# Patient Record
Sex: FEMALE | Race: WHITE | NOT HISPANIC OR LATINO | Employment: UNEMPLOYED | URBAN - METROPOLITAN AREA
[De-identification: names, ages, dates, MRNs, and addresses within clinical notes are randomized per-mention and may not be internally consistent; named-entity substitution may affect disease eponyms.]

---

## 2017-01-10 ENCOUNTER — OFFICE VISIT (OUTPATIENT)
Dept: PHYSICAL MEDICINE AND REHAB | Facility: CLINIC | Age: 57
End: 2017-01-10

## 2017-01-10 VITALS — HEIGHT: 65 IN | HEART RATE: 81 BPM | DIASTOLIC BLOOD PRESSURE: 64 MMHG | SYSTOLIC BLOOD PRESSURE: 105 MMHG

## 2017-01-10 DIAGNOSIS — G24.1 DYSTONIA, TORSION, FRAGMENTS OF: ICD-10-CM

## 2017-01-10 DIAGNOSIS — G24.3 SPASMODIC TORTICOLLIS: Primary | ICD-10-CM

## 2017-01-10 ASSESSMENT — PAIN SCALES - GENERAL: PAINLEVEL: NO PAIN (0)

## 2017-01-10 NOTE — PROGRESS NOTES
"BOTULINUM TOXIN PROCEDURE NOTE    Chief Complaint   Patient presents with     RECHECK     Cervical Dystonia        /64 mmHg  Pulse 81  Ht 1.651 m (5' 5\")      Current outpatient prescriptions:      epinastine HCl (ELESTAT) 0.05 % SOLN, 1 drop, Disp: , Rfl:      azelastine (OPTIVAR) 0.05 % SOLN, 1 drop, Disp: , Rfl:      OnabotulinumtoxinA (BOTOX IJ), Inject 200 Units into the muscle Lot # /C3  Exp: 5/2019, Disp: , Rfl:      OnabotulinumtoxinA (BOTOX IJ), Inject 200 Units as directed Lot # /C3 with Expiration Date:  2/2019 NDC:  [address]-01  (100 unit vial of Botox), Disp: , Rfl:      OnabotulinumtoxinA (BOTOX IJ), Inject 200 Units into the muscle Lot# /C3, Exp 09/2018, Disp: , Rfl:      OnabotulinumtoxinA (BOTOX IJ), Inject 175 Units into the muscle Lot# /C3, Exp 08/2018, Disp: , Rfl:      EPINEPHrine (EPIPEN JR) 0.15 MG/0.3ML injection, Inject 0.15 mg into the muscle as needed for anaphylaxis, Disp: , Rfl:      OnabotulinumtoxinA (BOTOX IJ), Inject 175 Units into the muscle Lot# /C3, Exp 10/2017, Disp: , Rfl:      OnabotulinumtoxinA (BOTOX IJ), Inject 175 Units as directed Lot # /C3  Exp: 8/2017, Disp: , Rfl:      OnabotulinumtoxinA (BOTOX IJ), Inject 175 Units into the muscle Lot# /C3, Exp 04/2017, Disp: , Rfl:      OnabotulinumtoxinA (BOTOX IJ), Inject 175 Units into the muscle Lot # /C3  Exp: 1/2017, Disp: , Rfl:      olopatadine (PATANOL) 0.1 % ophthalmic solution, 1 drop, Disp: , Rfl:      penciclovir (DENAVIR) 1 % cream, Apply 1 g topically every 2 hours, Disp: , Rfl:      acetaminophen (TYLENOL) 500 MG tablet, Take 500-1,000 mg by mouth every 6 hours as needed for mild pain (can take 4 per day), Disp: , Rfl:      estrogen, conjugated,-medroxyPROGESTERone (PREMPRO) 0.3-1.5 MG per tablet, Take 1 tablet by mouth daily, Disp: , Rfl:      Allergies   Allergen Reactions     Morphine Sulfate Hives     Latex      CONTACT RASH WITH LATEX PRODUCTS IN THE HEAT OF SUMMER "     Morphine Hives     Oxycodone Itching        PHYSICAL EXAM:  HEAD, NECK AND TRUNK PATTERN:   Tremor: Observed- no-no tremor   Forward Head: Present  Head & Neck Rotation: Right  Head & Neck Lateral Bend: Left  Shoulder Elevation: right (postural)    HPI:    Patient denies new medical diagnoses, illnesses, hospitalizations, emergency room visits, and injuries since the previous injection with botulinum neurotoxin.    We reviewed the recommended safety guidelines for  Botox from any vaccine injection, such as the seasonal flu vaccine, by a minimum of 10-14 days with Kendra Vaca. She acknowledged understanding.    RESPONSE TO PREVIOUS TREATMENT:    Kendra Vaca received 200 units of Botox on 2016.    Problems following the previous series of neurotoxin injections included:  Difficulty swallowing:  Rated as 'Mild' severity.  Duration: 1-2 weeks then full resolution.    BENEFITS BY PATIENT REPORT:    Pain Improvement: Yes. Percent Improvement: 90%, Duration of Benefit: 10 weeks and followed by a gradual reduction in benefit.    Dystonia Improvement: Yes. Percent Improvement: 90%, Duration of Benefit: 10 weeks and followed by a gradual reduction in benefit. Increase in dystonic movements and tremor over the last 3-4 weeks.     BOTULINUM NEUROTOXIN INJECTION PROCEDURES:    VERIFICATION OF PATIENT IDENTIFICATION AND PROCEDURE     Initials   Patient Name SCP   Patient  SCP   Procedure Verified by: [facility]     Prior to the start of the procedure and with procedural staff participation, I verbally confirmed the patient s identity using two indicators, relevant allergies, that the procedure was appropriate and matched the consent or emergent situation, and that the correct equipment/implants were available. Immediately prior to starting the procedure I conducted the Time Out with the procedural staff and re-confirmed the patient s name, procedure, and site/side. (The Joint Commission universal  protocol was followed.)  Yes    Sedation (Moderate or Deep): None    Above assessments performed by:  Resident/Fellow         Jeannine Reddy MD          The attending provider was present for the entire procedure documented below.    Eduard Llamas MD      INDICATION/S FOR PROCEDURE/S:  Kendra Vaca is a 56 year old patient with dystonia affecting the head, neck and shoulder girdle musculature secondary to a diagnosis of cervical and segmental dystonia with associated pain, tremor, loss of joint motion, loss of volitional motor control and difficulty with activities of daily living.     Her baseline symptoms have been recalcitrant to oral medications and conservative therapy. She is here today for re-evaluation and it was decided to proceed with re-injection of Botox.       TOTAL DOSE ADMINISTERED:  Dose Administered:  200 units Botox    Diluent Used:  Preservative Free Normal Saline  Total Volume of Diluent Used:  2.0 ml  Lot #  /C3 with Expiration Date:  08/2019  NDC #: Botox 100u ([address]-11)    Medication guide was offered to patient and was declined.    CONSENT:  The risks, benefits, and treatment options were discussed with Kendra Vaca and she agreed to proceed.      Written consent was obtained by [facility]     EQUIPMENT USED:  Needle-37mm stimulating/recording  EMG/NCS Machine    SKIN PREPARATION:  Skin preparation was performed using an alcohol wipe.    GUIDANCE DESCRIPTION:  Electro-myographic guidance was necessary throughout the procedure to accurately identify all areas of dystonic muscles while avoiding injection of non-dystonic muscles, neighboring nerves and nearby vascular structures.     AREA/MUSCLE INJECTED:  200 units  1. HEAD & NECK MUSCLES: Total dose = 200 units Botox, 1:1 dilution   Right Mid-Trapezius - 20 units of Botox at 1 site/s.   Left Mid-Trapezius - 15 units of Botox at 1 site/s.    Right lateral Trapezius - 25 units of Botox at 2 site.  Left lateral Trapezius - 10  units of Botox at 1 site.    Right Splenius Cervicis - 25 units of Botox at 1 site/s.   Left Splenius Cervicis - 20 units of Botox at 1 site/s.    Right Levator Scapulae - 25 units of Botox at 2 site/s (shoulder muscles).   Left Levator Scapulae - 15 units of Botox at 2 site/s (shoulder muscles).    Right Longissimus Capitis - 10 units of Botox at 1 site/s.     Right Inferior Obliquus Capitis - 10 units of Botox at 1 site/s.                Right Sternal head of the Sternocleidomastoid - 20 units of Botox at 1 site/s.       RESPONSE TO PROCEDURE:  Kendra Vaca tolerated the procedure well and there were no immediate complications.  She was allowed to recover for an appropriate period of time and was discharged home in stable condition.    FOLLOW UP:  Kendra Vaca was asked to follow up by phone in 7-14 days with Laura Lebron PT, Care Coordinator or Anita Howell RN, Care Coordinator, to report her response to this series of injections.  Based on the patient's previous response to this therapy, Kendra Vaca was rescheduled for the next series of injections in 10-11 weeks.    PLAN (Medication Changes, Therapy Orders, Work or Disability Issues, etc.): Will monitor response to today's injections and report.

## 2017-01-10 NOTE — Clinical Note
"1/10/2017       RE: Kendra Vaca  [address] [place] [address]     Dear Colleague,    Thank you for referring your patient, Kendra Vaca, to the [facility] PHYSICAL MEDICINE AND REHABILITATION at [facility]. Please see a copy of my visit note below.    BOTULINUM TOXIN PROCEDURE NOTE    Chief Complaint   Patient presents with     RECHECK     Cervical Dystonia        /64 mmHg  Pulse 81  Ht 1.651 m (5' 5\")      Current outpatient prescriptions:      epinastine HCl (ELESTAT) 0.05 % SOLN, 1 drop, Disp: , Rfl:      azelastine (OPTIVAR) 0.05 % SOLN, 1 drop, Disp: , Rfl:      OnabotulinumtoxinA (BOTOX IJ), Inject 200 Units into the muscle Lot # /C3  Exp: 5/2019, Disp: , Rfl:      OnabotulinumtoxinA (BOTOX IJ), Inject 200 Units as directed Lot # /C3 with Expiration Date:  2/2019 NDC:  [address]-06  (100 unit vial of Botox), Disp: , Rfl:      OnabotulinumtoxinA (BOTOX IJ), Inject 200 Units into the muscle Lot# /C3, Exp 09/2018, Disp: , Rfl:      OnabotulinumtoxinA (BOTOX IJ), Inject 175 Units into the muscle Lot# /C3, Exp 08/2018, Disp: , Rfl:      EPINEPHrine (EPIPEN JR) 0.15 MG/0.3ML injection, Inject 0.15 mg into the muscle as needed for anaphylaxis, Disp: , Rfl:      OnabotulinumtoxinA (BOTOX IJ), Inject 175 Units into the muscle Lot# /C3, Exp 10/2017, Disp: , Rfl:      OnabotulinumtoxinA (BOTOX IJ), Inject 175 Units as directed Lot # /C3  Exp: 8/2017, Disp: , Rfl:      OnabotulinumtoxinA (BOTOX IJ), Inject 175 Units into the muscle Lot# /C3, Exp 04/2017, Disp: , Rfl:      OnabotulinumtoxinA (BOTOX IJ), Inject 175 Units into the muscle Lot # /C3  Exp: 1/2017, Disp: , Rfl:      olopatadine (PATANOL) 0.1 % ophthalmic solution, 1 drop, Disp: , Rfl:      penciclovir (DENAVIR) 1 % cream, Apply 1 g topically every 2 hours, Disp: , Rfl:      acetaminophen (TYLENOL) 500 MG tablet, Take 500-1,000 mg by mouth every 6 hours " as needed for mild pain (can take 4 per day), Disp: , Rfl:      estrogen, conjugated,-medroxyPROGESTERone (PREMPRO) 0.3-1.5 MG per tablet, Take 1 tablet by mouth daily, Disp: , Rfl:      Allergies   Allergen Reactions     Morphine Sulfate Hives     Latex      CONTACT RASH WITH LATEX PRODUCTS IN THE HEAT OF SUMMER     Morphine Hives     Oxycodone Itching        PHYSICAL EXAM:  HEAD, NECK AND TRUNK PATTERN:   Tremor: Observed- no-no tremor   Forward Head: Present  Head & Neck Rotation: Right  Head & Neck Lateral Bend: Left  Shoulder Elevation: right (postural)    HPI:    Patient denies new medical diagnoses, illnesses, hospitalizations, emergency room visits, and injuries since the previous injection with botulinum neurotoxin.    We reviewed the recommended safety guidelines for  Botox from any vaccine injection, such as the seasonal flu vaccine, by a minimum of 10-14 days with Kendra Vaca. She acknowledged understanding.    RESPONSE TO PREVIOUS TREATMENT:    Kendra Vaca received 200 units of Botox on 2016.    Problems following the previous series of neurotoxin injections included:  Difficulty swallowing:  Rated as 'Mild' severity.  Duration: 1-2 weeks then full resolution.    BENEFITS BY PATIENT REPORT:    Pain Improvement: Yes. Percent Improvement: 90%, Duration of Benefit: 10 weeks and followed by a gradual reduction in benefit.    Dystonia Improvement: Yes. Percent Improvement: 90%, Duration of Benefit: 10 weeks and followed by a gradual reduction in benefit. Increase in dystonic movements and tremor over the last 3-4 weeks.     BOTULINUM NEUROTOXIN INJECTION PROCEDURES:    VERIFICATION OF PATIENT IDENTIFICATION AND PROCEDURE     Initials   Patient Name SCP   Patient  SCP   Procedure Verified by: [facility]     Prior to the start of the procedure and with procedural staff participation, I verbally confirmed the patient s identity using two indicators, relevant allergies, that the  procedure was appropriate and matched the consent or emergent situation, and that the correct equipment/implants were available. Immediately prior to starting the procedure I conducted the Time Out with the procedural staff and re-confirmed the patient s name, procedure, and site/side. (The Joint Commission universal protocol was followed.)  Yes    Sedation (Moderate or Deep): None    Above assessments performed by:  Resident/Fellow         Jeannine Reddy MD          The attending provider was present for the entire procedure documented below.    Eduard Llamas MD      INDICATION/S FOR PROCEDURE/S:  Kendra Vaca is a 56 year old patient with dystonia affecting the head, neck and shoulder girdle musculature secondary to a diagnosis of cervical and segmental dystonia with associated pain, tremor, loss of joint motion, loss of volitional motor control and difficulty with activities of daily living.     Her baseline symptoms have been recalcitrant to oral medications and conservative therapy. She is here today for re-evaluation and it was decided to proceed with re-injection of Botox.       TOTAL DOSE ADMINISTERED:  Dose Administered:  200 units Botox    Diluent Used:  Preservative Free Normal Saline  Total Volume of Diluent Used:  2.0 ml  Lot #  /C3 with Expiration Date:  08/2019  NDC #: Botox 100u ([address]-52)    Medication guide was offered to patient and was declined.    CONSENT:  The risks, benefits, and treatment options were discussed with Kendra Vaca and she agreed to proceed.      Written consent was obtained by [facility]     EQUIPMENT USED:  Needle-37mm stimulating/recording  EMG/NCS Machine    SKIN PREPARATION:  Skin preparation was performed using an alcohol wipe.    GUIDANCE DESCRIPTION:  Electro-myographic guidance was necessary throughout the procedure to accurately identify all areas of dystonic muscles while avoiding injection of non-dystonic muscles, neighboring nerves and nearby  vascular structures.     AREA/MUSCLE INJECTED:  200 units  1. HEAD & NECK MUSCLES: Total dose = 200 units Botox, 1:1 dilution   Right Mid-Trapezius - 20 units of Botox at 1 site/s.   Left Mid-Trapezius - 15 units of Botox at 1 site/s.    Right lateral Trapezius - 25 units of Botox at 2 site.  Left lateral Trapezius - 10 units of Botox at 1 site.    Right Splenius Cervicis - 25 units of Botox at 1 site/s.   Left Splenius Cervicis - 20 units of Botox at 1 site/s.    Right Levator Scapulae - 25 units of Botox at 2 site/s (shoulder muscles).   Left Levator Scapulae - 15 units of Botox at 2 site/s (shoulder muscles).    Right Longissimus Capitis - 10 units of Botox at 1 site/s.     Right Inferior Obliquus Capitis - 10 units of Botox at 1 site/s.                Right Sternal head of the Sternocleidomastoid - 20 units of Botox at 1 site/s.       RESPONSE TO PROCEDURE:  Kendra Vaca tolerated the procedure well and there were no immediate complications.  She was allowed to recover for an appropriate period of time and was discharged home in stable condition.    FOLLOW UP:  Kendra Vaca was asked to follow up by phone in 7-14 days with Laura Lebron PT, Care Coordinator or Anita Howell RN, Care Coordinator, to report her response to this series of injections.  Based on the patient's previous response to this therapy, Kendra Vaca was rescheduled for the next series of injections in 10-11 weeks.    PLAN (Medication Changes, Therapy Orders, Work or Disability Issues, etc.): Will monitor response to today's injections and report.      Again, thank you for allowing me to participate in the care of your patient.      Sincerely,    Eduard Llamas MD

## 2017-03-22 ENCOUNTER — OFFICE VISIT (OUTPATIENT)
Dept: PHYSICAL MEDICINE AND REHAB | Facility: CLINIC | Age: 57
End: 2017-03-22

## 2017-03-22 VITALS
HEART RATE: 88 BPM | BODY MASS INDEX: 26.61 KG/M2 | SYSTOLIC BLOOD PRESSURE: 135 MMHG | WEIGHT: 159.7 LBS | HEIGHT: 65 IN | DIASTOLIC BLOOD PRESSURE: 59 MMHG

## 2017-03-22 DIAGNOSIS — G24.3 SPASMODIC TORTICOLLIS: Primary | ICD-10-CM

## 2017-03-22 DIAGNOSIS — G24.1 DYSTONIA, TORSION, FRAGMENTS OF: ICD-10-CM

## 2017-03-22 ASSESSMENT — PAIN SCALES - GENERAL: PAINLEVEL: NO PAIN (0)

## 2017-03-22 NOTE — LETTER
"3/22/2017       RE: Kendra Vaca  [address] [place] [address]     Dear Colleague,    Thank you for referring your patient, Kendra Vaca, to the [facility] PHYSICAL MEDICINE AND REHABILITATION at [facility]. Please see a copy of my visit note below.    BOTULINUM TOXIN PROCEDURE NOTE    Chief Complaint   Patient presents with     RECHECK     [facility] RETURN BOTOX       /59 (BP Location: Left arm, Patient Position: Chair, Cuff Size: Adult Regular)  Pulse 88  Ht 1.651 m (5' 5\")  Wt 72.4 kg (159 lb 11.2 oz)  BMI 26.58 kg/m2      Current Outpatient Prescriptions:      OnabotulinumtoxinA (BOTOX IJ), Inject 200 Units into the muscle once Lot #:  Exp: 08/2019, Disp: , Rfl:      epinastine HCl (ELESTAT) 0.05 % SOLN, 1 drop, Disp: , Rfl:      azelastine (OPTIVAR) 0.05 % SOLN, 1 drop, Disp: , Rfl:      OnabotulinumtoxinA (BOTOX IJ), Inject 200 Units into the muscle Lot # /C3  Exp: 5/2019, Disp: , Rfl:      OnabotulinumtoxinA (BOTOX IJ), Inject 200 Units as directed Lot # /C3 with Expiration Date:  2/2019 NDC:  [address]-91  (100 unit vial of Botox), Disp: , Rfl:      OnabotulinumtoxinA (BOTOX IJ), Inject 200 Units into the muscle Lot# /C3, Exp 09/2018, Disp: , Rfl:      OnabotulinumtoxinA (BOTOX IJ), Inject 175 Units into the muscle Lot# /C3, Exp 08/2018, Disp: , Rfl:      EPINEPHrine (EPIPEN JR) 0.15 MG/0.3ML injection, Inject 0.15 mg into the muscle as needed for anaphylaxis, Disp: , Rfl:      OnabotulinumtoxinA (BOTOX IJ), Inject 175 Units into the muscle Lot# /C3, Exp 10/2017, Disp: , Rfl:      OnabotulinumtoxinA (BOTOX IJ), Inject 175 Units as directed Lot # /C3  Exp: 8/2017, Disp: , Rfl:      OnabotulinumtoxinA (BOTOX IJ), Inject 175 Units into the muscle Lot# /C3, Exp 04/2017, Disp: , Rfl:      OnabotulinumtoxinA (BOTOX IJ), Inject 175 Units into the muscle Lot # /C3  Exp: 1/2017, Disp: , Rfl:      olopatadine " (PATANOL) 0.1 % ophthalmic solution, 1 drop, Disp: , Rfl:      penciclovir (DENAVIR) 1 % cream, Apply 1 g topically every 2 hours, Disp: , Rfl:      acetaminophen (TYLENOL) 500 MG tablet, Take 500-1,000 mg by mouth every 6 hours as needed for mild pain (can take 4 per day), Disp: , Rfl:      estrogen, conjugated,-medroxyPROGESTERone (PREMPRO) 0.3-1.5 MG per tablet, Take 1 tablet by mouth daily, Disp: , Rfl:      Allergies   Allergen Reactions     Morphine Sulfate Hives     Latex      CONTACT RASH WITH LATEX PRODUCTS IN THE HEAT OF SUMMER     Morphine Hives     Oxycodone Itching        PHYSICAL EXAM:  HEAD, NECK AND TRUNK PATTERN:   Tremor: Observed- no-no tremor   Forward Head: Present  Head & Neck Rotation: Right  Head & Neck Lateral Bend: Left  Shoulder Elevation: right (postural)    HPI:    Patient denies new medical diagnoses, illnesses, hospitalizations, emergency room visits, and injuries since the previous injection with botulinum neurotoxin.    We reviewed the recommended safety guidelines for  Botox from any vaccine injection, such as the seasonal flu vaccine, by a minimum of 10-14 days with Kendra Vaca. She acknowledged understanding.    RESPONSE TO PREVIOUS TREATMENT:    Kendra Vaca received 200 units of Botox on 01/10/2017.    Problems following the previous series of neurotoxin injections included:  No problems reported    BENEFITS BY PATIENT REPORT:    Pain Improvement: Yes.  Percent Improvement: 90 %    Duration of Benefit:  10 weeks and followed by a gradual reduction in benefit.     Tremors and dystonia Improvement: Yes.  Percent Improvement: 90 %    Duration of Benefit:  10 weeks and followed by a gradual reduction in benefit.      BOTULINUM NEUROTOXIN INJECTION PROCEDURES:    VERIFICATION OF PATIENT IDENTIFICATION AND PROCEDURE     Initials   Patient Name RRF   Patient  RRF   Procedure Verified by: RRF     Prior to the start of the procedure and with procedural staff  participation, I verbally confirmed the patient s identity using two indicators, relevant allergies, that the procedure was appropriate and matched the consent or emergent situation, and that the correct equipment/implants were available. Immediately prior to starting the procedure I conducted the Time Out with the procedural staff and re-confirmed the patient s name, procedure, and site/side. (The Joint Commission universal protocol was followed.)  Yes    Sedation (Moderate or Deep): None      Above assessments performed by:  Resident/Fellow         Patricia Lindsay MD          The attending provider was present for the entire procedure documented below.    Eduard Llamas MD    INDICATION/S FOR PROCEDURE/S:  Kendra Vaca is a 56 year old patient with dystonia affecting the head, neck and shoulder girdle musculature secondary to a diagnosis of cervical and segmental dystonia with associated pain, tremor, loss of joint motion, loss of volitional motor control and difficulty with activities of daily living.      Her baseline symptoms have been recalcitrant to oral medications and conservative therapy. She is here today for re-evaluation and it was decided to proceed with re-injection of Botox.        TOTAL DOSE ADMINISTERED:  Dose Administered: 200 units Botox   Diluent Used: Preservative Free Normal Saline  Total Volume of Diluent Used: 2.0 ml  Lot # /C3 with Expiration Date: 09/2019  NDC #: Botox 100u ([address]-42)     CONSENT:  The risks, benefits, and treatment options were discussed with Kendra Vaca and she agreed to proceed.     Written consent was obtained by [facility]      EQUIPMENT USED:  Needle-37mm stimulating/recording  EMG/NCS Machine     SKIN PREPARATION:  Skin preparation was performed using an alcohol wipe.     GUIDANCE DESCRIPTION:  Electro-myographic guidance was necessary throughout the procedure to accurately identify all areas of dystonic muscles while avoiding injection of  non-dystonic muscles, neighboring nerves and nearby vascular structures.      AREA/MUSCLE INJECTED: 200 units  1. HEAD & NECK MUSCLES: Total dose = 200 units Botox, 1:1 dilution   Right Mid-Trapezius - 20 units of Botox at 1 site/s.   Left Mid-Trapezius - 15 units of Botox at 1 site/s.     Right lateral Trapezius - 25 units of Botox at 2 site.  Left lateral Trapezius - 10 units of Botox at 1 site.     Right Splenius Cervicis - 25 units of Botox at 1 site/s.   Left Splenius Cervicis - 20 units of Botox at 1 site/s.     Right Levator Scapulae - 25 units of Botox at 2 site/s (shoulder muscles).   Left Levator Scapulae - 15 units of Botox at 2 site/s (shoulder muscles).     Right Longissimus Capitis - 10 units of Botox at 1 site/s.      Right Inferior Obliquus Capitis - 10 units of Botox at 1 site/s.                 Right Sternal head of the Sternocleidomastoid - 20 units of Botox at 1 site/s.         RESPONSE TO PROCEDURE: Kendra Vaca tolerated the procedure well and there were no immediate complications. She was allowed to recover for an appropriate period of time and was discharged home in stable condition.     FOLLOW UP:  Kendra Vaca was asked to follow up by phone in 7-14 days with Laura Lebron PT, Care Coordinator or Anita Howell RN, Care Coordinator, to report her response to this series of injections. Based on the patient's previous response to this therapy, Kendra Vaca was rescheduled for the next series of injections in 10-11 weeks.     PLAN (Medication Changes, Therapy Orders, Work or Disability Issues, etc.): Will monitor response to today's injections and report.       Again, thank you for allowing me to participate in the care of your patient.      Sincerely,    Eduard Llamas MD

## 2017-03-22 NOTE — MR AVS SNAPSHOT
After Visit Summary   3/22/2017    Kendra Vaca    MRN: [id number]           Patient Information     Date Of Birth          1960        Visit Information        Provider Department      3/22/2017 10:40 AM Eduard Llamas MD [facility]         Follow-ups after your visit        Follow-up notes from your care team     Return in about 11 weeks (around 6/7/2017) for Cervical Dystonia.      Your next 10 appointments already scheduled     Mar 22, 2017 10:40 AM CDT   (Arrive by 10:25 AM)   Return Botox with Eduard Llamas MD   [facility] ([facility])    [address] [place] [id number] [phone]            Jun 07, 2017  1:40 PM CDT   (Arrive by 1:25 PM)   Return Botox with Eduard Llamas MD   [facility] ([facility])    [address] [place] [address] [phone]              Who to contact     Please call your clinic at [phone] to:    Ask questions about your health    Make or cancel appointments    Discuss your medicines    Learn about your test results    Speak to your doctor   If you have compliments or concerns about an experience at your clinic, or if you wish to file a complaint, please contact [facility] Patient Relations at [phone] or email us at [email or web address]         Additional Information About Your Visit        MyChart Information     [employer] is an electronic gateway that provides easy, online access to your medical records. With [employer], you can request a clinic appointment, read your test results, renew a prescription or communicate with your care team.     To sign up for [employer] visit the website at [email or web address]   You will be asked to enter the access code listed below, as well as  "some personal information. Please follow the directions to create your username and password.     Your access code is: [email or web address]  Expires: 3/27/2017  7:30 AM     Your access code will  in 90 days. If you need help or a new code, please contact your [facility] or call [phone] for assistance.        Care EveryWhere ID     This is your Care EveryWhere ID. This could be used by other organizations to access your [place] medical records  [id number]        Your Vitals Were     Pulse Height BMI (Body Mass Index)             88 1.651 m (5' 5\") 26.58 kg/m2          Blood Pressure from Last 3 Encounters:   17 135/59   01/10/17 105/64   16 134/62    Weight from Last 3 Encounters:   17 72.4 kg (159 lb 11.2 oz)   16 70.8 kg (156 lb)   16 68 kg (150 lb)              Today, you had the following     No orders found for display       Primary Care Provider Office Phone # Fax #    Dipak Leandro Dale [phone]       [facility] [address]  [facility] [address]        Thank you!     Thank you for choosing [facility]  for your care. Our goal is always to provide you with excellent care. Hearing back from our patients is one way we can continue to improve our services. Please take a few minutes to complete the written survey that you may receive in the mail after your visit with us. Thank you!             Your Updated Medication List - Protect others around you: Learn how to safely use, store and throw away your medicines at www.disposemymeds.org.          This list is accurate as of: 3/22/17 10:29 AM.  Always use your most recent med list.                   Brand Name Dispense Instructions for use    acetaminophen 500 MG tablet    TYLENOL     Take 500-1,000 mg by mouth every 6 hours as needed for mild pain (can take 4 per day)       azelastine 0.05 % Soln ophthalmic solution    OPTIVAR     1 " drop       * BOTOX IJ      Inject 175 Units into the muscle Lot # /C3  Exp: 1/2017       * BOTOX IJ      Inject 175 Units into the muscle Lot# /C3, Exp 04/2017       * BOTOX IJ      Inject 175 Units as directed Lot # /C3  Exp: 8/2017       * BOTOX IJ      Inject 175 Units into the muscle Lot# /C3, Exp 10/2017       * BOTOX IJ      Inject 175 Units into the muscle Lot# /C3, Exp 08/2018       * BOTOX IJ      Inject 200 Units into the muscle Lot# /C3, Exp 09/2018       * BOTOX IJ      Inject 200 Units as directed Lot # /C3 with Expiration Date:? 2/2019 NDC:  [address]-13  (100 unit vial of Botox)       * BOTOX IJ      Inject 200 Units into the muscle Lot # /C3  Exp: 5/2019       * BOTOX IJ      Inject 200 Units into the muscle once Lot #:  Exp: 08/2019       DENAVIR 1 % cream   Generic drug:  penciclovir      Apply 1 g topically every 2 hours       ELESTAT 0.05 % Soln   Generic drug:  epinastine HCl      1 drop       EPINEPHrine 0.15 MG/0.3ML injection    EPIPEN JR     Inject 0.15 mg into the muscle as needed for anaphylaxis       estrogen (conjugated)-medroxyPROGESTERone 0.3-1.5 MG per tablet    PREMPRO     Take 1 tablet by mouth daily       PATANOL 0.1 % ophthalmic solution   Generic drug:  olopatadine      1 drop       * Notice:  This list has 9 medication(s) that are the same as other medications prescribed for you. Read the directions carefully, and ask your doctor or other care provider to review them with you.

## 2017-03-22 NOTE — PROGRESS NOTES
"BOTULINUM TOXIN PROCEDURE NOTE    Chief Complaint   Patient presents with     RECHECK     [facility] RETURN BOTOX       /59 (BP Location: Left arm, Patient Position: Chair, Cuff Size: Adult Regular)  Pulse 88  Ht 1.651 m (5' 5\")  Wt 72.4 kg (159 lb 11.2 oz)  BMI 26.58 kg/m2      Current Outpatient Prescriptions:      OnabotulinumtoxinA (BOTOX IJ), Inject 200 Units into the muscle once Lot #:  Exp: 08/2019, Disp: , Rfl:      epinastine HCl (ELESTAT) 0.05 % SOLN, 1 drop, Disp: , Rfl:      azelastine (OPTIVAR) 0.05 % SOLN, 1 drop, Disp: , Rfl:      OnabotulinumtoxinA (BOTOX IJ), Inject 200 Units into the muscle Lot # /C3  Exp: 5/2019, Disp: , Rfl:      OnabotulinumtoxinA (BOTOX IJ), Inject 200 Units as directed Lot # /C3 with Expiration Date:  2/2019 NDC:  [address]-66  (100 unit vial of Botox), Disp: , Rfl:      OnabotulinumtoxinA (BOTOX IJ), Inject 200 Units into the muscle Lot# /C3, Exp 09/2018, Disp: , Rfl:      OnabotulinumtoxinA (BOTOX IJ), Inject 175 Units into the muscle Lot# /C3, Exp 08/2018, Disp: , Rfl:      EPINEPHrine (EPIPEN JR) 0.15 MG/0.3ML injection, Inject 0.15 mg into the muscle as needed for anaphylaxis, Disp: , Rfl:      OnabotulinumtoxinA (BOTOX IJ), Inject 175 Units into the muscle Lot# /C3, Exp 10/2017, Disp: , Rfl:      OnabotulinumtoxinA (BOTOX IJ), Inject 175 Units as directed Lot # /C3  Exp: 8/2017, Disp: , Rfl:      OnabotulinumtoxinA (BOTOX IJ), Inject 175 Units into the muscle Lot# /C3, Exp 04/2017, Disp: , Rfl:      OnabotulinumtoxinA (BOTOX IJ), Inject 175 Units into the muscle Lot # /C3  Exp: 1/2017, Disp: , Rfl:      olopatadine (PATANOL) 0.1 % ophthalmic solution, 1 drop, Disp: , Rfl:      penciclovir (DENAVIR) 1 % cream, Apply 1 g topically every 2 hours, Disp: , Rfl:      acetaminophen (TYLENOL) 500 MG tablet, Take 500-1,000 mg by mouth every 6 hours as needed for mild pain (can take 4 per day), Disp: , Rfl:      estrogen, " conjugated,-medroxyPROGESTERone (PREMPRO) 0.3-1.5 MG per tablet, Take 1 tablet by mouth daily, Disp: , Rfl:      Allergies   Allergen Reactions     Morphine Sulfate Hives     Latex      CONTACT RASH WITH LATEX PRODUCTS IN THE HEAT OF SUMMER     Morphine Hives     Oxycodone Itching        PHYSICAL EXAM:  HEAD, NECK AND TRUNK PATTERN:   Tremor: Observed- no-no tremor   Forward Head: Present  Head & Neck Rotation: Right  Head & Neck Lateral Bend: Left  Shoulder Elevation: right (postural)    HPI:    Patient denies new medical diagnoses, illnesses, hospitalizations, emergency room visits, and injuries since the previous injection with botulinum neurotoxin.    We reviewed the recommended safety guidelines for  Botox from any vaccine injection, such as the seasonal flu vaccine, by a minimum of 10-14 days with Kendra Vaca. She acknowledged understanding.    RESPONSE TO PREVIOUS TREATMENT:    Kendra Vaca received 200 units of Botox on 01/10/2017.    Problems following the previous series of neurotoxin injections included:  No problems reported    BENEFITS BY PATIENT REPORT:    Pain Improvement: Yes.  Percent Improvement: 90 %    Duration of Benefit:  10 weeks and followed by a gradual reduction in benefit.     Tremors and dystonia Improvement: Yes.  Percent Improvement: 90 %    Duration of Benefit:  10 weeks and followed by a gradual reduction in benefit.      BOTULINUM NEUROTOXIN INJECTION PROCEDURES:    VERIFICATION OF PATIENT IDENTIFICATION AND PROCEDURE     Initials   Patient Name RRF   Patient  RRF   Procedure Verified by: ALICIA     Prior to the start of the procedure and with procedural staff participation, I verbally confirmed the patient s identity using two indicators, relevant allergies, that the procedure was appropriate and matched the consent or emergent situation, and that the correct equipment/implants were available. Immediately prior to starting the procedure I conducted the Time  Out with the procedural staff and re-confirmed the patient s name, procedure, and site/side. (The Joint Commission universal protocol was followed.)  Yes    Sedation (Moderate or Deep): None      Above assessments performed by:  Resident/Fellow         Patricia Lindsay MD          The attending provider was present for the entire procedure documented below.    Eduard Llamas MD    INDICATION/S FOR PROCEDURE/S:  Kendra Vaca is a 56 year old patient with dystonia affecting the head, neck and shoulder girdle musculature secondary to a diagnosis of cervical and segmental dystonia with associated pain, tremor, loss of joint motion, loss of volitional motor control and difficulty with activities of daily living.      Her baseline symptoms have been recalcitrant to oral medications and conservative therapy. She is here today for re-evaluation and it was decided to proceed with re-injection of Botox.        TOTAL DOSE ADMINISTERED:  Dose Administered: 200 units Botox   Diluent Used: Preservative Free Normal Saline  Total Volume of Diluent Used: 2.0 ml  Lot # /C3 with Expiration Date: 09/2019  NDC #: Botox 100u ([address]-54)     CONSENT:  The risks, benefits, and treatment options were discussed with Kendra Vaca and she agreed to proceed.     Written consent was obtained by F      EQUIPMENT USED:  Needle-37mm stimulating/recording  EMG/NCS Machine     SKIN PREPARATION:  Skin preparation was performed using an alcohol wipe.     GUIDANCE DESCRIPTION:  Electro-myographic guidance was necessary throughout the procedure to accurately identify all areas of dystonic muscles while avoiding injection of non-dystonic muscles, neighboring nerves and nearby vascular structures.      AREA/MUSCLE INJECTED: 200 units  1. HEAD & NECK MUSCLES: Total dose = 200 units Botox, 1:1 dilution   Right Mid-Trapezius - 20 units of Botox at 1 site/s.   Left Mid-Trapezius - 15 units of Botox at 1 site/s.     Right lateral Trapezius  - 25 units of Botox at 2 site.  Left lateral Trapezius - 10 units of Botox at 1 site.     Right Splenius Cervicis - 25 units of Botox at 1 site/s.   Left Splenius Cervicis - 20 units of Botox at 1 site/s.     Right Levator Scapulae - 25 units of Botox at 2 site/s (shoulder muscles).   Left Levator Scapulae - 15 units of Botox at 2 site/s (shoulder muscles).     Right Longissimus Capitis - 10 units of Botox at 1 site/s.      Right Inferior Obliquus Capitis - 10 units of Botox at 1 site/s.                 Right Sternal head of the Sternocleidomastoid - 20 units of Botox at 1 site/s.         RESPONSE TO PROCEDURE: Kendra Vaca tolerated the procedure well and there were no immediate complications. She was allowed to recover for an appropriate period of time and was discharged home in stable condition.     FOLLOW UP:  Kendra Vaca was asked to follow up by phone in 7-14 days with Laura Lebron PT, Care Coordinator or Anita Howell RN, Care Coordinator, to report her response to this series of injections. Based on the patient's previous response to this therapy, Kendra Vaca was rescheduled for the next series of injections in 10-11 weeks.     PLAN (Medication Changes, Therapy Orders, Work or Disability Issues, etc.): Will monitor response to today's injections and report.

## 2017-06-07 ENCOUNTER — OFFICE VISIT (OUTPATIENT)
Dept: PHYSICAL MEDICINE AND REHAB | Facility: CLINIC | Age: 57
End: 2017-06-07

## 2017-06-07 VITALS
HEART RATE: 92 BPM | SYSTOLIC BLOOD PRESSURE: 163 MMHG | WEIGHT: 155.1 LBS | DIASTOLIC BLOOD PRESSURE: 86 MMHG | HEIGHT: 65 IN | BODY MASS INDEX: 25.84 KG/M2 | TEMPERATURE: 97.8 F

## 2017-06-07 DIAGNOSIS — G24.3 SPASMODIC TORTICOLLIS: Primary | ICD-10-CM

## 2017-06-07 DIAGNOSIS — G24.1 DYSTONIA, TORSION, FRAGMENTS OF: ICD-10-CM

## 2017-06-07 ASSESSMENT — PAIN SCALES - GENERAL: PAINLEVEL: NO PAIN (0)

## 2017-06-07 NOTE — LETTER
"6/7/2017       RE: Kendra Vaca  [address] [place] [address]     Dear Colleague,    Thank you for referring your patient, Kendra Vaca, to the [facility] at [facility]. Please see a copy of my visit note below.    BOTULINUM TOXIN PROCEDURE NOTE  Chief Complaint   Patient presents with     RECHECK     [facility] RETURN - BOTOX       Ht 1.651 m (5' 5\")  Wt 70.4 kg (155 lb 1.6 oz)  BMI 25.81 kg/m2      Current Outpatient Prescriptions:      OnabotulinumtoxinA (BOTOX IJ), Inject 200 Units as directed once Lot # /C3 with Expiration Date: 09/2019, Disp: , Rfl:      OnabotulinumtoxinA (BOTOX IJ), Inject 200 Units into the muscle once Lot #:  Exp: 08/2019, Disp: , Rfl:      epinastine HCl (ELESTAT) 0.05 % SOLN, 1 drop, Disp: , Rfl:      azelastine (OPTIVAR) 0.05 % SOLN, 1 drop, Disp: , Rfl:      OnabotulinumtoxinA (BOTOX IJ), Inject 200 Units into the muscle Lot # /C3  Exp: 5/2019, Disp: , Rfl:      OnabotulinumtoxinA (BOTOX IJ), Inject 200 Units as directed Lot # /C3 with Expiration Date:  2/2019 NDC:  [address]-65  (100 unit vial of Botox), Disp: , Rfl:      OnabotulinumtoxinA (BOTOX IJ), Inject 200 Units into the muscle Lot# /C3, Exp 09/2018, Disp: , Rfl:      OnabotulinumtoxinA (BOTOX IJ), Inject 175 Units into the muscle Lot# /C3, Exp 08/2018, Disp: , Rfl:      EPINEPHrine (EPIPEN JR) 0.15 MG/0.3ML injection, Inject 0.15 mg into the muscle as needed for anaphylaxis, Disp: , Rfl:      OnabotulinumtoxinA (BOTOX IJ), Inject 175 Units into the muscle Lot# /C3, Exp 10/2017, Disp: , Rfl:      OnabotulinumtoxinA (BOTOX IJ), Inject 175 Units as directed Lot # /C3  Exp: 8/2017, Disp: , Rfl:      OnabotulinumtoxinA (BOTOX IJ), Inject 175 Units into the muscle Lot# /C3, Exp 04/2017, Disp: , Rfl:      OnabotulinumtoxinA (BOTOX IJ), Inject 175 Units into the muscle Lot # /C3  Exp: 1/2017, " Disp: , Rfl:      olopatadine (PATANOL) 0.1 % ophthalmic solution, 1 drop, Disp: , Rfl:      penciclovir (DENAVIR) 1 % cream, Apply 1 g topically every 2 hours, Disp: , Rfl:      acetaminophen (TYLENOL) 500 MG tablet, Take 500-1,000 mg by mouth every 6 hours as needed for mild pain (can take 4 per day), Disp: , Rfl:      Allergies   Allergen Reactions     Morphine Sulfate Hives     Latex      CONTACT RASH WITH LATEX PRODUCTS IN THE HEAT OF SUMMER     Morphine Hives     Oxycodone Itching      PHYSICAL EXAM:  HEAD, NECK AND TRUNK PATTERN:   Tremor: Observed- no-no tremor   Forward Head: Present  Head flexion present  Head & Neck Rotation: Right  Head & Neck Lateral Bend: Left  Shoulder Elevation: right (postural)    HPI:  Patient denies new medical diagnoses, illnesses, hospitalizations, emergency room visits, and injuries since the previous injection with botulinum neurotoxin.    We reviewed the recommended safety guidelines for  Botox from any vaccine injection, such as the seasonal flu vaccine, by a minimum of 10-14 days with Kendra Vaca. She acknowledged understanding.    RESPONSE TO PREVIOUS TREATMENT:  Kendra Vaca received 200 units of Botox on 2017.    Problems following the previous series of neurotoxin injections included:  No problems reported    BENEFITS BY PATIENT REPORT:  Pain Improvement: Yes.  Percent Improvement: 80-90 %    Duration of Benefit:  10 weeks and followed by a gradual reduction in benefit.     Tremors and dystonia Improvement: Yes.  Percent Improvement: 80-90 %    Duration of Benefit:  10 weeks and followed by a gradual reduction in benefit.      BOTULINUM NEUROTOXIN INJECTION PROCEDURES:    VERIFICATION OF PATIENT IDENTIFICATION AND PROCEDURE     Initials   Patient Name RRN   Patient  RRN   Procedure Verified by: TRICIA     Prior to the start of the procedure and with procedural staff participation, I verbally confirmed the patient s identity using two  indicators, relevant allergies, that the procedure was appropriate and matched the consent or emergent situation, and that the correct equipment/implants were available. Immediately prior to starting the procedure I conducted the Time Out with the procedural staff and re-confirmed the patient s name, procedure, and site/side. (The Joint Commission universal protocol was followed.)  Yes    Sedation (Moderate or Deep): None      Above assessments performed by:  Resident/Fellow         William Wilkins, DO          The attending provider was present for the entire procedure documented below.    Eduard Llamas MD    INDICATION/S FOR PROCEDURE/S:  Kendra Vaca is a 56 year old patient with dystonia affecting the head, neck and shoulder girdle musculature secondary to a diagnosis of cervical and segmental dystonia with associated pain, tremor, loss of joint motion, loss of volitional motor control and difficulty with activities of daily living.      Her baseline symptoms have been recalcitrant to oral medications and conservative therapy. She is here today for re-evaluation and it was decided to proceed with re-injection of Botox.        TOTAL DOSE ADMINISTERED:  Dose Administered: 200 units Botox   Diluent Used: Preservative Free Normal Saline  Total Volume of Diluent Used: 2.0 ml  Lot # /C3 with Expiration Date: 01/2020  NDC #: Botox 100u ([address]-90)     CONSENT:  The risks, benefits, and treatment options were discussed with Kendra Vaca and she agreed to proceed.     Written consent was obtained by RRN     EQUIPMENT USED:  Needle-37mm stimulating/recording  EMG/NCS Machine     SKIN PREPARATION:  Skin preparation was performed using an alcohol wipe.     GUIDANCE DESCRIPTION:  Electro-myographic guidance was necessary throughout the procedure to accurately identify all areas of dystonic muscles while avoiding injection of non-dystonic muscles, neighboring nerves and nearby vascular structures.       AREA/MUSCLE INJECTED: 200 units  1. HEAD & NECK MUSCLES: Total dose = 200 units Botox, 1:1 dilution   Right Mid-Trapezius - 20 units of Botox at 1 site/s.   Left Mid-Trapezius - 15 units of Botox at 1 site/s.     Right lateral Trapezius - 25 units of Botox at 2 site.  Left lateral Trapezius - 10 units of Botox at 1 site.     Right Splenius Cervicis - 25 units of Botox at 1 site/s.   Left Splenius Cervicis - 20 units of Botox at 1 site/s.     Right Levator Scapulae - 25 units of Botox at 2 site/s (shoulder muscles).   Left Levator Scapulae - 15 units of Botox at 2 site/s (shoulder muscles).     Right Longissimus Capitis - 10 units of Botox at 1 site/s.      Right Inferior Obliquus Capitis - 10 units of Botox at 1 site/s.                 Right Sternal head of the Sternocleidomastoid - 20 units of Botox at 1 site/s.         RESPONSE TO PROCEDURE: Kendra Vaca tolerated the procedure well and there were no immediate complications. She was allowed to recover for an appropriate period of time and was discharged home in stable condition.     FOLLOW UP:  Kendra Vaca was asked to follow up by phone in 7-14 days with Laura Lebron PT, Care Coordinator or Anita Howell RN, Care Coordinator, to report her response to this series of injections. Based on the patient's previous response to this therapy, Kendra Vaca was rescheduled for the next series of injections in 10-11 weeks.     PLAN (Medication Changes, Therapy Orders, Work or Disability Issues, etc.): Will monitor response to today's injections and report.       Again, thank you for allowing me to participate in the care of your patient.      Sincerely,    Eduard Llamas MD

## 2017-06-07 NOTE — NURSING NOTE
Chief Complaint   Patient presents with     RECHECK     [facility] RETURN - BOTOX     Danika Trujillo MA

## 2017-06-07 NOTE — PROGRESS NOTES
"BOTULINUM TOXIN PROCEDURE NOTE    Chief Complaint   Patient presents with     RECHECK     [facility] RETURN - BOTOX       Ht 1.651 m (5' 5\")  Wt 70.4 kg (155 lb 1.6 oz)  BMI 25.81 kg/m2      Current Outpatient Prescriptions:      OnabotulinumtoxinA (BOTOX IJ), Inject 200 Units as directed once Lot # /C3 with Expiration Date: 09/2019, Disp: , Rfl:      OnabotulinumtoxinA (BOTOX IJ), Inject 200 Units into the muscle once Lot #:  Exp: 08/2019, Disp: , Rfl:      epinastine HCl (ELESTAT) 0.05 % SOLN, 1 drop, Disp: , Rfl:      azelastine (OPTIVAR) 0.05 % SOLN, 1 drop, Disp: , Rfl:      OnabotulinumtoxinA (BOTOX IJ), Inject 200 Units into the muscle Lot # /C3  Exp: 5/2019, Disp: , Rfl:      OnabotulinumtoxinA (BOTOX IJ), Inject 200 Units as directed Lot # /C3 with Expiration Date:  2/2019 NDC:  [address]-27  (100 unit vial of Botox), Disp: , Rfl:      OnabotulinumtoxinA (BOTOX IJ), Inject 200 Units into the muscle Lot# /C3, Exp 09/2018, Disp: , Rfl:      OnabotulinumtoxinA (BOTOX IJ), Inject 175 Units into the muscle Lot# /C3, Exp 08/2018, Disp: , Rfl:      EPINEPHrine (EPIPEN JR) 0.15 MG/0.3ML injection, Inject 0.15 mg into the muscle as needed for anaphylaxis, Disp: , Rfl:      OnabotulinumtoxinA (BOTOX IJ), Inject 175 Units into the muscle Lot# /C3, Exp 10/2017, Disp: , Rfl:      OnabotulinumtoxinA (BOTOX IJ), Inject 175 Units as directed Lot # /C3  Exp: 8/2017, Disp: , Rfl:      OnabotulinumtoxinA (BOTOX IJ), Inject 175 Units into the muscle Lot# /C3, Exp 04/2017, Disp: , Rfl:      OnabotulinumtoxinA (BOTOX IJ), Inject 175 Units into the muscle Lot # /C3  Exp: 1/2017, Disp: , Rfl:      olopatadine (PATANOL) 0.1 % ophthalmic solution, 1 drop, Disp: , Rfl:      penciclovir (DENAVIR) 1 % cream, Apply 1 g topically every 2 hours, Disp: , Rfl:      acetaminophen (TYLENOL) 500 MG tablet, Take 500-1,000 mg by mouth every 6 hours as needed for mild pain (can take 4 per day), Disp: " , Rfl:      Allergies   Allergen Reactions     Morphine Sulfate Hives     Latex      CONTACT RASH WITH LATEX PRODUCTS IN THE HEAT OF SUMMER     Morphine Hives     Oxycodone Itching        PHYSICAL EXAM:  HEAD, NECK AND TRUNK PATTERN:   Tremor: Observed- no-no tremor   Forward Head: Present  Head flexion present  Head & Neck Rotation: Right  Head & Neck Lateral Bend: Left  Shoulder Elevation: right (postural)    HPI:    Patient denies new medical diagnoses, illnesses, hospitalizations, emergency room visits, and injuries since the previous injection with botulinum neurotoxin.    We reviewed the recommended safety guidelines for  Botox from any vaccine injection, such as the seasonal flu vaccine, by a minimum of 10-14 days with Kendra Vaca. She acknowledged understanding.    RESPONSE TO PREVIOUS TREATMENT:    Kendra Vaca received 200 units of Botox on 2017.    Problems following the previous series of neurotoxin injections included:  No problems reported    BENEFITS BY PATIENT REPORT:    Pain Improvement: Yes.  Percent Improvement: 80-90 %    Duration of Benefit:  10 weeks and followed by a gradual reduction in benefit.     Tremors and dystonia Improvement: Yes.  Percent Improvement: 80-90 %    Duration of Benefit:  10 weeks and followed by a gradual reduction in benefit.      BOTULINUM NEUROTOXIN INJECTION PROCEDURES:    VERIFICATION OF PATIENT IDENTIFICATION AND PROCEDURE     Initials   Patient Name RRN   Patient  RRN   Procedure Verified by: TRICIA     Prior to the start of the procedure and with procedural staff participation, I verbally confirmed the patient s identity using two indicators, relevant allergies, that the procedure was appropriate and matched the consent or emergent situation, and that the correct equipment/implants were available. Immediately prior to starting the procedure I conducted the Time Out with the procedural staff and re-confirmed the patient s name,  procedure, and site/side. (The Joint Commission universal protocol was followed.)  Yes    Sedation (Moderate or Deep): None      Above assessments performed by:  Resident/Fellow         William Wilkins DO          The attending provider was present for the entire procedure documented below.    Eduard Llamas MD    INDICATION/S FOR PROCEDURE/S:  Kendra Vaca is a 56 year old patient with dystonia affecting the head, neck and shoulder girdle musculature secondary to a diagnosis of cervical and segmental dystonia with associated pain, tremor, loss of joint motion, loss of volitional motor control and difficulty with activities of daily living.      Her baseline symptoms have been recalcitrant to oral medications and conservative therapy. She is here today for re-evaluation and it was decided to proceed with re-injection of Botox.        TOTAL DOSE ADMINISTERED:  Dose Administered: 200 units Botox   Diluent Used: Preservative Free Normal Saline  Total Volume of Diluent Used: 2.0 ml  Lot # /C3 with Expiration Date: 01/2020  NDC #: Botox 100u ([address]-46)     CONSENT:  The risks, benefits, and treatment options were discussed with Kendra Vaca and she agreed to proceed.     Written consent was obtained by RRN     EQUIPMENT USED:  Needle-37mm stimulating/recording  EMG/NCS Machine     SKIN PREPARATION:  Skin preparation was performed using an alcohol wipe.     GUIDANCE DESCRIPTION:  Electro-myographic guidance was necessary throughout the procedure to accurately identify all areas of dystonic muscles while avoiding injection of non-dystonic muscles, neighboring nerves and nearby vascular structures.      AREA/MUSCLE INJECTED: 200 units  1. HEAD & NECK MUSCLES: Total dose = 200 units Botox, 1:1 dilution   Right Mid-Trapezius - 20 units of Botox at 1 site/s.   Left Mid-Trapezius - 15 units of Botox at 1 site/s.     Right lateral Trapezius - 25 units of Botox at 2 site.  Left lateral Trapezius - 10  units of Botox at 1 site.     Right Splenius Cervicis - 25 units of Botox at 1 site/s.   Left Splenius Cervicis - 20 units of Botox at 1 site/s.     Right Levator Scapulae - 25 units of Botox at 2 site/s (shoulder muscles).   Left Levator Scapulae - 15 units of Botox at 2 site/s (shoulder muscles).     Right Longissimus Capitis - 10 units of Botox at 1 site/s.      Right Inferior Obliquus Capitis - 10 units of Botox at 1 site/s.                 Right Sternal head of the Sternocleidomastoid - 20 units of Botox at 1 site/s.         RESPONSE TO PROCEDURE: Kendra Vaca tolerated the procedure well and there were no immediate complications. She was allowed to recover for an appropriate period of time and was discharged home in stable condition.     FOLLOW UP:  Kendra Vaca was asked to follow up by phone in 7-14 days with Laura Lebron PT, Care Coordinator or Anita Howell RN, Care Coordinator, to report her response to this series of injections. Based on the patient's previous response to this therapy, Kendra Vaca was rescheduled for the next series of injections in 10-11 weeks.     PLAN (Medication Changes, Therapy Orders, Work or Disability Issues, etc.): Will monitor response to today's injections and report.

## 2017-06-07 NOTE — MR AVS SNAPSHOT
After Visit Summary   2017    Kendra Vaca    MRN: [id number]           Patient Information     Date Of Birth          1960        Visit Information        Provider Department      2017 1:40 PM Eduard Llamas MD [facility] Medicine [facility] Rehabilitation         Follow-ups after your visit        Follow-up notes from your care team     Return in about 10 weeks (around 2017) for Dystonia.      Your next 10 appointments already scheduled     Aug 24, 2017  3:50 PM CDT   (Arrive by 3:35 PM)   Return Botox with Eduard Llamas MD   [facility] Medicine [facility] Rehabilitation ([facility])    [address] [place] [id number] [phone]              Who to contact     Please call your clinic at [phone] to:    Ask questions about your health    Make or cancel appointments    Discuss your medicines    Learn about your test results    Speak to your doctor   If you have compliments or concerns about an experience at your clinic, or if you wish to file a complaint, please contact [facility] Patient Relations at [phone] or email us at [email or web address]         Additional Information About Your Visit        MyChart Information     [facility] is an electronic gateway that provides easy, online access to your medical records. With [employer], you can request a clinic appointment, read your test results, renew a prescription or communicate with your care team.     To sign up for [facility] visit the website at [email or web address]   You will be asked to enter the access code listed below, as well as some personal information. Please follow the directions to create your username and password.     Your access code is: [email or web address]  Expires: 2017  6:30 AM     Your access code will  in 90 days. If you need help or a new code, please contact your [facility] or call [phone] for assistance.        Care EveryWhere ID     This is your Care EveryWhere ID. This could be used by other organizations to access your [facility] medical records  [id number]        Your Vitals Were     Pulse Temperature Height BMI (Body Mass Index)          92 97.8  F (36.6  C) (Oral) 1.651 m (5' 5\") 25.81 kg/m2         Blood Pressure from Last 3 Encounters:   06/07/17 163/86   03/22/17 135/59   01/10/17 105/64    Weight from Last 3 Encounters:   06/07/17 70.4 kg (155 lb 1.6 oz)   03/22/17 72.4 kg (159 lb 11.2 oz)   09/14/16 70.8 kg (156 lb)              Today, you had the following     No orders found for display       Primary Care Provider Office Phone # Fax #    [place] [phone]       [facility] [address]  [facility] [address]        Thank you!     Thank you for choosing [facility] PHYSICAL MEDICINE AND REHABILITATION  for your care. Our goal is always to provide you with excellent care. Hearing back from our patients is one way we can continue to improve our services. Please take a few minutes to complete the written survey that you may receive in the mail after your visit with us. Thank you!             Your Updated Medication List - Protect others around you: Learn how to safely use, store and throw away your medicines at www.disposemymeds.org.          This list is accurate as of: 6/7/17  1:44 PM.  Always use your most recent med list.                   Brand Name Dispense Instructions for use    acetaminophen 500 MG tablet    TYLENOL     Take 500-1,000 mg by mouth every 6 hours as needed for mild pain (can take 4 per day)       azelastine 0.05 % Soln ophthalmic solution    OPTIVAR     1 drop       * BOTOX IJ      Inject 200 Units as directed once Lot # /C3 with Expiration Date: 09/2019       * BOTOX IJ      Inject 175 Units into the muscle Lot # /C3  Exp: 1/2017       * BOTOX IJ      Inject 175 Units into the " muscle Lot# /C3, Exp 04/2017       * BOTOX IJ      Inject 175 Units as directed Lot # /C3  Exp: 8/2017       * BOTOX IJ      Inject 175 Units into the muscle Lot# /C3, Exp 10/2017       * BOTOX IJ      Inject 175 Units into the muscle Lot# /C3, Exp 08/2018       * BOTOX IJ      Inject 200 Units into the muscle Lot# /C3, Exp 09/2018       * BOTOX IJ      Inject 200 Units as directed Lot # /C3 with Expiration Date:? 2/2019 NDC:  [address]-94  (100 unit vial of Botox)       * BOTOX IJ      Inject 200 Units into the muscle Lot # /C3  Exp: 5/2019       * BOTOX IJ      Inject 200 Units into the muscle once Lot #:  Exp: 08/2019       * BOTOX IJ      Inject 200 Units as directed once Lot#: [id number] Exp: 01/2020       DENAVIR 1 % cream   Generic drug:  penciclovir      Apply 1 g topically every 2 hours       ELESTAT 0.05 % Soln   Generic drug:  epinastine HCl      1 drop       EPINEPHrine 0.15 MG/0.3ML injection    EPIPEN JR     Inject 0.15 mg into the muscle as needed for anaphylaxis       PATANOL 0.1 % ophthalmic solution   Generic drug:  olopatadine      1 drop       * Notice:  This list has 11 medication(s) that are the same as other medications prescribed for you. Read the directions carefully, and ask your doctor or other care provider to review them with you.

## 2017-08-24 ENCOUNTER — OFFICE VISIT (OUTPATIENT)
Dept: PHYSICAL MEDICINE AND REHAB | Facility: CLINIC | Age: 57
End: 2017-08-24

## 2017-08-24 VITALS — HEART RATE: 82 BPM | SYSTOLIC BLOOD PRESSURE: 110 MMHG | HEIGHT: 65 IN | DIASTOLIC BLOOD PRESSURE: 83 MMHG

## 2017-08-24 DIAGNOSIS — G24.1 DYSTONIA, TORSION, FRAGMENTS OF: ICD-10-CM

## 2017-08-24 DIAGNOSIS — G24.3 SPASMODIC TORTICOLLIS: Primary | ICD-10-CM

## 2017-08-24 ASSESSMENT — PAIN SCALES - GENERAL: PAINLEVEL: NO PAIN (0)

## 2017-08-24 NOTE — LETTER
"8/24/2017       RE: Kendra Vaca  [address] [place] [address]     Dear Colleague,    Thank you for referring your patient, Kendra Vaca, to the [facility] PHYSICAL MEDICINE AND REHABILITATION at [facility]. Please see a copy of my visit note below.    BOTULINUM TOXIN PROCEDURE NOTE  Chief Complaint   Patient presents with     Dystonia     Botox Cervical Dystonia       /83  Pulse 82  Ht 1.651 m (5' 5\")    Current Outpatient Prescriptions:      OnabotulinumtoxinA (BOTOX IJ), Inject 200 Units as directed once Lot#: [id number] Exp: 01/2020, Disp: , Rfl:      OnabotulinumtoxinA (BOTOX IJ), Inject 200 Units as directed once Lot # /C3 with Expiration Date: 09/2019, Disp: , Rfl:      OnabotulinumtoxinA (BOTOX IJ), Inject 200 Units into the muscle once Lot #:  Exp: 08/2019, Disp: , Rfl:      epinastine HCl (ELESTAT) 0.05 % SOLN, 1 drop, Disp: , Rfl:      azelastine (OPTIVAR) 0.05 % SOLN, 1 drop, Disp: , Rfl:      OnabotulinumtoxinA (BOTOX IJ), Inject 200 Units into the muscle Lot # /C3  Exp: 5/2019, Disp: , Rfl:      OnabotulinumtoxinA (BOTOX IJ), Inject 200 Units as directed Lot # /C3 with Expiration Date:  2/2019 NDC:  [address]-14  (100 unit vial of Botox), Disp: , Rfl:      OnabotulinumtoxinA (BOTOX IJ), Inject 200 Units into the muscle Lot# /C3, Exp 09/2018, Disp: , Rfl:      OnabotulinumtoxinA (BOTOX IJ), Inject 175 Units into the muscle Lot# /C3, Exp 08/2018, Disp: , Rfl:      EPINEPHrine (EPIPEN JR) 0.15 MG/0.3ML injection, Inject 0.15 mg into the muscle as needed for anaphylaxis, Disp: , Rfl:      OnabotulinumtoxinA (BOTOX IJ), Inject 175 Units into the muscle Lot# /C3, Exp 10/2017, Disp: , Rfl:      OnabotulinumtoxinA (BOTOX IJ), Inject 175 Units as directed Lot # /C3  Exp: 8/2017, Disp: , Rfl:      OnabotulinumtoxinA (BOTOX IJ), Inject 175 Units into the muscle Lot# /C3, Exp 04/2017, Disp: , Rfl:      " OnabotulinumtoxinA (BOTOX IJ), Inject 175 Units into the muscle Lot # /C3  Exp: 2017, Disp: , Rfl:      olopatadine (PATANOL) 0.1 % ophthalmic solution, 1 drop, Disp: , Rfl:      penciclovir (DENAVIR) 1 % cream, Apply 1 g topically every 2 hours, Disp: , Rfl:      acetaminophen (TYLENOL) 500 MG tablet, Take 500-1,000 mg by mouth every 6 hours as needed for mild pain (can take 4 per day), Disp: , Rfl:      Allergies   Allergen Reactions     Morphine Sulfate Hives     Latex      CONTACT RASH WITH LATEX PRODUCTS IN THE HEAT OF SUMMER     Morphine Hives     Oxycodone Itching      PHYSICAL EXAM:  HEAD, NECK AND TRUNK PATTERN:   Tremor: Observed- no-no tremor   Forward Head: Present  Head flexion present  Head & Neck Rotation: Right  Head & Neck Lateral Bend: Left  Shoulder Elevation: right (postural)    HPI:  Patient denies new medical diagnoses, illnesses, hospitalizations, emergency room visits, and injuries since the previous injection with botulinum neurotoxin.    We reviewed the recommended safety guidelines for  Botox from any vaccine injection, such as the seasonal flu vaccine, by a minimum of 10-14 days with Kendra Vaca. She acknowledged understanding.    RESPONSE TO PREVIOUS TREATMENT:    Kendra Vaca received 200 units of Botox on 2017.    Problems following the previous series of neurotoxin injections included:  No problems reported    BENEFITS BY PATIENT REPORT:  Pain Improvement: Yes.  Percent Improvement: 80-90 %    Duration of Benefit:  10 weeks and followed by a gradual reduction in benefit.      Tremors and dystonia Improvement: Yes.  Percent Improvement: 80-90 %    Duration of Benefit:  10 weeks and followed by a gradual reduction in benefit.    BOTULINUM NEUROTOXIN INJECTION PROCEDURES:    VERIFICATION OF PATIENT IDENTIFICATION AND PROCEDURE   Initials   Patient Name lmd   Patient  lmd   Procedure Verified by: lmd     Prior to the start of the procedure and with  procedural staff participation, I verbally confirmed the patient s identity using two indicators, relevant allergies, that the procedure was appropriate and matched the consent or emergent situation, and that the correct equipment/implants were available. Immediately prior to starting the procedure I conducted the Time Out with the procedural staff and re-confirmed the patient s name, procedure, and site/side. (The Joint Commission universal protocol was followed.)  Yes    Sedation (Moderate or Deep): None    Above assessments performed by:  Anita Davidson RN Care Coordinator    Eduard Llamas MD      INDICATION/S FOR PROCEDURE/S:  Kendra Vaca is a 57 year old patient with dystonia affecting the head, neck and shoulder girdle musculature secondary to a diagnosis of cervical and segmental dystonia with associated pain, tremor, loss of joint motion, loss of volitional motor control and difficulty with activities of daily living.       Her baseline symptoms have been recalcitrant to oral medications and conservative therapy. She is here today for re-evaluation and it was decided to proceed with re-injection of Botox.       TOTAL DOSE ADMINISTERED:  Dose Administered:  200 units Botox    Diluent Used:  Preservative Free Normal Saline  Total Volume of Diluent Used:  2.0 ml  Lot # /C3 with Expiration Date:  4/2020  NDC #: Botox 100u ([address]-85)    Medication guide was offered to patient and was declined.    CONSENT:  The risks, benefits, and treatment options were discussed with Kendra Vaca and she agreed to proceed.      Written consent was obtained by lmd.     EQUIPMENT USED:  Needle-37mm stimulating/recording  EMG/NCS Machine      SKIN PREPARATION:  Skin preparation was performed using an alcohol wipe.      GUIDANCE DESCRIPTION:  Electro-myographic guidance was necessary throughout the procedure to accurately identify all areas of dystonic muscles while avoiding injection of non-dystonic  muscles, neighboring nerves and nearby vascular structures.     AREA/MUSCLE INJECTED:  200 Units Botox  1. HEAD & NECK MUSCLES: Total dose = 200 units Botox, 1:1 dilution   Right Mid-Trapezius - 20 units of Botox at 1 site/s.   Left Mid-Trapezius - 15 units of Botox at 1 site/s.      Right lateral Trapezius - 25 units of Botox at 2 site.  Left lateral Trapezius - 10 units of Botox at 1 site.      Right Splenius Cervicis - 25 units of Botox at 1 site/s.   Left Splenius Cervicis - 20 units of Botox at 1 site/s.      Right Levator Scapulae - 25 units of Botox at 2 site/s (shoulder muscles).   Left Levator Scapulae - 15 units of Botox at 2 site/s (shoulder muscles).      Right Longissimus Capitis - 10 units of Botox at 1 site/s.       Right Inferior Obliquus Capitis - 10 units of Botox at 1 site/s.                  Right Sternal head of the Sternocleidomastoid - 20 units of Botox at 1 site/s.     RESPONSE TO PROCEDURE:  Kendra Vaca tolerated the procedure well and there were no immediate complications.  She was allowed to recover for an appropriate period of time and was discharged home in stable condition.    FOLLOW UP:  Kendra Vaca was asked to follow up by phone in 7-14 days with Laura Lebron PT, Care Coordinator or Anita Howell RN, Care Coordinator, to report her response to this series of injections.  Based on the patient's previous response to this therapy, Kendra Vaca was rescheduled for the next series of injections in 10-11 weeks.    PLAN (Medication Changes, Therapy Orders, Work or Disability Issues, etc.): Will monitor response to today's injections and report.    Again, thank you for allowing me to participate in the care of your patient.      Sincerely,    Eduard Llamas MD

## 2017-08-24 NOTE — PROGRESS NOTES
"BOTULINUM TOXIN PROCEDURE NOTE    Chief Complaint   Patient presents with     Dystonia     Botox Cervical Dystonia       /83  Pulse 82  Ht 1.651 m (5' 5\")      Current Outpatient Prescriptions:      OnabotulinumtoxinA (BOTOX IJ), Inject 200 Units as directed once Lot#: [id number] Exp: 01/2020, Disp: , Rfl:      OnabotulinumtoxinA (BOTOX IJ), Inject 200 Units as directed once Lot # /C3 with Expiration Date: 09/2019, Disp: , Rfl:      OnabotulinumtoxinA (BOTOX IJ), Inject 200 Units into the muscle once Lot #:  Exp: 08/2019, Disp: , Rfl:      epinastine HCl (ELESTAT) 0.05 % SOLN, 1 drop, Disp: , Rfl:      azelastine (OPTIVAR) 0.05 % SOLN, 1 drop, Disp: , Rfl:      OnabotulinumtoxinA (BOTOX IJ), Inject 200 Units into the muscle Lot # /C3  Exp: 5/2019, Disp: , Rfl:      OnabotulinumtoxinA (BOTOX IJ), Inject 200 Units as directed Lot # /C3 with Expiration Date:  2/2019 NDC:  [address]-77  (100 unit vial of Botox), Disp: , Rfl:      OnabotulinumtoxinA (BOTOX IJ), Inject 200 Units into the muscle Lot# /C3, Exp 09/2018, Disp: , Rfl:      OnabotulinumtoxinA (BOTOX IJ), Inject 175 Units into the muscle Lot# /C3, Exp 08/2018, Disp: , Rfl:      EPINEPHrine (EPIPEN JR) 0.15 MG/0.3ML injection, Inject 0.15 mg into the muscle as needed for anaphylaxis, Disp: , Rfl:      OnabotulinumtoxinA (BOTOX IJ), Inject 175 Units into the muscle Lot# /C3, Exp 10/2017, Disp: , Rfl:      OnabotulinumtoxinA (BOTOX IJ), Inject 175 Units as directed Lot # /C3  Exp: 8/2017, Disp: , Rfl:      OnabotulinumtoxinA (BOTOX IJ), Inject 175 Units into the muscle Lot# /C3, Exp 04/2017, Disp: , Rfl:      OnabotulinumtoxinA (BOTOX IJ), Inject 175 Units into the muscle Lot # /C3  Exp: 1/2017, Disp: , Rfl:      olopatadine (PATANOL) 0.1 % ophthalmic solution, 1 drop, Disp: , Rfl:      penciclovir (DENAVIR) 1 % cream, Apply 1 g topically every 2 hours, Disp: , Rfl:      acetaminophen (TYLENOL) 500 MG tablet, " Take 500-1,000 mg by mouth every 6 hours as needed for mild pain (can take 4 per day), Disp: , Rfl:      Allergies   Allergen Reactions     Morphine Sulfate Hives     Latex      CONTACT RASH WITH LATEX PRODUCTS IN THE HEAT OF SUMMER     Morphine Hives     Oxycodone Itching        PHYSICAL EXAM:  HEAD, NECK AND TRUNK PATTERN:   Tremor: Observed- no-no tremor   Forward Head: Present  Head flexion present  Head & Neck Rotation: Right  Head & Neck Lateral Bend: Left  Shoulder Elevation: right (postural)    HPI:    Patient denies new medical diagnoses, illnesses, hospitalizations, emergency room visits, and injuries since the previous injection with botulinum neurotoxin.    We reviewed the recommended safety guidelines for  Botox from any vaccine injection, such as the seasonal flu vaccine, by a minimum of 10-14 days with Kendra Vaca. She acknowledged understanding.    RESPONSE TO PREVIOUS TREATMENT:    Kendra Vaca received 200 units of Botox on 2017.    Problems following the previous series of neurotoxin injections included:  No problems reported    BENEFITS BY PATIENT REPORT:  Pain Improvement: Yes.  Percent Improvement: 80-90 %    Duration of Benefit:  10 weeks and followed by a gradual reduction in benefit.      Tremors and dystonia Improvement: Yes.  Percent Improvement: 80-90 %    Duration of Benefit:  10 weeks and followed by a gradual reduction in benefit.    BOTULINUM NEUROTOXIN INJECTION PROCEDURES:    VERIFICATION OF PATIENT IDENTIFICATION AND PROCEDURE     Initials   Patient Name lmd   Patient  lmd   Procedure Verified by: shanika     Prior to the start of the procedure and with procedural staff participation, I verbally confirmed the patient s identity using two indicators, relevant allergies, that the procedure was appropriate and matched the consent or emergent situation, and that the correct equipment/implants were available. Immediately prior to starting the procedure I  conducted the Time Out with the procedural staff and re-confirmed the patient s name, procedure, and site/side. (The Joint Commission universal protocol was followed.)  Yes    Sedation (Moderate or Deep): None      Above assessments performed by:  Anita Davidson RN Care Coordinator    Eduard Llamas MD      INDICATION/S FOR PROCEDURE/S:  Kendra Vaca is a 57 year old patient with dystonia affecting the head, neck and shoulder girdle musculature secondary to a diagnosis of cervical and segmental dystonia with associated pain, tremor, loss of joint motion, loss of volitional motor control and difficulty with activities of daily living.       Her baseline symptoms have been recalcitrant to oral medications and conservative therapy. She is here today for re-evaluation and it was decided to proceed with re-injection of Botox.       TOTAL DOSE ADMINISTERED:  Dose Administered:  200 units Botox    Diluent Used:  Preservative Free Normal Saline  Total Volume of Diluent Used:  2.0 ml  Lot # /C3 with Expiration Date:  4/2020  NDC #: Botox 100u ([address]-13)    Medication guide was offered to patient and was declined.    CONSENT:  The risks, benefits, and treatment options were discussed with Kendra Vaca and she agreed to proceed.      Written consent was obtained by lmd.     EQUIPMENT USED:  Needle-37mm stimulating/recording  EMG/NCS Machine      SKIN PREPARATION:  Skin preparation was performed using an alcohol wipe.      GUIDANCE DESCRIPTION:  Electro-myographic guidance was necessary throughout the procedure to accurately identify all areas of dystonic muscles while avoiding injection of non-dystonic muscles, neighboring nerves and nearby vascular structures.     AREA/MUSCLE INJECTED:  200 Units Botox  1. HEAD & NECK MUSCLES: Total dose = 200 units Botox, 1:1 dilution   Right Mid-Trapezius - 20 units of Botox at 1 site/s.   Left Mid-Trapezius - 15 units of Botox at 1 site/s.      Right lateral  Trapezius - 25 units of Botox at 2 site.  Left lateral Trapezius - 10 units of Botox at 1 site.      Right Splenius Cervicis - 25 units of Botox at 1 site/s.   Left Splenius Cervicis - 20 units of Botox at 1 site/s.      Right Levator Scapulae - 25 units of Botox at 2 site/s (shoulder muscles).   Left Levator Scapulae - 15 units of Botox at 2 site/s (shoulder muscles).      Right Longissimus Capitis - 10 units of Botox at 1 site/s.       Right Inferior Obliquus Capitis - 10 units of Botox at 1 site/s.                  Right Sternal head of the Sternocleidomastoid - 20 units of Botox at 1 site/s.     RESPONSE TO PROCEDURE:  Kendra Vaca tolerated the procedure well and there were no immediate complications.  She was allowed to recover for an appropriate period of time and was discharged home in stable condition.    FOLLOW UP:  Kendra Vaca was asked to follow up by phone in 7-14 days with Laura Lebron PT, Care Coordinator or Anita Howell RN, Care Coordinator, to report her response to this series of injections.  Based on the patient's previous response to this therapy, Kendra Vaca was rescheduled for the next series of injections in 10-11 weeks.    PLAN (Medication Changes, Therapy Orders, Work or Disability Issues, etc.): Will monitor response to today's injections and report.

## 2017-08-24 NOTE — NURSING NOTE
Chief Complaint   Patient presents with     RECHECK     Cervical Dystonia    Jared Navarro CMA

## 2017-08-24 NOTE — MR AVS SNAPSHOT
After Visit Summary   8/24/2017    Kendra Vaca    MRN: [id number]           Patient Information     Date Of Birth          1960        Visit Information        Provider Department      8/24/2017 3:50 PM Eduard Llamas MD [facility]        Today's Diagnoses     Spasmodic torticollis    -  1    Dystonia, torsion, fragments of           Follow-ups after your visit        Follow-up notes from your care team     Return in about 10 weeks (around 11/2/2017) for Dystonia.      Your next 10 appointments already scheduled     Oct 31, 2017  2:20 PM CDT   (Arrive by 2:05 PM)   Return Botox with Eduard Llamas MD   [facility] ([facility])    [address] [place] [id number] [phone]              Who to contact     Please call your clinic at [phone] to:    Ask questions about your health    Make or cancel appointments    Discuss your medicines    Learn about your test results    Speak to your doctor   If you have compliments or concerns about an experience at your clinic, or if you wish to file a complaint, please contact [facility] Patient Relations at [phone] or email us at [email or web address]         Additional Information About Your Visit        MyChart Information     [employer] is an electronic gateway that provides easy, online access to your medical records. With [employer], you can request a clinic appointment, read your test results, renew a prescription or communicate with your care team.     To sign up for [employer] visit the website at [email or web address]   You will be asked to enter the access code listed below, as well as some personal information. Please follow the directions to create your username and password.     Your access code is: [email or web address]  Expires: 11/22/2017  4:03 PM     Your access code  "will  in 90 days. If you need help or a new code, please contact your [facility] or call [phone] for assistance.        Care EveryWhere ID     This is your Care EveryWhere ID. This could be used by other organizations to access your [place] medical records  [id number]        Your Vitals Were     Pulse Height                82 1.651 m (5' 5\")           Blood Pressure from Last 3 Encounters:   17 110/83   17 163/86   17 135/59    Weight from Last 3 Encounters:   17 70.4 kg (155 lb 1.6 oz)   17 72.4 kg (159 lb 11.2 oz)   16 70.8 kg (156 lb)              We Performed the Following     HC CHEMODENERVATION 1 EXTREMITY, EA ADDL EXTREMITY, 1-4 MUSCLE     HC CHEMODENERVATION MUSCLE NECK UNILAT     HC CHEMODENERVATION ONE EXTREMITY, 1-4 MUSCLE     Needle EMG Guide w/Chemodenervation (31822)        Primary Care Provider Office Phone # Fax #    Dipak Leandro Dale [phone]       [facility] [address]  [facility] [address]        Equal Access to Services     VIVIANA BANEGAS : Hadii aad ku hadasho Soomaali, waaxda luqadaha, qaybta kaalmada adeegyada, waxay jose luis haylisan kaela lynn. So [facility] [phone].    ATENCIÓN: Si habla español, tiene a rodriguez disposición servicios gratuitos de asistencia lingüística. Llame al [phone].    We comply with applicable federal civil rights laws and [place] laws. We do not discriminate on the basis of race, color, national origin, age, disability sex, sexual orientation or gender identity.            Thank you!     Thank you for choosing [facility]  for your care. Our goal is always to provide you with excellent care. Hearing back from our patients is one way we can continue to improve our services. Please take a few minutes to complete the written survey that you may receive in the mail after your visit with us. Thank you!           "   Your Updated Medication List - Protect others around you: Learn how to safely use, store and throw away your medicines at www.disposemymeds.org.          This list is accurate as of: 8/24/17  4:03 PM.  Always use your most recent med list.                   Brand Name Dispense Instructions for use Diagnosis    acetaminophen 500 MG tablet    TYLENOL     Take 500-1,000 mg by mouth every 6 hours as needed for mild pain (can take 4 per day)        azelastine 0.05 % Soln ophthalmic solution    OPTIVAR     1 drop        * BOTOX IJ      Inject 200 Units as directed once Lot # /C3 with Expiration Date: 09/2019        * BOTOX IJ      Inject 175 Units into the muscle Lot # /C3  Exp: 1/2017        * BOTOX IJ      Inject 175 Units into the muscle Lot# /C3, Exp 04/2017        * BOTOX IJ      Inject 175 Units as directed Lot # /C3  Exp: 8/2017        * BOTOX IJ      Inject 175 Units into the muscle Lot# /C3, Exp 10/2017        * BOTOX IJ      Inject 175 Units into the muscle Lot# /C3, Exp 08/2018        * BOTOX IJ      Inject 200 Units into the muscle Lot# /C3, Exp 09/2018        * BOTOX IJ      Inject 200 Units as directed Lot # /C3 with Expiration Date:? 2/2019 NDC:  [address]-96  (100 unit vial of Botox)        * BOTOX IJ      Inject 200 Units into the muscle Lot # /C3  Exp: 5/2019        * BOTOX IJ      Inject 200 Units into the muscle once Lot #:  Exp: 08/2019        * BOTOX IJ      Inject 200 Units as directed once Lot#: [id number] Exp: 01/2020        * BOTOX IJ      Inject 200 Units into the muscle Lot # /C3  Exp: 4/2020        DENAVIR 1 % cream   Generic drug:  penciclovir      Apply 1 g topically every 2 hours        ELESTAT 0.05 % Soln   Generic drug:  epinastine HCl      1 drop        EPINEPHrine 0.15 MG/0.3ML injection 2-pack    EPIPEN JR     Inject 0.15 mg into the muscle as needed for anaphylaxis        PATANOL 0.1 % ophthalmic solution   Generic drug:   olopatadine      1 drop        * Notice:  This list has 12 medication(s) that are the same as other medications prescribed for you. Read the directions carefully, and ask your doctor or other care provider to review them with you.

## 2017-10-31 ENCOUNTER — OFFICE VISIT (OUTPATIENT)
Dept: PHYSICAL MEDICINE AND REHAB | Facility: CLINIC | Age: 57
End: 2017-10-31

## 2017-10-31 VITALS
HEIGHT: 65 IN | TEMPERATURE: 97.6 F | SYSTOLIC BLOOD PRESSURE: 108 MMHG | WEIGHT: 155 LBS | DIASTOLIC BLOOD PRESSURE: 73 MMHG | BODY MASS INDEX: 25.83 KG/M2 | HEART RATE: 97 BPM

## 2017-10-31 DIAGNOSIS — G24.3 SPASMODIC TORTICOLLIS: Primary | ICD-10-CM

## 2017-10-31 DIAGNOSIS — G24.1 DYSTONIA, TORSION, FRAGMENTS OF: ICD-10-CM

## 2017-10-31 ASSESSMENT — PAIN SCALES - GENERAL: PAINLEVEL: NO PAIN (0)

## 2017-10-31 NOTE — LETTER
"10/31/2017       RE: Kendra Vaca  [address] [place] [address]     Dear Colleague,    Thank you for referring your patient, Kendra Vaca, to the [facility]ATION at [facility]. Please see a copy of my visit note below.    BOTULINUM TOXIN PROCEDURE NOTE    Chief Complaint   Patient presents with     RECHECK     [facility] RETURN - BOTOX       /73 (BP Location: Right arm, Patient Position: Sitting, Cuff Size: Adult Regular)  Pulse 97  Temp 97.6  F (36.4  C) (Oral)  Ht 1.651 m (5' 5\")  Wt 70.3 kg (155 lb)  BMI 25.79 kg/m2      Current Outpatient Prescriptions:      OnabotulinumtoxinA (BOTOX IJ), Inject 200 Units into the muscle Lot # /C3  Exp: 4/2020, Disp: , Rfl:      OnabotulinumtoxinA (BOTOX IJ), Inject 200 Units as directed once Lot#: [id number] Exp: 01/2020, Disp: , Rfl:      OnabotulinumtoxinA (BOTOX IJ), Inject 200 Units as directed once Lot # /C3 with Expiration Date: 09/2019, Disp: , Rfl:      OnabotulinumtoxinA (BOTOX IJ), Inject 200 Units into the muscle once Lot #:  Exp: 08/2019, Disp: , Rfl:      epinastine HCl (ELESTAT) 0.05 % SOLN, 1 drop, Disp: , Rfl:      azelastine (OPTIVAR) 0.05 % SOLN, 1 drop, Disp: , Rfl:      OnabotulinumtoxinA (BOTOX IJ), Inject 200 Units into the muscle Lot # /C3  Exp: 5/2019, Disp: , Rfl:      OnabotulinumtoxinA (BOTOX IJ), Inject 200 Units as directed Lot # /C3 with Expiration Date:  2/2019 NDC:  [address]-99  (100 unit vial of Botox), Disp: , Rfl:      OnabotulinumtoxinA (BOTOX IJ), Inject 200 Units into the muscle Lot# /C3, Exp 09/2018, Disp: , Rfl:      OnabotulinumtoxinA (BOTOX IJ), Inject 175 Units into the muscle Lot# /C3, Exp 08/2018, Disp: , Rfl:      EPINEPHrine (EPIPEN JR) 0.15 MG/0.3ML injection, Inject 0.15 mg into the muscle as needed for anaphylaxis, Disp: , Rfl:      OnabotulinumtoxinA (BOTOX IJ), Inject 175 Units into the muscle Lot# " /C3, Exp 10/2017, Disp: , Rfl:      OnabotulinumtoxinA (BOTOX IJ), Inject 175 Units as directed Lot # /C3  Exp: 8/2017, Disp: , Rfl:      OnabotulinumtoxinA (BOTOX IJ), Inject 175 Units into the muscle Lot# /C3, Exp 04/2017, Disp: , Rfl:      OnabotulinumtoxinA (BOTOX IJ), Inject 175 Units into the muscle Lot # /C3  Exp: 1/2017, Disp: , Rfl:      olopatadine (PATANOL) 0.1 % ophthalmic solution, 1 drop, Disp: , Rfl:      penciclovir (DENAVIR) 1 % cream, Apply 1 g topically every 2 hours, Disp: , Rfl:      acetaminophen (TYLENOL) 500 MG tablet, Take 500-1,000 mg by mouth every 6 hours as needed for mild pain (can take 4 per day), Disp: , Rfl:      Allergies   Allergen Reactions     Morphine Sulfate Hives     Latex      CONTACT RASH WITH LATEX PRODUCTS IN THE HEAT OF SUMMER     Morphine Hives        PHYSICAL EXAM:  HEAD, NECK AND TRUNK PATTERN:   Tremor: Observed- no-no tremor   Forward Head: Present  Head flexion present  Head & Neck Rotation: Right  Head & Neck Lateral Bend: Left  Shoulder Elevation: right (postural)    HPI:    Patient denies new medical diagnoses, illnesses, hospitalizations, emergency room visits, and injuries since the previous injection with botulinum neurotoxin.    We reviewed the recommended safety guidelines for  Botox from any vaccine injection, such as the seasonal flu vaccine, by a minimum of 10-14 days with Kendra Vaca. She acknowledged understanding.    RESPONSE TO PREVIOUS TREATMENT:    Kendra Vaca received 200 units of Botox on 08/24/2017.    Problems following the previous series of neurotoxin injections included:  No problems reported    BENEFITS BY PATIENT REPORT:  Pain Improvement: Yes.  Percent Improvement: 80-90 %  Duration of Benefit:  10 weeks and followed by a gradual reduction in benefit.      Tremors and dystonia Improvement: Yes.  Percent Improvement: 80-90 %  Duration of Benefit:  10 weeks and followed by a gradual reduction in  benefit.    BOTULINUM NEUROTOXIN INJECTION PROCEDURES:    VERIFICATION OF PATIENT IDENTIFICATION AND PROCEDURE     Initials   Patient Name ses   Patient  ses   Procedure Verified by: danilo     Prior to the start of the procedure and with procedural staff participation, I verbally confirmed the patient s identity using two indicators, relevant allergies, that the procedure was appropriate and matched the consent or emergent situation, and that the correct equipment/implants were available. Immediately prior to starting the procedure I conducted the Time Out with the procedural staff and re-confirmed the patient s name, procedure, and site/side. (The Joint Commission universal protocol was followed.)  Yes    Sedation (Moderate or Deep): None      Above assessments performed by:  [place] Llamas MD      INDICATION/S FOR PROCEDURE/S:  Kendra Vaca is a 57-year-old patient with dystonia affecting the head, neck and shoulder girdle musculature secondary to a diagnosis of cervical and segmental dystonia with associated pain, tremor, loss of joint motion, loss of volitional motor control and difficulty with activities of daily living.       Her baseline symptoms have been recalcitrant to oral medications and conservative therapy. She is here today for re-evaluation and it was decided to proceed with re-injection of Botox.       TOTAL DOSE ADMINISTERED:  Dose Administered:  200 units Botox    Diluent Used:  Preservative Free Normal Saline  Total Volume of Diluent Used:  2.0 ml  Lot # /C3 with Expiration Date:  2020  NDC #: Botox 100u ([address]-79)    Medication guide was offered to patient and was declined.    CONSENT:  The risks, benefits, and treatment options were discussed with Kendra Vaca and she agreed to proceed.      Written consent was obtained by [facility].     EQUIPMENT USED:  Needle-37mm stimulating/recording  EMG/NCS Machine      SKIN PREPARATION:  Skin preparation  was performed using an alcohol wipe.      GUIDANCE DESCRIPTION:  Electro-myographic guidance was necessary throughout the procedure to accurately identify all areas of dystonic muscles while avoiding injection of non-dystonic muscles, neighboring nerves and nearby vascular structures.     AREA/MUSCLE INJECTED:  200 Units Botox  1. HEAD & NECK MUSCLES: Total dose = 200 units Botox, 1:1 dilution   Right Mid-Trapezius - 20 units of Botox at 1 site/s.   Left Mid-Trapezius - 15 units of Botox at 1 site/s.      Right lateral Trapezius - 25 units of Botox at 2 site.  Left lateral Trapezius - 10 units of Botox at 1 site.      Right Splenius Cervicis - 25 units of Botox at 1 site/s.   Left Splenius Cervicis - 20 units of Botox at 1 site/s.      Right Levator Scapulae - 25 units of Botox at 2 site/s (shoulder muscles).   Left Levator Scapulae - 15 units of Botox at 2 site/s (shoulder muscles).      Right Longissimus Capitis - 10 units of Botox at 1 site/s.       Right Inferior Obliquus Capitis - 10 units of Botox at 1 site/s.                  Right Sternal head of the Sternocleidomastoid - 20 units of Botox at 1 site/s.       RESPONSE TO PROCEDURE:  Kendra Vaca tolerated the procedure well and there were no immediate complications.  She was allowed to recover for an appropriate period of time and was discharged home in stable condition.    FOLLOW UP:  Kendra Vaca was asked to follow up by phone in 7-14 days with Laura Lebron PT, Care Coordinator or Anita Howell RN, Care Coordinator, to report her response to this series of injections.  Based on the patient's previous response to this therapy, Kendra Vaca was rescheduled for the next series of injections in 10-11 weeks.    PLAN (Medication Changes, Therapy Orders, Work or Disability Issues, etc.): Will monitor response to today's injections and report.    Again, thank you for allowing me to participate in the care of your patient.       Sincerely,    Eduard Llamas MD

## 2017-10-31 NOTE — PROGRESS NOTES
"BOTULINUM TOXIN PROCEDURE NOTE    Chief Complaint   Patient presents with     RECHECK     [facility] RETURN - BOTOX       /73 (BP Location: Right arm, Patient Position: Sitting, Cuff Size: Adult Regular)  Pulse 97  Temp 97.6  F (36.4  C) (Oral)  Ht 1.651 m (5' 5\")  Wt 70.3 kg (155 lb)  BMI 25.79 kg/m2      Current Outpatient Prescriptions:      OnabotulinumtoxinA (BOTOX IJ), Inject 200 Units into the muscle Lot # /C3  Exp: 4/2020, Disp: , Rfl:      OnabotulinumtoxinA (BOTOX IJ), Inject 200 Units as directed once Lot#: [id number] Exp: 01/2020, Disp: , Rfl:      OnabotulinumtoxinA (BOTOX IJ), Inject 200 Units as directed once Lot # /C3 with Expiration Date: 09/2019, Disp: , Rfl:      OnabotulinumtoxinA (BOTOX IJ), Inject 200 Units into the muscle once Lot #:  Exp: 08/2019, Disp: , Rfl:      epinastine HCl (ELESTAT) 0.05 % SOLN, 1 drop, Disp: , Rfl:      azelastine (OPTIVAR) 0.05 % SOLN, 1 drop, Disp: , Rfl:      OnabotulinumtoxinA (BOTOX IJ), Inject 200 Units into the muscle Lot # /C3  Exp: 5/2019, Disp: , Rfl:      OnabotulinumtoxinA (BOTOX IJ), Inject 200 Units as directed Lot # /C3 with Expiration Date:  2/2019 NDC:  [address]-57  (100 unit vial of Botox), Disp: , Rfl:      OnabotulinumtoxinA (BOTOX IJ), Inject 200 Units into the muscle Lot# /C3, Exp 09/2018, Disp: , Rfl:      OnabotulinumtoxinA (BOTOX IJ), Inject 175 Units into the muscle Lot# /C3, Exp 08/2018, Disp: , Rfl:      EPINEPHrine (EPIPEN JR) 0.15 MG/0.3ML injection, Inject 0.15 mg into the muscle as needed for anaphylaxis, Disp: , Rfl:      OnabotulinumtoxinA (BOTOX IJ), Inject 175 Units into the muscle Lot# /C3, Exp 10/2017, Disp: , Rfl:      OnabotulinumtoxinA (BOTOX IJ), Inject 175 Units as directed Lot # /C3  Exp: 8/2017, Disp: , Rfl:      OnabotulinumtoxinA (BOTOX IJ), Inject 175 Units into the muscle Lot# /C3, Exp 04/2017, Disp: , Rfl:      OnabotulinumtoxinA (BOTOX IJ), Inject 175 Units into the " muscle Lot # /C3  Exp: 2017, Disp: , Rfl:      olopatadine (PATANOL) 0.1 % ophthalmic solution, 1 drop, Disp: , Rfl:      penciclovir (DENAVIR) 1 % cream, Apply 1 g topically every 2 hours, Disp: , Rfl:      acetaminophen (TYLENOL) 500 MG tablet, Take 500-1,000 mg by mouth every 6 hours as needed for mild pain (can take 4 per day), Disp: , Rfl:      Allergies   Allergen Reactions     Morphine Sulfate Hives     Latex      CONTACT RASH WITH LATEX PRODUCTS IN THE HEAT OF SUMMER     Morphine Hives        PHYSICAL EXAM:  HEAD, NECK AND TRUNK PATTERN:   Tremor: Observed- no-no tremor   Forward Head: Present  Head flexion present  Head & Neck Rotation: Right  Head & Neck Lateral Bend: Left  Shoulder Elevation: right (postural)    HPI:    Patient denies new medical diagnoses, illnesses, hospitalizations, emergency room visits, and injuries since the previous injection with botulinum neurotoxin.    We reviewed the recommended safety guidelines for  Botox from any vaccine injection, such as the seasonal flu vaccine, by a minimum of 10-14 days with Kendra Vaca. She acknowledged understanding.    RESPONSE TO PREVIOUS TREATMENT:    Kendra Vaca received 200 units of Botox on 2017.    Problems following the previous series of neurotoxin injections included:  No problems reported    BENEFITS BY PATIENT REPORT:  Pain Improvement: Yes.  Percent Improvement: 80-90 %  Duration of Benefit:  10 weeks and followed by a gradual reduction in benefit.      Tremors and dystonia Improvement: Yes.  Percent Improvement: 80-90 %  Duration of Benefit:  10 weeks and followed by a gradual reduction in benefit.    BOTULINUM NEUROTOXIN INJECTION PROCEDURES:    VERIFICATION OF PATIENT IDENTIFICATION AND PROCEDURE     Initials   Patient Name ses   Patient  ses   Procedure Verified by: ses     Prior to the start of the procedure and with procedural staff participation, I verbally confirmed the patient s identity  using two indicators, relevant allergies, that the procedure was appropriate and matched the consent or emergent situation, and that the correct equipment/implants were available. Immediately prior to starting the procedure I conducted the Time Out with the procedural staff and re-confirmed the patient s name, procedure, and site/side. (The Joint Commission universal protocol was followed.)  Yes    Sedation (Moderate or Deep): None      Above assessments performed by:  Virginia Llamas MD      INDICATION/S FOR PROCEDURE/S:  Kendra Vaca is a 57-year-old patient with dystonia affecting the head, neck and shoulder girdle musculature secondary to a diagnosis of cervical and segmental dystonia with associated pain, tremor, loss of joint motion, loss of volitional motor control and difficulty with activities of daily living.       Her baseline symptoms have been recalcitrant to oral medications and conservative therapy. She is here today for re-evaluation and it was decided to proceed with re-injection of Botox.       TOTAL DOSE ADMINISTERED:  Dose Administered:  200 units Botox    Diluent Used:  Preservative Free Normal Saline  Total Volume of Diluent Used:  2.0 ml  Lot # /C3 with Expiration Date:  05/2020  NDC #: Botox 100u ([address]-17)    Medication guide was offered to patient and was declined.    CONSENT:  The risks, benefits, and treatment options were discussed with Kendra Vaca and she agreed to proceed.      Written consent was obtained by [facility].     EQUIPMENT USED:  Needle-37mm stimulating/recording  EMG/NCS Machine      SKIN PREPARATION:  Skin preparation was performed using an alcohol wipe.      GUIDANCE DESCRIPTION:  Electro-myographic guidance was necessary throughout the procedure to accurately identify all areas of dystonic muscles while avoiding injection of non-dystonic muscles, neighboring nerves and nearby vascular structures.     AREA/MUSCLE INJECTED:  200  Units Botox  1. HEAD & NECK MUSCLES: Total dose = 200 units Botox, 1:1 dilution   Right Mid-Trapezius - 20 units of Botox at 1 site/s.   Left Mid-Trapezius - 15 units of Botox at 1 site/s.      Right lateral Trapezius - 25 units of Botox at 2 site.  Left lateral Trapezius - 10 units of Botox at 1 site.      Right Splenius Cervicis - 25 units of Botox at 1 site/s.   Left Splenius Cervicis - 20 units of Botox at 1 site/s.      Right Levator Scapulae - 25 units of Botox at 2 site/s (shoulder muscles).   Left Levator Scapulae - 15 units of Botox at 2 site/s (shoulder muscles).      Right Longissimus Capitis - 10 units of Botox at 1 site/s.       Right Inferior Obliquus Capitis - 10 units of Botox at 1 site/s.                  Right Sternal head of the Sternocleidomastoid - 20 units of Botox at 1 site/s.       RESPONSE TO PROCEDURE:  Kendra Vaca tolerated the procedure well and there were no immediate complications.  She was allowed to recover for an appropriate period of time and was discharged home in stable condition.    FOLLOW UP:  Kendra Vaca was asked to follow up by phone in 7-14 days with Laura Lebron PT, Care Coordinator or Anita Howell RN, Care Coordinator, to report her response to this series of injections.  Based on the patient's previous response to this therapy, Kendra Vaca was rescheduled for the next series of injections in 10-11 weeks.    PLAN (Medication Changes, Therapy Orders, Work or Disability Issues, etc.): Will monitor response to today's injections and report.

## 2017-10-31 NOTE — MR AVS SNAPSHOT
After Visit Summary   10/31/2017    Kendra Vaca    MRN: [id number]           Patient Information     Date Of Birth          1960        Visit Information        Provider Department      10/31/2017 2:20 PM Eduard Llamas MD [facility]        Today's Diagnoses     Spasmodic torticollis    -  1    Dystonia, torsion, fragments of           Follow-ups after your visit        Your next 10 appointments already scheduled     2018  8:20 AM CST   (Arrive by 8:05 AM)   Return Botox with Eduard Llamas MD   [facility] ([facility])    [address]  [address] [facility] [address] [phone]              Who to contact     Please call your clinic at [phone] to:    Ask questions about your health    Make or cancel appointments    Discuss your medicines    Learn about your test results    Speak to your doctor   If you have compliments or concerns about an experience at your clinic, or if you wish to file a complaint, please contact [facility] Patient Relations at [phone] or email us at [email or web address]         Additional Information About Your Visit        MyChart Information     [employer] is an electronic gateway that provides easy, online access to your medical records. With [email or web address], you can request a clinic appointment, read your test results, renew a prescription or communicate with your care team.     To sign up for [email or web address] visit the website at [email or web address]   You will be asked to enter the access code listed below, as well as some personal information. Please follow the directions to create your username and password.     Your access code is: [email or web address]  Expires: 2017  4:03 PM     Your access code will  in 90 days. If you need help or a new code, please contact your [facility] or call [phone] for assistance.        Care EveryWhere ID     This is your Care EveryWhere ID. This could be used by other organizations to access your [facility] medical records  [id number]        Your Vitals Were     Pulse Temperature Height BMI (Body Mass Index)          97 97.6  F (36.4  C) (Oral) 1.651 m (5' 5\") 25.79 kg/m2         Blood Pressure from Last 3 Encounters:   10/31/17 108/73   08/24/17 110/83   06/07/17 163/86    Weight from Last 3 Encounters:   10/31/17 70.3 kg (155 lb)   06/07/17 70.4 kg (155 lb 1.6 oz)   03/22/17 72.4 kg (159 lb 11.2 oz)              We Performed the Following     HC CHEMODENERVATION 1 EXTREMITY, EA ADDL EXTREMITY, 1-4 MUSCLE     HC CHEMODENERVATION MUSCLE NECK UNILAT     HC CHEMODENERVATION ONE EXTREMITY, 1-4 MUSCLE     Needle EMG Guide w/Chemodenervation (07308)        Primary Care Provider Office Phone # Fax #    [place] [phone]       [facility] [address]  [facility] [address]        Equal Access to Services     VIVIANA BANEGAS AH: Hadii polo cotoo Sogarret, waaxda luqadaha, qaybta kaalmada adeegyada, shana lynn. So [facility] [phone].    ATENCIÓN: Si habla español, tiene a rodriguez disposición servicios gratuitos de asistencia lingüística. [facility] [phone].    We comply with applicable federal civil rights laws and [place] laws. We do not discriminate on the basis of race, color, national origin, age, disability, sex, sexual orientation, or gender identity.            Thank you!     Thank you for choosing [facility] MEDICINE [facility] REHABILITATION  for your care. Our goal is always to provide you with excellent care. Hearing back from our patients is one way we can continue to improve our services. Please take a few minutes to complete the written survey that you may receive in the mail after your visit with us. Thank you!             Your Updated Medication List - " Protect others around you: Learn how to safely use, store and throw away your medicines at www.disposemymeds.org.          This list is accurate as of: 10/31/17  3:41 PM.  Always use your most recent med list.                   Brand Name Dispense Instructions for use Diagnosis    acetaminophen 500 MG tablet    TYLENOL     Take 500-1,000 mg by mouth every 6 hours as needed for mild pain (can take 4 per day)        azelastine 0.05 % Soln ophthalmic solution    OPTIVAR     1 drop        * BOTOX IJ      Inject 200 Units as directed once Lot # /C3 with Expiration Date: 09/2019        * BOTOX IJ      Inject 175 Units into the muscle Lot # /C3  Exp: 1/2017        * BOTOX IJ      Inject 175 Units into the muscle Lot# /C3, Exp 04/2017        * BOTOX IJ      Inject 175 Units as directed Lot # /C3  Exp: 8/2017        * BOTOX IJ      Inject 175 Units into the muscle Lot# /C3, Exp 10/2017        * BOTOX IJ      Inject 175 Units into the muscle Lot# /C3, Exp 08/2018        * BOTOX IJ      Inject 200 Units into the muscle Lot# /C3, Exp 09/2018        * BOTOX IJ      Inject 200 Units as directed Lot # /C3 with Expiration Date:? 2/2019 NDC:  [address]-66  (100 unit vial of Botox)        * BOTOX IJ      Inject 200 Units into the muscle Lot # /C3  Exp: 5/2019        * BOTOX IJ      Inject 200 Units into the muscle once Lot #:  Exp: 08/2019        * BOTOX IJ      Inject 200 Units as directed once Lot#: [id number] Exp: 01/2020        * BOTOX IJ      Inject 200 Units into the muscle Lot # /C3  Exp: 4/2020        * BOTOX IJ      Inject 200 Units into the muscle once Lot # /C3 Expiration Date:  05/2020        DENAVIR 1 % cream   Generic drug:  penciclovir      Apply 1 g topically every 2 hours        ELESTAT 0.05 % Soln   Generic drug:  epinastine HCl      1 drop        EPINEPHrine 0.15 MG/0.3ML injection 2-pack    EPIPEN JR     Inject 0.15 mg into the muscle as needed for  anaphylaxis        PATANOL 0.1 % ophthalmic solution   Generic drug:  olopatadine      1 drop        * Notice:  This list has 13 medication(s) that are the same as other medications prescribed for you. Read the directions carefully, and ask your doctor or other care provider to review them with you.

## 2018-01-16 ENCOUNTER — OFFICE VISIT (OUTPATIENT)
Dept: PHYSICAL MEDICINE AND REHAB | Facility: CLINIC | Age: 58
End: 2018-01-16
Payer: MEDICARE

## 2018-01-16 VITALS
BODY MASS INDEX: 26.77 KG/M2 | DIASTOLIC BLOOD PRESSURE: 71 MMHG | HEART RATE: 72 BPM | TEMPERATURE: 97.4 F | HEIGHT: 65 IN | WEIGHT: 160.7 LBS | OXYGEN SATURATION: 95 % | SYSTOLIC BLOOD PRESSURE: 105 MMHG

## 2018-01-16 DIAGNOSIS — G24.3 SPASMODIC TORTICOLLIS: Primary | ICD-10-CM

## 2018-01-16 DIAGNOSIS — G24.1 DYSTONIA, TORSION, FRAGMENTS OF: ICD-10-CM

## 2018-01-16 ASSESSMENT — PAIN SCALES - GENERAL: PAINLEVEL: NO PAIN (0)

## 2018-01-16 NOTE — LETTER
"1/16/2018       RE: Kendra Vaca  [address] [place] [address]     Dear Colleague,    Thank you for referring your patient, Kendra Vaca, to the [facility] PH[facility]ICAL MEDICINE AND REHABILITATION at [facility]. Please see a copy of my visit note below.    BOTULINUM TOXIN PROCEDURE NOTE    Chief Complaint   Patient presents with     RECHECK     [facility] RETURN - BOTOX       /71 (BP Location: Right arm, Patient Position: Sitting, Cuff Size: Adult Regular)  Pulse 72  Temp 97.4  F (36.3  C) (Oral)  Ht 1.651 m (5' 5\")  Wt 72.9 kg (160 lb 11.2 oz)  SpO2 95%  BMI 26.74 kg/m2      Current Outpatient Prescriptions:      OnabotulinumtoxinA (BOTOX IJ), Inject 200 Units into the muscle once Lot # /C3 Expiration Date:  05/2020, Disp: , Rfl:      OnabotulinumtoxinA (BOTOX IJ), Inject 200 Units into the muscle Lot # /C3  Exp: 4/2020, Disp: , Rfl:      OnabotulinumtoxinA (BOTOX IJ), Inject 200 Units as directed once Lot#: [id number] Exp: 01/2020, Disp: , Rfl:      OnabotulinumtoxinA (BOTOX IJ), Inject 200 Units as directed once Lot # /C3 with Expiration Date: 09/2019, Disp: , Rfl:      OnabotulinumtoxinA (BOTOX IJ), Inject 200 Units into the muscle once Lot #:  Exp: 08/2019, Disp: , Rfl:      epinastine HCl (ELESTAT) 0.05 % SOLN, 1 drop, Disp: , Rfl:      azelastine (OPTIVAR) 0.05 % SOLN, 1 drop, Disp: , Rfl:      OnabotulinumtoxinA (BOTOX IJ), Inject 200 Units into the muscle Lot # /C3  Exp: 5/2019, Disp: , Rfl:      OnabotulinumtoxinA (BOTOX IJ), Inject 200 Units as directed Lot # /C3 with Expiration Date:  2/2019 NDC:  [address]-92  (100 unit vial of Botox), Disp: , Rfl:      OnabotulinumtoxinA (BOTOX IJ), Inject 200 Units into the muscle Lot# /C3, Exp 09/2018, Disp: , Rfl:      OnabotulinumtoxinA (BOTOX IJ), Inject 175 Units into the muscle Lot# /C3, Exp 08/2018, Disp: , Rfl:      EPINEPHrine (EPIPEN JR) 0.15 MG/0.3ML " injection, Inject 0.15 mg into the muscle as needed for anaphylaxis, Disp: , Rfl:      OnabotulinumtoxinA (BOTOX IJ), Inject 175 Units into the muscle Lot# /C3, Exp 10/2017, Disp: , Rfl:      OnabotulinumtoxinA (BOTOX IJ), Inject 175 Units as directed Lot # /C3  Exp: 8/2017, Disp: , Rfl:      OnabotulinumtoxinA (BOTOX IJ), Inject 175 Units into the muscle Lot# /C3, Exp 04/2017, Disp: , Rfl:      OnabotulinumtoxinA (BOTOX IJ), Inject 175 Units into the muscle Lot # /C3  Exp: 1/2017, Disp: , Rfl:      olopatadine (PATANOL) 0.1 % ophthalmic solution, 1 drop, Disp: , Rfl:      penciclovir (DENAVIR) 1 % cream, Apply 1 g topically every 2 hours, Disp: , Rfl:      acetaminophen (TYLENOL) 500 MG tablet, Take 500-1,000 mg by mouth every 6 hours as needed for mild pain (can take 4 per day), Disp: , Rfl:      Allergies   Allergen Reactions     Morphine Sulfate Hives     Latex      CONTACT RASH WITH LATEX PRODUCTS IN THE HEAT OF SUMMER     Morphine Hives        PHYSICAL EXAM:  HEAD, NECK AND TRUNK PATTERN:   Tremor: Observed- no-no tremor   Forward Head: Present  Head flexion present  Head & Neck Rotation: Right  Head & Neck Lateral Bend: Left  Shoulder Elevation: right (postural)    HPI:    Patient denies new medical diagnoses, illnesses, hospitalizations, emergency room visits, and injuries since the previous injection with botulinum neurotoxin.    We reviewed the recommended safety guidelines for  Botox from any vaccine injection, such as the seasonal flu vaccine, by a minimum of 10-14 days with Kendra Vaca. She acknowledged understanding.    RESPONSE TO PREVIOUS TREATMENT:    Kendra Vaca received 200 units of Botox on 10/31/2017.    Problems following the previous series of neurotoxin injections included:  No problems reported    BENEFITS BY PATIENT REPORT:  Pain Improvement: Yes.  Percent Improvement: 90 %  Duration of Benefit:   4 weeks and followed by a gradual reduction in  benefit.  This is less benefit from a standpoint than she typically gets but worked similar to previous cycles in terms of dystonia/tremor benefit.     Tremors and dystonia Improvement: Yes.  Percent Improvement: 90%  Duration of Benefit:   Up until 5 days ago and followed by a rapid reduction in benefit.    BOTULINUM NEUROTOXIN INJECTION PROCEDURES:    VERIFICATION OF PATIENT IDENTIFICATION AND PROCEDURE     Initials   Patient Name ems   Patient  ems   Procedure Verified by: ems     Prior to the start of the procedure and with procedural staff participation, I verbally confirmed the patient s identity using two indicators, relevant allergies, that the procedure was appropriate and matched the consent or emergent situation, and that the correct equipment/implants were available. Immediately prior to starting the procedure I conducted the Time Out with the procedural staff and re-confirmed the patient s name, procedure, and site/side. (The Joint Commission universal protocol was followed.)  Yes    Sedation (Moderate or Deep): None      Above assessments performed by:  Sudha Llamas MD      INDICATION/S FOR PROCEDURE/S:  Kendra Vaca is a 57-year-old patient with dystonia affecting the head, neck and shoulder girdle musculature secondary to a diagnosis of cervical and segmental dystonia with associated pain, tremor, loss of joint motion, loss of volitional motor control and difficulty with activities of daily living.       Her baseline symptoms have been recalcitrant to oral medications and conservative therapy. She is here today for re-evaluation and it was decided to proceed with re-injection of Botox.       TOTAL DOSE ADMINISTERED:  Dose Administered:  200 units Botox    Diluent Used:  Preservative Free Normal Saline  Total Volume of Diluent Used:  2.0 ml  Lot # /C3 with Expiration Date:  2020  NDC #: Botox 100u ([address]-12)    Medication guide was offered to patient and was  declined.    CONSENT:  The risks, benefits, and treatment options were discussed with Kendra Vaca and she agreed to proceed.      Written consent was obtained by EMS.     EQUIPMENT USED:  Needle-37mm stimulating/recording  EMG/NCS Machine      SKIN PREPARATION:  Skin preparation was performed using an alcohol wipe.      GUIDANCE DESCRIPTION:  Electro-myographic guidance was necessary throughout the procedure to accurately identify all areas of dystonic muscles while avoiding injection of non-dystonic muscles, neighboring nerves and nearby vascular structures.     AREA/MUSCLE INJECTED:  200 Units Botox  1. HEAD & NECK MUSCLES: Total dose = 200 units Botox, 1:1 dilution   Right Mid-Trapezius - 20 units of Botox at 1 site/s.   Left Mid-Trapezius - 15 units of Botox at 1 site/s.      Right lateral Trapezius - 25 units of Botox at 2 site.  Left lateral Trapezius - 10 units of Botox at 1 site.      Right Splenius Cervicis - 25 units of Botox at 1 site/s.   Left Splenius Cervicis - 20 units of Botox at 1 site/s.      Right Levator Scapulae - 25 units of Botox at 2 site/s (shoulder muscles).   Left Levator Scapulae - 15 units of Botox at 2 site/s (shoulder muscles).      Right Longissimus Capitis - 10 units of Botox at 1 site/s.       Right Inferior Obliquus Capitis - 10 units of Botox at 1 site/s.                  Right Sternal head of the Sternocleidomastoid - 20 units of Botox at 1 site/s.       RESPONSE TO PROCEDURE:  Kendra Vaca tolerated the procedure well and there were no immediate complications.  She was allowed to recover for an appropriate period of time and was discharged home in stable condition.    FOLLOW UP:  Kendra Vaca was asked to follow up by phone in 7-14 days with Laura Lebron PT, Care Coordinator or Anita Howell RN, Care Coordinator, to report her response to this series of injections.  Based on the patient's previous response to this therapy, Kendra Vaca was  rescheduled for the next series of injections in 10-11 weeks.    PLAN (Medication Changes, Therapy Orders, Work or Disability Issues, etc.): Will monitor response to today's injections and report.      Again, thank you for allowing me to participate in the care of your patient.      Sincerely,    Eduard Llamas MD

## 2018-01-16 NOTE — PROGRESS NOTES
"BOTULINUM TOXIN PROCEDURE NOTE    Chief Complaint   Patient presents with     RECHECK     [facility] RETURN - BOTOX       /71 (BP Location: Right arm, Patient Position: Sitting, Cuff Size: Adult Regular)  Pulse 72  Temp 97.4  F (36.3  C) (Oral)  Ht 1.651 m (5' 5\")  Wt 72.9 kg (160 lb 11.2 oz)  SpO2 95%  BMI 26.74 kg/m2      Current Outpatient Prescriptions:      OnabotulinumtoxinA (BOTOX IJ), Inject 200 Units into the muscle once Lot # /C3 Expiration Date:  05/2020, Disp: , Rfl:      OnabotulinumtoxinA (BOTOX IJ), Inject 200 Units into the muscle Lot # /C3  Exp: 4/2020, Disp: , Rfl:      OnabotulinumtoxinA (BOTOX IJ), Inject 200 Units as directed once Lot#: [id number] Exp: 01/2020, Disp: , Rfl:      OnabotulinumtoxinA (BOTOX IJ), Inject 200 Units as directed once Lot # /C3 with Expiration Date: 09/2019, Disp: , Rfl:      OnabotulinumtoxinA (BOTOX IJ), Inject 200 Units into the muscle once Lot #:  Exp: 08/2019, Disp: , Rfl:      epinastine HCl (ELESTAT) 0.05 % SOLN, 1 drop, Disp: , Rfl:      azelastine (OPTIVAR) 0.05 % SOLN, 1 drop, Disp: , Rfl:      OnabotulinumtoxinA (BOTOX IJ), Inject 200 Units into the muscle Lot # /C3  Exp: 5/2019, Disp: , Rfl:      OnabotulinumtoxinA (BOTOX IJ), Inject 200 Units as directed Lot # /C3 with Expiration Date:  2/2019 NDC:  [address]-94  (100 unit vial of Botox), Disp: , Rfl:      OnabotulinumtoxinA (BOTOX IJ), Inject 200 Units into the muscle Lot# /C3, Exp 09/2018, Disp: , Rfl:      OnabotulinumtoxinA (BOTOX IJ), Inject 175 Units into the muscle Lot# /C3, Exp 08/2018, Disp: , Rfl:      EPINEPHrine (EPIPEN JR) 0.15 MG/0.3ML injection, Inject 0.15 mg into the muscle as needed for anaphylaxis, Disp: , Rfl:      OnabotulinumtoxinA (BOTOX IJ), Inject 175 Units into the muscle Lot# /C3, Exp 10/2017, Disp: , Rfl:      OnabotulinumtoxinA (BOTOX IJ), Inject 175 Units as directed Lot # /C3  Exp: 8/2017, Disp: , Rfl:      OnabotulinumtoxinA " (BOTOX IJ), Inject 175 Units into the muscle Lot# /C3, Exp 04/2017, Disp: , Rfl:      OnabotulinumtoxinA (BOTOX IJ), Inject 175 Units into the muscle Lot # /C3  Exp: 1/2017, Disp: , Rfl:      olopatadine (PATANOL) 0.1 % ophthalmic solution, 1 drop, Disp: , Rfl:      penciclovir (DENAVIR) 1 % cream, Apply 1 g topically every 2 hours, Disp: , Rfl:      acetaminophen (TYLENOL) 500 MG tablet, Take 500-1,000 mg by mouth every 6 hours as needed for mild pain (can take 4 per day), Disp: , Rfl:      Allergies   Allergen Reactions     Morphine Sulfate Hives     Latex      CONTACT RASH WITH LATEX PRODUCTS IN THE HEAT OF SUMMER     Morphine Hives        PHYSICAL EXAM:  HEAD, NECK AND TRUNK PATTERN:   Tremor: Observed- no-no tremor   Forward Head: Present  Head flexion present  Head & Neck Rotation: Right  Head & Neck Lateral Bend: Left  Shoulder Elevation: right (postural)    HPI:    Patient denies new medical diagnoses, illnesses, hospitalizations, emergency room visits, and injuries since the previous injection with botulinum neurotoxin.    We reviewed the recommended safety guidelines for  Botox from any vaccine injection, such as the seasonal flu vaccine, by a minimum of 10-14 days with Kendra Vaca. She acknowledged understanding.    RESPONSE TO PREVIOUS TREATMENT:    Kendra Vaca received 200 units of Botox on 10/31/2017.    Problems following the previous series of neurotoxin injections included:  No problems reported    BENEFITS BY PATIENT REPORT:  Pain Improvement: Yes.  Percent Improvement: 90 %  Duration of Benefit:  4 weeks and followed by a gradual reduction in benefit.  This is less benefit from a standpoint than she typically gets but worked similar to previous cycles in terms of dystonia/tremor benefit.     Tremors and dystonia Improvement: Yes.  Percent Improvement: 90%  Duration of Benefit:  Up until 5 days ago and followed by a rapid reduction in benefit.    BOTULINUM  NEUROTOXIN INJECTION PROCEDURES:    VERIFICATION OF PATIENT IDENTIFICATION AND PROCEDURE     Initials   Patient Name ems   Patient  ems   Procedure Verified by: ems     Prior to the start of the procedure and with procedural staff participation, I verbally confirmed the patient s identity using two indicators, relevant allergies, that the procedure was appropriate and matched the consent or emergent situation, and that the correct equipment/implants were available. Immediately prior to starting the procedure I conducted the Time Out with the procedural staff and re-confirmed the patient s name, procedure, and site/side. (The Joint Commission universal protocol was followed.)  Yes    Sedation (Moderate or Deep): None      Above assessments performed by:  Sudha Llamas MD      INDICATION/S FOR PROCEDURE/S:  Kendra Vaca is a 57-year-old patient with dystonia affecting the head, neck and shoulder girdle musculature secondary to a diagnosis of cervical and segmental dystonia with associated pain, tremor, loss of joint motion, loss of volitional motor control and difficulty with activities of daily living.       Her baseline symptoms have been recalcitrant to oral medications and conservative therapy. She is here today for re-evaluation and it was decided to proceed with re-injection of Botox.       TOTAL DOSE ADMINISTERED:  Dose Administered:  200 units Botox    Diluent Used:  Preservative Free Normal Saline  Total Volume of Diluent Used:  2.0 ml  Lot # /C3 with Expiration Date:  2020  NDC #: Botox 100u ([address]-36)    Medication guide was offered to patient and was declined.    CONSENT:  The risks, benefits, and treatment options were discussed with Kendra Vaca and she agreed to proceed.      Written consent was obtained by EMS.     EQUIPMENT USED:  Needle-37mm stimulating/recording  EMG/NCS Machine      SKIN PREPARATION:  Skin preparation was performed using an alcohol  wipe.      GUIDANCE DESCRIPTION:  Electro-myographic guidance was necessary throughout the procedure to accurately identify all areas of dystonic muscles while avoiding injection of non-dystonic muscles, neighboring nerves and nearby vascular structures.     AREA/MUSCLE INJECTED:  200 Units Botox  1. HEAD & NECK MUSCLES: Total dose = 200 units Botox, 1:1 dilution   Right Mid-Trapezius - 20 units of Botox at 1 site/s.   Left Mid-Trapezius - 15 units of Botox at 1 site/s.      Right lateral Trapezius - 25 units of Botox at 2 site.  Left lateral Trapezius - 10 units of Botox at 1 site.      Right Splenius Cervicis - 25 units of Botox at 1 site/s.   Left Splenius Cervicis - 20 units of Botox at 1 site/s.      Right Levator Scapulae - 25 units of Botox at 2 site/s (shoulder muscles).   Left Levator Scapulae - 15 units of Botox at 2 site/s (shoulder muscles).      Right Longissimus Capitis - 10 units of Botox at 1 site/s.       Right Inferior Obliquus Capitis - 10 units of Botox at 1 site/s.                  Right Sternal head of the Sternocleidomastoid - 20 units of Botox at 1 site/s.       RESPONSE TO PROCEDURE:  Kendra Vaca tolerated the procedure well and there were no immediate complications.  She was allowed to recover for an appropriate period of time and was discharged home in stable condition.    FOLLOW UP:  Kendra Vaca was asked to follow up by phone in 7-14 days with Laura Lebron PT, Care Coordinator or Anita Howell RN, Care Coordinator, to report her response to this series of injections.  Based on the patient's previous response to this therapy, Kendra Vaca was rescheduled for the next series of injections in 10-11 weeks.    PLAN (Medication Changes, Therapy Orders, Work or Disability Issues, etc.): Will monitor response to today's injections and report.

## 2018-01-16 NOTE — MR AVS SNAPSHOT
After Visit Summary   1/16/2018    Kendra Vaca    MRN: [id number]           Patient Information     Date Of Birth          1960        Visit Information        Provider Department      1/16/2018 8:20 AM Eduard Llamas MD [facility]        Today's Diagnoses     Spasmodic torticollis    -  1    Dystonia, torsion, fragments of           Follow-ups after your visit        Follow-up notes from your care team     Return in about 10 weeks (around 3/27/2018) for Cervical Dystonia.      Your next 10 appointments already scheduled     Apr 05, 2018  1:10 PM CDT   (Arrive by 12:55 PM)   Return Botox with Eduard Llamas MD   [facility] ([facility])    [address] [place] [id number] [phone]            Jun 14, 2018  1:50 PM CDT   (Arrive by 1:35 PM)   Return Botox with Eduard Llamas MD   [facility] ([facility])    [address] [place] [id number] [phone]              Who to contact     Please call your clinic at [phone] to:    Ask questions about your health    Make or cancel appointments    Discuss your medicines    Learn about your test results    Speak to your doctor   If you have compliments or concerns about an experience at your clinic, or if you wish to file a complaint, please contact [facility] Patient Relations at [phone] or email us at [email or web address]         Additional Information About Your Visit        [employer] Information     [employer] is an electronic gateway that provides easy, online access to your medical records. With [employer], you can request a clinic appointment, read your test results, renew a prescription or communicate with your care team.     To sign up for [employer] visit the website at  [email or web address]   You will be asked to enter the access code listed below, as well as some personal information. Please follow the directions to create your username and password.     Your access code is: [email or web address]  Expires: 2018  6:30 AM     Your access code will  in 90 days. If you need help or a new code, please contact your [facility] or call [phone] for assistance.        Care EveryWhere ID     This is your Care EveryWhere ID. This could be used by other organizations to access your [facility] medical records  [id number]        Your Vitals Were     Pulse Temperature Height Pulse Oximetry BMI (Body Mass Index)       72 97.4  F (36.3  C) (Oral) 1.651 m (5' 5\") 95% 26.74 kg/m2        Blood Pressure from Last 3 Encounters:   18 105/71   10/31/17 108/73   17 110/83    Weight from Last 3 Encounters:   18 72.9 kg (160 lb 11.2 oz)   10/31/17 70.3 kg (155 lb)   17 70.4 kg (155 lb 1.6 oz)              We Performed the Following     HC CHEMODENERVATION 1 EXTREMITY, EA ADDL EXTREMITY, 1-4 MUSCLE     HC CHEMODENERVATION MUSCLE NECK UNILAT     HC CHEMODENERVATION ONE EXTREMITY, 1-4 MUSCLE     Needle EMG Guide w/Chemodenervation ([id number])        Primary Care Provider Office Phone # Fax #    [place] [phone]       [facility] [address]  [facility] [address]        Equal Access to Services     BINTA [facility] Hospital[facility]ICK[facility] : Hadii polo obrien hadasho Soomaali, waaxda luqadaha, qaybta kaalmada shana montenegro. So [facility] [phone].    ATENCIÓN: Si habla español, tiene a rodriguez disposición servicios gratuitos de asistencia lingüística. Llame al [phone].    We comply with applicable federal civil rights laws and [place] laws. We do not discriminate on the basis of race, color, national origin, age, disability, sex, sexual orientation, or gender identity.            Thank you!     Thank " you for choosing [facility]  for your care. Our goal is always to provide you with excellent care. Hearing back from our patients is one way we can continue to improve our services. Please take a few minutes to complete the written survey that you may receive in the mail after your visit with us. Thank you!             Your Updated Medication List - Protect others around you: Learn how to safely use, store and throw away your medicines at www.disposemymeds.org.          This list is accurate as of: 1/16/18  4:19 PM.  Always use your most recent med list.                   Brand Name Dispense Instructions for use Diagnosis    acetaminophen 500 MG tablet    TYLENOL     Take 500-1,000 mg by mouth every 6 hours as needed for mild pain (can take 4 per day)        azelastine 0.05 % Soln ophthalmic solution    OPTIVAR     1 drop        * BOTOX IJ      Inject 200 Units as directed once Lot # /C3 with Expiration Date: 09/2019        * BOTOX IJ      Inject 175 Units into the muscle Lot # /C3  Exp: 1/2017        * BOTOX IJ      Inject 175 Units into the muscle Lot# /C3, Exp 04/2017        * BOTOX IJ      Inject 175 Units as directed Lot # /C3  Exp: 8/2017        * BOTOX IJ      Inject 175 Units into the muscle Lot# /C3, Exp 10/2017        * BOTOX IJ      Inject 175 Units into the muscle Lot# /C3, Exp 08/2018        * BOTOX IJ      Inject 200 Units into the muscle Lot# /C3, Exp 09/2018        * BOTOX IJ      Inject 200 Units as directed Lot # /C3 with Expiration Date:? 2/2019 NDC:  [address]-86  (100 unit vial of Botox)        * BOTOX IJ      Inject 200 Units into the muscle Lot # /C3  Exp: 5/2019        * BOTOX IJ      Inject 200 Units into the muscle once Lot #:  Exp: 08/2019        * BOTOX IJ      Inject 200 Units as directed once Lot#: [id number] Exp: 01/2020        * BOTOX IJ      Inject 200 Units into the muscle Lot # /C3  Exp: 4/2020         * BOTOX IJ      Inject 200 Units into the muscle once Lot # /C3 Expiration Date:  05/2020        * BOTOX IJ      Inject 200 Units as directed once Lot # /C3 with Expiration Date:  8/2020        DENAVIR 1 % cream   Generic drug:  penciclovir      Apply 1 g topically every 2 hours        ELESTAT 0.05 % Soln   Generic drug:  epinastine HCl      1 drop        EPINEPHrine 0.15 MG/0.3ML injection 2-pack    EPIPEN JR     Inject 0.15 mg into the muscle as needed for anaphylaxis        PATANOL 0.1 % ophthalmic solution   Generic drug:  olopatadine      1 drop        * Notice:  This list has 14 medication(s) that are the same as other medications prescribed for you. Read the directions carefully, and ask your doctor or other care provider to review them with you.

## 2018-06-06 ENCOUNTER — OFFICE VISIT - HEALTHEAST (OUTPATIENT)
Dept: FAMILY MEDICINE | Facility: CLINIC | Age: 58
End: 2018-06-06

## 2018-06-06 ENCOUNTER — MEDICAL CORRESPONDENCE (OUTPATIENT)
Dept: HEALTH INFORMATION MANAGEMENT | Facility: CLINIC | Age: 58
End: 2018-06-06

## 2018-06-06 DIAGNOSIS — Z12.11 COLON CANCER SCREENING: ICD-10-CM

## 2018-06-06 DIAGNOSIS — Z72.0 NICOTINE ABUSE: ICD-10-CM

## 2018-06-06 DIAGNOSIS — R25.1 TREMOR: ICD-10-CM

## 2018-06-06 DIAGNOSIS — Z12.31 ENCOUNTER FOR SCREENING MAMMOGRAM FOR BREAST CANCER: ICD-10-CM

## 2018-06-06 DIAGNOSIS — G95.9 SPINAL CORD DISEASE (H): ICD-10-CM

## 2018-06-06 ASSESSMENT — MIFFLIN-ST. JEOR: SCORE: 1306.38

## 2018-06-13 ENCOUNTER — OFFICE VISIT (OUTPATIENT)
Dept: PHYSICAL MEDICINE AND REHAB | Facility: CLINIC | Age: 58
End: 2018-06-13
Payer: MEDICARE

## 2018-06-13 VITALS
HEIGHT: 65 IN | SYSTOLIC BLOOD PRESSURE: 150 MMHG | BODY MASS INDEX: 27.47 KG/M2 | HEART RATE: 72 BPM | DIASTOLIC BLOOD PRESSURE: 80 MMHG | WEIGHT: 164.9 LBS

## 2018-06-13 DIAGNOSIS — G24.1 DYSTONIA, TORSION, FRAGMENTS OF: ICD-10-CM

## 2018-06-13 DIAGNOSIS — G24.3 SPASMODIC TORTICOLLIS: Primary | ICD-10-CM

## 2018-06-13 ASSESSMENT — PAIN SCALES - GENERAL: PAINLEVEL: MODERATE PAIN (5)

## 2018-06-13 NOTE — LETTER
"6/13/2018     RE: Kendra Vaca  [address] [place] [address]     Dear Colleague,    Thank you for referring your patient, Kendra Vaca, to the [facility] at [facility]. Please see a copy of my visit note below.    BOTULINUM TOXIN PROCEDURE NOTE    Chief Complaint   Patient presents with     RECHECK     [facility]- BOTOX INJECTION       /80  Pulse 72  Ht 1.651 m (5' 5\")  Wt 74.8 kg (164 lb 14.4 oz)  BMI 27.44 kg/m2      Current Outpatient Prescriptions:      acetaminophen (TYLENOL) 500 MG tablet, Take 500-1,000 mg by mouth every 6 hours as needed for mild pain (can take 4 per day), Disp: , Rfl:      azelastine (OPTIVAR) 0.05 % SOLN, 1 drop, Disp: , Rfl:      epinastine HCl (ELESTAT) 0.05 % SOLN, 1 drop, Disp: , Rfl:      EPINEPHrine (EPIPEN JR) 0.15 MG/0.3ML injection, Inject 0.15 mg into the muscle as needed for anaphylaxis, Disp: , Rfl:      olopatadine (PATANOL) 0.1 % ophthalmic solution, 1 drop, Disp: , Rfl:      OnabotulinumtoxinA (BOTOX IJ), Inject 200 Units as directed once Lot # /C3 with Expiration Date:  8/2020, Disp: , Rfl:      OnabotulinumtoxinA (BOTOX IJ), Inject 200 Units into the muscle once Lot # /C3 Expiration Date:  05/2020, Disp: , Rfl:      OnabotulinumtoxinA (BOTOX IJ), Inject 200 Units into the muscle Lot # /C3  Exp: 4/2020, Disp: , Rfl:      OnabotulinumtoxinA (BOTOX IJ), Inject 200 Units as directed once Lot#: [id number] Exp: 01/2020, Disp: , Rfl:      OnabotulinumtoxinA (BOTOX IJ), Inject 200 Units as directed once Lot # /C3 with Expiration Date: 09/2019, Disp: , Rfl:      OnabotulinumtoxinA (BOTOX IJ), Inject 200 Units into the muscle once Lot #:  Exp: 08/2019, Disp: , Rfl:      OnabotulinumtoxinA (BOTOX IJ), Inject 200 Units into the muscle Lot # /C3  Exp: 5/2019, Disp: , Rfl:      OnabotulinumtoxinA (BOTOX IJ), Inject 200 Units as directed Lot # /C3 with " Expiration Date:  2/2019 NDC:  [address]-39  (100 unit vial of Botox), Disp: , Rfl:      OnabotulinumtoxinA (BOTOX IJ), Inject 200 Units into the muscle Lot# /C3, Exp 09/2018, Disp: , Rfl:      OnabotulinumtoxinA (BOTOX IJ), Inject 175 Units into the muscle Lot# /C3, Exp 08/2018, Disp: , Rfl:      OnabotulinumtoxinA (BOTOX IJ), Inject 175 Units into the muscle Lot# /C3, Exp 10/2017, Disp: , Rfl:      OnabotulinumtoxinA (BOTOX IJ), Inject 175 Units as directed Lot # /C3  Exp: 8/2017, Disp: , Rfl:      OnabotulinumtoxinA (BOTOX IJ), Inject 175 Units into the muscle Lot# /C3, Exp 04/2017, Disp: , Rfl:      OnabotulinumtoxinA (BOTOX IJ), Inject 175 Units into the muscle Lot # /C3  Exp: 1/2017, Disp: , Rfl:      penciclovir (DENAVIR) 1 % cream, Apply 1 g topically every 2 hours, Disp: , Rfl:      Allergies   Allergen Reactions     Morphine Hives     Morphine Sulfate Hives     Seafood Hives     Latex      CONTACT RASH WITH LATEX PRODUCTS IN THE HEAT OF SUMMER        PHYSICAL EXAM:  HEAD, NECK AND TRUNK PATTERN:   Erratic large amplitude yes yes and no no  Slight bias toward right torticollis  Slightly decreased ROM with head turning to left    HPI:    Injections were delayed due to shingles.    We reviewed the recommended safety guidelines for  Botox from any vaccine injection, such as the seasonal flu vaccine, by a minimum of 10-14 days with Kendra Vaca. She acknowledged understanding.    RESPONSE TO PREVIOUS TREATMENT:    Kendra Vaca received 200 units of Botox on 1/16/2018.    Problems following the previous series of neurotoxin injections included:  No problems reported    BENEFITS BY PATIENT REPORT:  Pain Improvement: Yes.  Percent Improvement: 90 %  Duration of Benefit:  4 weeks and followed by a gradual reduction in benefit.      Tremors and dystonia Improvement: Yes.  Percent Improvement: 90%  Duration of Benefit:   Lasted up until about 3 days before next  Botox was due    BOTULINUM NEUROTOXIN INJECTION PROCEDURES:    VERIFICATION OF PATIENT IDENTIFICATION AND PROCEDURE     Initials   Patient Name EMJ   Patient  EMJ   Procedure Verified by: EMJ     Prior to the start of the procedure and with procedural staff participation, I verbally confirmed the patient s identity using two indicators, relevant allergies, that the procedure was appropriate and matched the consent or emergent situation, and that the correct equipment/implants were available. Immediately prior to starting the procedure I conducted the Time Out with the procedural staff and re-confirmed the patient s name, procedure, and site/side. (The Joint Commission universal protocol was followed.)  Yes    Sedation (Moderate or Deep): None      Above assessments performed by:  Sudha Llamas MD      INDICATION/S FOR PROCEDURE/S:  Kendra Vaca is a 57-year-old patient with dystonia affecting the head, neck and shoulder girdle musculature secondary to a diagnosis of cervical and segmental dystonia with associated pain, tremor, loss of joint motion, loss of volitional motor control and difficulty with activities of daily living.       Her baseline symptoms have been recalcitrant to oral medications and conservative therapy. She is here today for re-evaluation and it was decided to proceed with re-injection of Botox.       TOTAL DOSE ADMINISTERED:  Dose Administered:  200 units Botox    Diluent Used:  Preservative Free Normal Saline  Total Volume of Diluent Used:  2.0 ml  Lot # /C3 with Expiration Date:  2020  NDC #: Botox 100u ([address]-59)    Medication guide was offered to patient and was declined.    CONSENT:  The risks, benefits, and treatment options were discussed with Kendra Vaca and she agreed to proceed.      Written consent was obtained by EMILY.     EQUIPMENT USED:  Needle-37mm stimulating/recording  EMG/NCS Machine      SKIN PREPARATION:  Skin preparation was  performed using an alcohol wipe.      GUIDANCE DESCRIPTION:  Electro-myographic guidance was necessary throughout the procedure to accurately identify all areas of dystonic muscles while avoiding injection of non-dystonic muscles, neighboring nerves and nearby vascular structures.     AREA/MUSCLE INJECTED:  200 Units Botox  1. HEAD & NECK MUSCLES: Total dose = 200 units Botox, 1:1 dilution   Right Mid-Trapezius - 20 units of Botox at 1 site/s.   Left Mid-Trapezius - 15 units of Botox at 1 site/s.      Right lateral Trapezius - 25 units of Botox at 2 site.  Left lateral Trapezius - 10 units of Botox at 1 site.      Right Splenius Cervicis - 25 units of Botox at 1 site/s.   Left Splenius Cervicis - 20 units of Botox at 1 site/s.      Right Levator Scapulae - 25 units of Botox at 2 site/s (shoulder muscles).   Left Levator Scapulae - 15 units of Botox at 2 site/s (shoulder muscles).      Right Longissimus Capitis - 10 units of Botox at 1 site/s.       Right Inferior Obliquus Capitis - 10 units of Botox at 1 site/s.                  Right Sternal head of the Sternocleidomastoid - 20 units of Botox at 1 site/s.       RESPONSE TO PROCEDURE:  Kendra Vaca tolerated the procedure well and there were no immediate complications.  She was allowed to recover for an appropriate period of time and was discharged home in stable condition.    FOLLOW UP:  Kendra Vaca was asked to follow up by phone in 7-14 days with Laura Lebron PT, Care Coordinator or Anita Howell RN, Care Coordinator, to report her response to this series of injections.  Based on the patient's previous response to this therapy, Kendra Vaca was rescheduled for the next series of injections in 10-11 weeks.    PLAN (Medication Changes, Therapy Orders, Work or Disability Issues, etc.): Will monitor response to today's injections and report.    Again, thank you for allowing me to participate in the care of your patient.       Sincerely,    Eduard Llamas MD

## 2018-06-13 NOTE — NURSING NOTE
Chief Complaint   Patient presents with     RECHECK     [facility]- BOTOX INJECTION     Zulema Malcolm MA

## 2018-06-13 NOTE — MR AVS SNAPSHOT
After Visit Summary   2018    Kendra Vaca    MRN: [id number]           Patient Information     Date Of Birth          1960        Visit Information        Provider Department      2018 9:20 AM Eduard Llamas MD [facility] Physical Medicine and Rehabilitation        Today's Diagnoses     Spasmodic torticollis    -  1    Dystonia, torsion, fragments of           Follow-ups after your visit        Follow-up notes from your care team     Return in about 10 weeks (around 2018) for Cervical Dystonia.      Your next 10 appointments already scheduled     Aug 27, 2018  1:00 PM CDT   (Arrive by 12:45 PM)   Return Botox with MD RASHARD Hutchinson [facility] Physical Medicine and Rehabilitation ([facility])    [address] [place] [id number] [phone]              Who to contact     Please call your clinic at [phone] to:    Ask questions about your health    Make or cancel appointments    Discuss your medicines    Learn about your test results    Speak to your doctor            Additional Information About Your Visit        MyChart Information     [employer] is an electronic gateway that provides easy, online access to your medical records. With [employer], you can request a clinic appointment, read your test results, renew a prescription or communicate with your care team.     To sign up for [employer] visit the website at [email or web address]   You will be asked to enter the access code listed below, as well as some personal information. Please follow the directions to create your username and password.     Your access code is: [email or web address]  Expires: 2018  6:30 AM     Your access code will  in 90 days. If you need help or a new code, please contact your [facility] or call [phone] for assistance.        Care EveryWhere ID     This is your Care EveryWhere ID. This could be  "used by other organizations to access your [facility] medical records  [id number]        Your Vitals Were     Pulse Height BMI (Body Mass Index)             72 1.651 m (5' 5\") 27.44 kg/m2          Blood Pressure from Last 3 Encounters:   06/13/18 150/80   01/16/18 105/71   10/31/17 108/73    Weight from Last 3 Encounters:   06/13/18 74.8 kg (164 lb 14.4 oz)   01/16/18 72.9 kg (160 lb 11.2 oz)   10/31/17 70.3 kg (155 lb)              Today, you had the following     No orders found for display       Primary Care Provider Office Phone # Fax #    [place] [phone]       [facility] [address]  [facility] [address]        Equal Access to Services     VIVIANA BANEGAS : Hadii polo fleming Sogarret, waaxda luqadaha, qaybta kaalmada moni, shana richardson . So [facility] [phone].    ATENCIÓN: Si habla español, tiene a rodriguez disposición servicios gratuitos de asistencia lingüística. Kimberly al [phone].    We comply with applicable federal civil rights laws and [place] laws. We do not discriminate on the basis of race, color, national origin, age, disability, sex, sexual orientation, or gender identity.            Thank you!     Thank you for choosing [facility] PHYSICAL MEDICINE AND REHABILITATION  for your care. Our goal is always to provide you with excellent care. Hearing back from our patients is one way we can continue to improve our services. Please take a few minutes to complete the written survey that you may receive in the mail after your visit with us. Thank you!             Your Updated Medication List - Protect others around you: Learn how to safely use, store and throw away your medicines at www.disposemymeds.org.          This list is accurate as of 6/13/18 10:06 AM.  Always use your most recent med list.                   Brand Name Dispense Instructions for use Diagnosis    acetaminophen 500 MG tablet    TYLENOL     Take 500-1,000 mg by " mouth every 6 hours as needed for mild pain (can take 4 per day)        azelastine 0.05 % Soln ophthalmic solution    OPTIVAR     1 drop        * BOTOX IJ      Inject 200 Units as directed once Lot # /C3 with Expiration Date: 09/2019        * BOTOX IJ      Inject 175 Units into the muscle Lot # /C3  Exp: 1/2017        * BOTOX IJ      Inject 175 Units into the muscle Lot# /C3, Exp 04/2017        * BOTOX IJ      Inject 175 Units as directed Lot # /C3  Exp: 8/2017        * BOTOX IJ      Inject 175 Units into the muscle Lot# /C3, Exp 10/2017        * BOTOX IJ      Inject 175 Units into the muscle Lot# /C3, Exp 08/2018        * BOTOX IJ      Inject 200 Units into the muscle Lot# /C3, Exp 09/2018        * BOTOX IJ      Inject 200 Units as directed Lot # /C3 with Expiration Date:? 2/2019 NDC:  [address]-24  (100 unit vial of Botox)        * BOTOX IJ      Inject 200 Units into the muscle Lot # /C3  Exp: 5/2019        * BOTOX IJ      Inject 200 Units into the muscle once Lot #:  Exp: 08/2019        * BOTOX IJ      Inject 200 Units as directed once Lot#: [id number] Exp: 01/2020        * BOTOX IJ      Inject 200 Units into the muscle Lot # /C3  Exp: 4/2020        * BOTOX IJ      Inject 200 Units into the muscle once Lot # /C3 Expiration Date:  05/2020        * BOTOX IJ      Inject 200 Units as directed once Lot # /C3 with Expiration Date:  8/2020        * BOTOX IJ      Inject 200 Units as directed once /C3 Exp 11/2020        DENAVIR 1 % cream   Generic drug:  penciclovir      Apply 1 g topically every 2 hours        ELESTAT 0.05 % Soln   Generic drug:  epinastine HCl      1 drop        EPINEPHrine 0.15 MG/0.3ML injection 2-pack    EPIPEN JR     Inject 0.15 mg into the muscle as needed for anaphylaxis        PATANOL 0.1 % ophthalmic solution   Generic drug:  olopatadine      1 drop        * Notice:  This list has 15 medication(s) that are the same as other  medications prescribed for you. Read the directions carefully, and ask your doctor or other care provider to review them with you.

## 2018-06-13 NOTE — PROGRESS NOTES
"BOTULINUM TOXIN PROCEDURE NOTE    Chief Complaint   Patient presents with     RECHECK     [facility]- BOTOX INJECTION       /80  Pulse 72  Ht 1.651 m (5' 5\")  Wt 74.8 kg (164 lb 14.4 oz)  BMI 27.44 kg/m2      Current Outpatient Prescriptions:      acetaminophen (TYLENOL) 500 MG tablet, Take 500-1,000 mg by mouth every 6 hours as needed for mild pain (can take 4 per day), Disp: , Rfl:      azelastine (OPTIVAR) 0.05 % SOLN, 1 drop, Disp: , Rfl:      epinastine HCl (ELESTAT) 0.05 % SOLN, 1 drop, Disp: , Rfl:      EPINEPHrine (EPIPEN JR) 0.15 MG/0.3ML injection, Inject 0.15 mg into the muscle as needed for anaphylaxis, Disp: , Rfl:      olopatadine (PATANOL) 0.1 % ophthalmic solution, 1 drop, Disp: , Rfl:      OnabotulinumtoxinA (BOTOX IJ), Inject 200 Units as directed once Lot # /C3 with Expiration Date:  8/2020, Disp: , Rfl:      OnabotulinumtoxinA (BOTOX IJ), Inject 200 Units into the muscle once Lot # /C3 Expiration Date:  05/2020, Disp: , Rfl:      OnabotulinumtoxinA (BOTOX IJ), Inject 200 Units into the muscle Lot # /C3  Exp: 4/2020, Disp: , Rfl:      OnabotulinumtoxinA (BOTOX IJ), Inject 200 Units as directed once Lot#: [id number] Exp: 01/2020, Disp: , Rfl:      OnabotulinumtoxinA (BOTOX IJ), Inject 200 Units as directed once Lot # /C3 with Expiration Date: 09/2019, Disp: , Rfl:      OnabotulinumtoxinA (BOTOX IJ), Inject 200 Units into the muscle once Lot #:  Exp: 08/2019, Disp: , Rfl:      OnabotulinumtoxinA (BOTOX IJ), Inject 200 Units into the muscle Lot # /C3  Exp: 5/2019, Disp: , Rfl:      OnabotulinumtoxinA (BOTOX IJ), Inject 200 Units as directed Lot # /C3 with Expiration Date:  2/2019 NDC:  [address]-73  (100 unit vial of Botox), Disp: , Rfl:      OnabotulinumtoxinA (BOTOX IJ), Inject 200 Units into the muscle Lot# /C3, Exp 09/2018, Disp: , Rfl:      OnabotulinumtoxinA (BOTOX IJ), Inject 175 Units into the muscle Lot# /C3, Exp 08/2018, Disp: , Rfl:      " OnabotulinumtoxinA (BOTOX IJ), Inject 175 Units into the muscle Lot# /C3, Exp 10/2017, Disp: , Rfl:      OnabotulinumtoxinA (BOTOX IJ), Inject 175 Units as directed Lot # /C3  Exp: 2017, Disp: , Rfl:      OnabotulinumtoxinA (BOTOX IJ), Inject 175 Units into the muscle Lot# /C3, Exp 2017, Disp: , Rfl:      OnabotulinumtoxinA (BOTOX IJ), Inject 175 Units into the muscle Lot # /C3  Exp: 2017, Disp: , Rfl:      penciclovir (DENAVIR) 1 % cream, Apply 1 g topically every 2 hours, Disp: , Rfl:      Allergies   Allergen Reactions     Morphine Hives     Morphine Sulfate Hives     Seafood Hives     Latex      CONTACT RASH WITH LATEX PRODUCTS IN THE HEAT OF SUMMER        PHYSICAL EXAM:  HEAD, NECK AND TRUNK PATTERN:   Erratic large amplitude yes yes and no no  Slight bias toward right torticollis  Slightly decreased ROM with head turning to left    HPI:    Injections were delayed due to shingles.    We reviewed the recommended safety guidelines for  Botox from any vaccine injection, such as the seasonal flu vaccine, by a minimum of 10-14 days with Kendra Vaca. She acknowledged understanding.    RESPONSE TO PREVIOUS TREATMENT:    Kendra Vaca received 200 units of Botox on 2018.    Problems following the previous series of neurotoxin injections included:  No problems reported    BENEFITS BY PATIENT REPORT:  Pain Improvement: Yes.  Percent Improvement: 90 %  Duration of Benefit:  4 weeks and followed by a gradual reduction in benefit.      Tremors and dystonia Improvement: Yes.  Percent Improvement: 90%  Duration of Benefit:  Lasted up until about 3 days before next Botox was due    BOTULINUM NEUROTOXIN INJECTION PROCEDURES:    VERIFICATION OF PATIENT IDENTIFICATION AND PROCEDURE     Initials   Patient Name EMJ   Patient  EMJ   Procedure Verified by: EMJ     Prior to the start of the procedure and with procedural staff participation, I verbally confirmed the patient s  identity using two indicators, relevant allergies, that the procedure was appropriate and matched the consent or emergent situation, and that the correct equipment/implants were available. Immediately prior to starting the procedure I conducted the Time Out with the procedural staff and re-confirmed the patient s name, procedure, and site/side. (The Joint Commission universal protocol was followed.)  Yes    Sedation (Moderate or Deep): None      Above assessments performed by:  Sudha Llamas MD      INDICATION/S FOR PROCEDURE/S:  Kendra Vaca is a 57-year-old patient with dystonia affecting the head, neck and shoulder girdle musculature secondary to a diagnosis of cervical and segmental dystonia with associated pain, tremor, loss of joint motion, loss of volitional motor control and difficulty with activities of daily living.       Her baseline symptoms have been recalcitrant to oral medications and conservative therapy. She is here today for re-evaluation and it was decided to proceed with re-injection of Botox.       TOTAL DOSE ADMINISTERED:  Dose Administered:  200 units Botox    Diluent Used:  Preservative Free Normal Saline  Total Volume of Diluent Used:  2.0 ml  Lot # /C3 with Expiration Date:  11/2020  NDC #: Botox 100u ([address]-53)    Medication guide was offered to patient and was declined.    CONSENT:  The risks, benefits, and treatment options were discussed with Kendra Vaca and she agreed to proceed.      Written consent was obtained by EMJ.     EQUIPMENT USED:  Needle-37mm stimulating/recording  EMG/NCS Machine      SKIN PREPARATION:  Skin preparation was performed using an alcohol wipe.      GUIDANCE DESCRIPTION:  Electro-myographic guidance was necessary throughout the procedure to accurately identify all areas of dystonic muscles while avoiding injection of non-dystonic muscles, neighboring nerves and nearby vascular structures.     AREA/MUSCLE INJECTED:   200 Units Botox  1. HEAD & NECK MUSCLES: Total dose = 200 units Botox, 1:1 dilution   Right Mid-Trapezius - 20 units of Botox at 1 site/s.   Left Mid-Trapezius - 15 units of Botox at 1 site/s.      Right lateral Trapezius - 25 units of Botox at 2 site.  Left lateral Trapezius - 10 units of Botox at 1 site.      Right Splenius Cervicis - 25 units of Botox at 1 site/s.   Left Splenius Cervicis - 20 units of Botox at 1 site/s.      Right Levator Scapulae - 25 units of Botox at 2 site/s (shoulder muscles).   Left Levator Scapulae - 15 units of Botox at 2 site/s (shoulder muscles).      Right Longissimus Capitis - 10 units of Botox at 1 site/s.       Right Inferior Obliquus Capitis - 10 units of Botox at 1 site/s.                  Right Sternal head of the Sternocleidomastoid - 20 units of Botox at 1 site/s.       RESPONSE TO PROCEDURE:  Kendra Vaca tolerated the procedure well and there were no immediate complications.  She was allowed to recover for an appropriate period of time and was discharged home in stable condition.    FOLLOW UP:  Kendra Vaca was asked to follow up by phone in 7-14 days with Laura Lebron PT, Care Coordinator or Anita Howell RN, Care Coordinator, to report her response to this series of injections.  Based on the patient's previous response to this therapy, Kendra Vaca was rescheduled for the next series of injections in 10-11 weeks.    PLAN (Medication Changes, Therapy Orders, Work or Disability Issues, etc.): Will monitor response to today's injections and report.

## 2018-07-09 ENCOUNTER — RECORDS - HEALTHEAST (OUTPATIENT)
Dept: ADMINISTRATIVE | Facility: OTHER | Age: 58
End: 2018-07-09

## 2018-07-11 ENCOUNTER — RECORDS - HEALTHEAST (OUTPATIENT)
Dept: ADMINISTRATIVE | Facility: OTHER | Age: 58
End: 2018-07-11

## 2018-08-28 ENCOUNTER — PATIENT OUTREACH (OUTPATIENT)
Dept: CARE COORDINATION | Facility: CLINIC | Age: 58
End: 2018-08-28

## 2018-08-30 ENCOUNTER — OFFICE VISIT (OUTPATIENT)
Dept: PHYSICAL MEDICINE AND REHAB | Facility: CLINIC | Age: 58
End: 2018-08-30
Payer: MEDICARE

## 2018-08-30 VITALS
BODY MASS INDEX: 28.05 KG/M2 | HEART RATE: 77 BPM | DIASTOLIC BLOOD PRESSURE: 91 MMHG | TEMPERATURE: 98 F | HEIGHT: 63 IN | OXYGEN SATURATION: 96 % | SYSTOLIC BLOOD PRESSURE: 131 MMHG | WEIGHT: 158.3 LBS

## 2018-08-30 DIAGNOSIS — G24.1 FRAGMENTS OF TORSION DYSTONIA: ICD-10-CM

## 2018-08-30 DIAGNOSIS — G24.3 SPASMODIC TORTICOLLIS: Primary | ICD-10-CM

## 2018-08-30 PROBLEM — H52.4 HYPEROPIA OF BOTH EYES WITH ASTIGMATISM AND PRESBYOPIA: Status: ACTIVE | Noted: 2018-01-16

## 2018-08-30 PROBLEM — G95.9: Status: ACTIVE | Noted: 2018-06-06

## 2018-08-30 PROBLEM — Z72.0 NICOTINE ABUSE: Status: ACTIVE | Noted: 2018-06-06

## 2018-08-30 PROBLEM — H52.03 HYPEROPIA OF BOTH EYES WITH ASTIGMATISM AND PRESBYOPIA: Status: ACTIVE | Noted: 2018-01-16

## 2018-08-30 PROBLEM — H52.203 HYPEROPIA OF BOTH EYES WITH ASTIGMATISM AND PRESBYOPIA: Status: ACTIVE | Noted: 2018-01-16

## 2018-08-30 ASSESSMENT — PAIN SCALES - GENERAL: PAINLEVEL: NO PAIN (0)

## 2018-08-30 NOTE — LETTER
"8/30/2018       RE: Kendra Vaca  [address] [place] [address]     Dear Colleague,    Thank you for referring your patient, Kendra Vaca, to the [facility] PHYSICAL MEDICINE AND REHABILITATION at [facility]. Please see a copy of my visit note below.    BOTULINUM TOXIN PROCEDURE NOTE    Chief Complaint   Patient presents with     RECHECK     [facility] RETURN - BOTOX     BP (!) 131/91 (BP Location: Right arm, Patient Position: Chair, Cuff Size: Adult Regular)  Pulse 77  Temp 98  F (36.7  C) (Oral)  Ht 1.6 m (5' 3\")  Wt 71.8 kg (158 lb 4.8 oz)  LMP 02/15/2012  SpO2 96%  Breastfeeding? No  BMI 28.04 kg/m2    Current Outpatient Prescriptions:      OnabotulinumtoxinA (BOTOX IJ), Inject 200 Units as directed once /C3 Exp 11/2020, Disp: , Rfl:      OnabotulinumtoxinA (BOTOX IJ), Inject 200 Units as directed once Lot # /C3 with Expiration Date:  8/2020, Disp: , Rfl:      OnabotulinumtoxinA (BOTOX IJ), Inject 200 Units into the muscle once Lot # /C3 Expiration Date:  05/2020, Disp: , Rfl:      OnabotulinumtoxinA (BOTOX IJ), Inject 200 Units into the muscle Lot # /C3  Exp: 4/2020, Disp: , Rfl:      OnabotulinumtoxinA (BOTOX IJ), Inject 200 Units as directed once Lot#: [id number] Exp: 01/2020, Disp: , Rfl:      OnabotulinumtoxinA (BOTOX IJ), Inject 200 Units as directed once Lot # /C3 with Expiration Date: 09/2019, Disp: , Rfl:      OnabotulinumtoxinA (BOTOX IJ), Inject 200 Units into the muscle once Lot #:  Exp: 08/2019, Disp: , Rfl:      OnabotulinumtoxinA (BOTOX IJ), Inject 200 Units into the muscle Lot # /C3  Exp: 5/2019, Disp: , Rfl:      OnabotulinumtoxinA (BOTOX IJ), Inject 200 Units as directed Lot # /C3 with Expiration Date:  2/2019 NDC:  [address]-25  (100 unit vial of Botox), Disp: , Rfl:      OnabotulinumtoxinA (BOTOX IJ), Inject 200 Units into the muscle Lot# /C3, Exp 09/2018, Disp: , Rfl:      " OnabotulinumtoxinA (BOTOX IJ), Inject 175 Units into the muscle Lot# /C3, Exp 08/2018, Disp: , Rfl:      OnabotulinumtoxinA (BOTOX IJ), Inject 175 Units into the muscle Lot# /C3, Exp 10/2017, Disp: , Rfl:      OnabotulinumtoxinA (BOTOX IJ), Inject 175 Units as directed Lot # /C3  Exp: 8/2017, Disp: , Rfl:      OnabotulinumtoxinA (BOTOX IJ), Inject 175 Units into the muscle Lot# /C3, Exp 04/2017, Disp: , Rfl:      OnabotulinumtoxinA (BOTOX IJ), Inject 175 Units into the muscle Lot # /C3  Exp: 1/2017, Disp: , Rfl:      penciclovir (DENAVIR) 1 % cream, Apply 1 g topically every 2 hours, Disp: , Rfl:      acetaminophen (TYLENOL) 500 MG tablet, Take 500-1,000 mg by mouth every 6 hours as needed for mild pain (can take 4 per day), Disp: , Rfl:      azelastine (OPTIVAR) 0.05 % SOLN, 1 drop, Disp: , Rfl:      epinastine HCl (ELESTAT) 0.05 % SOLN, 1 drop, Disp: , Rfl:      EPINEPHrine (EPIPEN JR) 0.15 MG/0.3ML injection, Inject 0.15 mg into the muscle as needed for anaphylaxis, Disp: , Rfl:      olopatadine (PATANOL) 0.1 % ophthalmic solution, 1 drop, Disp: , Rfl:      Allergies   Allergen Reactions     Morphine Hives     Morphine Sulfate Hives     Seafood Hives     Latex      CONTACT RASH WITH LATEX PRODUCTS IN THE HEAT OF SUMMER        PHYSICAL EXAM:  HEAD, NECK AND TRUNK PATTERN:   Erratic large amplitude yes yes and no no  Slight bias toward right torticollis  Slightly decreased ROM with head turning to left    HPI:    Patient denies new medical diagnoses, illnesses, hospitalizations, emergency room visits, and injuries since the previous injection with botulinum neurotoxin.     We reviewed the recommended safety guidelines for  Botox from any vaccine injection, such as the seasonal flu vaccine, by a minimum of 10-14 days with Kendradeven Vaca. She acknowledged understanding.    RESPONSE TO PREVIOUS TREATMENT:    Kendra DONELL Genetbartolo received 200 units of 6/13/2018.  She has  previously recieved 200 units of Botox on 2018.    Problems following the previous series of neurotoxin injections included:  No problems reported    BENEFITS BY PATIENT REPORT:  Pain Improvement: Yes.  Percent Improvement: 90 %  Duration of Benefit:  4 weeks and followed by a gradual reduction in benefit.      Tremors and dystonia Improvement: Yes.  Percent Improvement: 90%  Duration of Benefit:  Lasted up until about 3 days before next Botox was due    BOTULINUM NEUROTOXIN INJECTION PROCEDURES:    VERIFICATION OF PATIENT IDENTIFICATION AND PROCEDURE     Initials   Patient Name map   Patient  map   Procedure Verified by: map     Prior to the start of the procedure and with procedural staff participation, I verbally confirmed the patient s identity using two indicators, relevant allergies, that the procedure was appropriate and matched the consent or emergent situation, and that the correct equipment/implants were available. Immediately prior to starting the procedure I conducted the Time Out with the procedural staff and re-confirmed the patient s name, procedure, and site/side. 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       Her baseline symptoms have been recalcitrant to oral medications and conservative therapy. She is here today for re-evaluation and it was decided to proceed with re-injection of Botox.       TOTAL DOSE ADMINISTERED:  Dose Administered:  200 units Botox    Diluent Used:  Preservative Free  Normal Saline  Total Volume of Diluent Used:  2.0 ml  Lot # [id number] with Expiration Date:  01/2021  NDC #: Botox 100u ([address]-30)    Medication guide was offered to patient and was declined.    CONSENT:  The risks, benefits, and treatment options were discussed with Kendra Vaca and she agreed to proceed.      Written consent was obtained by [facility].     EQUIPMENT USED:  Needle-37mm stimulating/recording  EMG/NCS Machine      SKIN PREPARATION:  Skin preparation was performed using an alcohol wipe.      GUIDANCE DESCRIPTION:  Electro-myographic guidance was necessary throughout the procedure to accurately identify all areas of dystonic muscles while avoiding injection of non-dystonic muscles, neighboring nerves and nearby vascular structures.     AREA/MUSCLE INJECTED:  200 UNITS BOTOX = TOTAL DOSE, 1:1 DILUTION    1. HEAD & NECK MUSCLES: Total dose = 200 units Botox, 1:1 dilution   Right Mid-Trapezius - 25 units of Botox at 1 site/s.   Left Mid-Trapezius - 15 units of Botox at 1 site/s.      Right lateral Trapezius - 30 units of Botox at 1 site.  Left lateral Trapezius - 10 units of Botox at 1 site.      Right Splenius Cervicis - 30 units of Botox at 1 site/s.   Left Splenius Cervicis - 15 units of Botox at 1 site/s.      Right Levator Scapulae - 25 units of Botox at 1 site/s (shoulder muscles).   Left Levator Scapulae - 10 units of Botox at 1 site/s (shoulder muscles).      Right Longissimus Capitis - 5 units of Botox at 1 site/s.       Right Inferior Obliquus Capitis - 10 units of Botox at 1 site/s.                  Right Sternal head of the Sternocleidomastoid - 25 units of Botox at 1 site/s.       RESPONSE TO PROCEDURE:  Kendra Vaca tolerated the procedure well and there were no immediate complications.  She was allowed to recover for an appropriate period of time and was discharged home in stable condition.    FOLLOW UP:  Kendra Vaca was asked to follow up by phone in 7-14 days with Laura  YAQUELIN Lebron, PT, Care Coordinator or Anita Howell, RN, Care Coordinator, to report her response to this series of injections.  Based on the patient's previous response to this therapy, Kendra Vaca was rescheduled for the next series of injections in 10-11 weeks.    PLAN (Medication Changes, Therapy Orders, Work or Disability Issues, etc.): Will monitor response to today's injections and report.    - Increased dosage by slight amount to right side, specifically splenius and trapezius muscles.  Pt will report if increased weakness to right side occurs.    Vladimir Bowman,   PGY-2, PM&R Resident    I, Virginia Rosas, saw this patient with my resident Dr. Bowman and agree with his findings and plan of care as documented in his note.       Again, thank you for allowing me to participate in the care of your patient.      Sincerely,    Virginia Rosas MD

## 2018-08-30 NOTE — NURSING NOTE
Chief Complaint   Patient presents with     RECHECK     [facility] RETURN - BOTOX     Preventive Care:    Breast Cancer Screening: During our visit today, we discussed that it is recommended you receive breast cancer screening. Please call or make an appointment with your primary care provider to discuss this with them. You may also call the [facility] scheduling line ([phone]) to set up a mammography appointment at the Breast Center within the [facility].      Marta Mccann LPN

## 2018-08-30 NOTE — PROGRESS NOTES
"BOTULINUM TOXIN PROCEDURE NOTE    Chief Complaint   Patient presents with     RECHECK     [facility] RETURN - BOTOX     BP (!) 131/91 (BP Location: Right arm, Patient Position: Chair, Cuff Size: Adult Regular)  Pulse 77  Temp 98  F (36.7  C) (Oral)  Ht 1.6 m (5' 3\")  Wt 71.8 kg (158 lb 4.8 oz)  LMP 02/15/2012  SpO2 96%  Breastfeeding? No  BMI 28.04 kg/m2    Current Outpatient Prescriptions:      OnabotulinumtoxinA (BOTOX IJ), Inject 200 Units as directed once /C3 Exp 11/2020, Disp: , Rfl:      OnabotulinumtoxinA (BOTOX IJ), Inject 200 Units as directed once Lot # /C3 with Expiration Date:  8/2020, Disp: , Rfl:      OnabotulinumtoxinA (BOTOX IJ), Inject 200 Units into the muscle once Lot # /C3 Expiration Date:  05/2020, Disp: , Rfl:      OnabotulinumtoxinA (BOTOX IJ), Inject 200 Units into the muscle Lot # /C3  Exp: 4/2020, Disp: , Rfl:      OnabotulinumtoxinA (BOTOX IJ), Inject 200 Units as directed once Lot#: [id number] Exp: 01/2020, Disp: , Rfl:      OnabotulinumtoxinA (BOTOX IJ), Inject 200 Units as directed once Lot # /C3 with Expiration Date: 09/2019, Disp: , Rfl:      OnabotulinumtoxinA (BOTOX IJ), Inject 200 Units into the muscle once Lot #:  Exp: 08/2019, Disp: , Rfl:      OnabotulinumtoxinA (BOTOX IJ), Inject 200 Units into the muscle Lot # /C3  Exp: 5/2019, Disp: , Rfl:      OnabotulinumtoxinA (BOTOX IJ), Inject 200 Units as directed Lot # /C3 with Expiration Date:  2/2019 NDC:  [address]-47  (100 unit vial of Botox), Disp: , Rfl:      OnabotulinumtoxinA (BOTOX IJ), Inject 200 Units into the muscle Lot# /C3, Exp 09/2018, Disp: , Rfl:      OnabotulinumtoxinA (BOTOX IJ), Inject 175 Units into the muscle Lot# /C3, Exp 08/2018, Disp: , Rfl:      OnabotulinumtoxinA (BOTOX IJ), Inject 175 Units into the muscle Lot# /C3, Exp 10/2017, Disp: , Rfl:      OnabotulinumtoxinA (BOTOX IJ), Inject 175 Units as directed Lot # /C3  Exp: 8/2017, Disp: , Rfl:      " OnabotulinumtoxinA (BOTOX IJ), Inject 175 Units into the muscle Lot# /C3, Exp 04/2017, Disp: , Rfl:      OnabotulinumtoxinA (BOTOX IJ), Inject 175 Units into the muscle Lot # /C3  Exp: 1/2017, Disp: , Rfl:      penciclovir (DENAVIR) 1 % cream, Apply 1 g topically every 2 hours, Disp: , Rfl:      acetaminophen (TYLENOL) 500 MG tablet, Take 500-1,000 mg by mouth every 6 hours as needed for mild pain (can take 4 per day), Disp: , Rfl:      azelastine (OPTIVAR) 0.05 % SOLN, 1 drop, Disp: , Rfl:      epinastine HCl (ELESTAT) 0.05 % SOLN, 1 drop, Disp: , Rfl:      EPINEPHrine (EPIPEN JR) 0.15 MG/0.3ML injection, Inject 0.15 mg into the muscle as needed for anaphylaxis, Disp: , Rfl:      olopatadine (PATANOL) 0.1 % ophthalmic solution, 1 drop, Disp: , Rfl:      Allergies   Allergen Reactions     Morphine Hives     Morphine Sulfate Hives     Seafood Hives     Latex      CONTACT RASH WITH LATEX PRODUCTS IN THE HEAT OF SUMMER        PHYSICAL EXAM:  HEAD, NECK AND TRUNK PATTERN:   Erratic large amplitude yes yes and no no  Slight bias toward right torticollis  Slightly decreased ROM with head turning to left    HPI:    Patient denies new medical diagnoses, illnesses, hospitalizations, emergency room visits, and injuries since the previous injection with botulinum neurotoxin.     We reviewed the recommended safety guidelines for  Botox from any vaccine injection, such as the seasonal flu vaccine, by a minimum of 10-14 days with Kendra Vaca. She acknowledged understanding.    RESPONSE TO PREVIOUS TREATMENT:    Kendra Vaca received 200 units of 6/13/2018.  She has previously recieved 200 units of Botox on 1/16/2018.    Problems following the previous series of neurotoxin injections included:  No problems reported    BENEFITS BY PATIENT REPORT:  Pain Improvement: Yes.  Percent Improvement: 90 %  Duration of Benefit:  4 weeks and followed by a gradual reduction in benefit.      Tremors and  dystonia Improvement: Yes.  Percent Improvement: 90%  Duration of Benefit:  Lasted up until about 3 days before next Botox was due    BOTULINUM NEUROTOXIN INJECTION PROCEDURES:    VERIFICATION OF PATIENT IDENTIFICATION AND PROCEDURE     Initials   Patient Name map   Patient  map   Procedure Verified by: map     Prior to the start of the procedure and with procedural staff participation, I verbally confirmed the patient s identity using two indicators, relevant allergies, that the procedure was appropriate and matched the consent or emergent situation, and that the correct equipment/implants were available. Immediately prior to starting the procedure I conducted the Time Out with the procedural staff and re-confirmed the patient s name, procedure, and site/side. 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       Her baseline symptoms have been recalcitrant to oral medications and conservative therapy. She is here today for re-evaluation and it was decided to proceed with re-injection of Botox.       TOTAL DOSE ADMINISTERED:  Dose Administered:  200 units Botox    Diluent Used:  Preservative Free Normal Saline  Total Volume of Diluent Used:  2.0 ml  Lot # [id number] with Expiration Date:  2021  NDC #: Botox 100u ([address]-77)    Medication guide was offered to patient and was declined.    CONSENT:  The risks, benefits, and treatment options were discussed with Kendra Vaca and she agreed to proceed.       Written consent was obtained by [facility].     EQUIPMENT USED:  Needle-37mm stimulating/recording  EMG/NCS Machine      SKIN PREPARATION:  Skin preparation was performed using an alcohol wipe.      GUIDANCE DESCRIPTION:  Electro-myographic guidance was necessary throughout the procedure to accurately identify all areas of dystonic muscles while avoiding injection of non-dystonic muscles, neighboring nerves and nearby vascular structures.     AREA/MUSCLE INJECTED:  200 UNITS BOTOX = TOTAL DOSE, 1:1 DILUTION    1. HEAD & NECK MUSCLES: Total dose = 200 units Botox, 1:1 dilution   Right Mid-Trapezius - 25 units of Botox at 1 site/s.   Left Mid-Trapezius - 15 units of Botox at 1 site/s.      Right lateral Trapezius - 30 units of Botox at 1 site.  Left lateral Trapezius - 10 units of Botox at 1 site.      Right Splenius Cervicis - 30 units of Botox at 1 site/s.   Left Splenius Cervicis - 15 units of Botox at 1 site/s.      Right Levator Scapulae - 25 units of Botox at 1 site/s (shoulder muscles).   Left Levator Scapulae - 10 units of Botox at 1 site/s (shoulder muscles).      Right Longissimus Capitis - 5 units of Botox at 1 site/s.       Right Inferior Obliquus Capitis - 10 units of Botox at 1 site/s.                  Right Sternal head of the Sternocleidomastoid - 25 units of Botox at 1 site/s.       RESPONSE TO PROCEDURE:  Kendra Vaca tolerated the procedure well and there were no immediate complications.  She was allowed to recover for an appropriate period of time and was discharged home in stable condition.    FOLLOW UP:  Kendra Vaca was asked to follow up by phone in 7-14 days with Laura Lebron PT, Care Coordinator or Anita Howell RN, Care Coordinator, to report her response to this series of injections.  Based on the patient's previous response to this therapy, Kendra Vaca was rescheduled for the next series of injections in 10-11 weeks.    PLAN (Medication Changes, Therapy Orders,  Work or Disability Issues, etc.): Will monitor response to today's injections and report.    - Increased dosage by slight amount to right side, specifically splenius and trapezius muscles.  Pt will report if increased weakness to right side occurs.    Vladimir Bowman, DO  PGY-2, PM&R Resident    I, Virginia Rosas, saw this patient with my resident Dr. Bowman and agree with his findings and plan of care as documented in his note.

## 2018-08-30 NOTE — MR AVS SNAPSHOT
After Visit Summary   2018    Kendra Vaca    MRN: [id number]           Patient Information     Date Of Birth          1960        Visit Information        Provider Department      2018 1:50 PM Virginia Rosas MD [facility] Physical Medicine and Rehabilitation        Today's Diagnoses     Spasmodic torticollis    -  1    Fragments of torsion dystonia           Follow-ups after your visit        Your next 10 appointments already scheduled     Nov 15, 2018  1:50 PM CST   (Arrive by 1:35 PM)   Return Botox with Virginia Rosas MD   [facility] Physical Medicine and Rehabilitation ([facility] [place])    [address] [address] [phone]              Who to contact     Please call your clinic at [phone] to:    Ask questions about your health    Make or cancel appointments    Discuss your medicines    Learn about your test results    Speak to your doctor            Additional Information About Your Visit        MyChart Information     [employer] is an electronic gateway that provides easy, online access to your medical records. With [employer], you can request a clinic appointment, read your test results, renew a prescription or communicate with your care team.     To sign up for [employer] visit the website at [email or web address]   You will be asked to enter the access code listed below, as well as some personal information. Please follow the directions to create your username and password.     Your access code is: [email or web address]  Expires: 2018  3:15 PM     Your access code will  in 90 days. If you need help or a new code, please contact your [facility] or call [phone] for assistance.        Care EveryWhere ID     This is your Care EveryWhere ID. This could be used by other organizations to access your [facility] medical records  [id number]        Your Vitals Were      "Pulse Temperature Height Last Period Pulse Oximetry Breastfeeding?    77 98  F (36.7  C) (Oral) 1.6 m (5' 3\") 02/15/2012 96% No    BMI (Body Mass Index)                   28.04 kg/m2            Blood Pressure from Last 3 Encounters:   08/30/18 (!) 131/91   06/13/18 150/80   01/16/18 105/71    Weight from Last 3 Encounters:   08/30/18 71.8 kg (158 lb 4.8 oz)   06/13/18 74.8 kg (164 lb 14.4 oz)   01/16/18 72.9 kg (160 lb 11.2 oz)              We Performed the Following     HC CHEMODENERVATION 1 EXTREMITY, EA ADDL EXTREMITY, 1-4 MUSCLE     HC CHEMODENERVATION MUSCLE NECK UNILAT     HC CHEMODENERVATION ONE EXTREMITY, 1-4 MUSCLE     Needle EMG Guide w/Chemodenervation (59145)        Primary Care Provider Office Phone # Fax #    Dipak Leandro Dale [phone]       [place] [id number]  [facility] [address]        Equal Access to Services     [facility]: Hadii aad ku hadasho Soomaali, waaxda luqadaha, qaybta kaalmada adeegyada, waxmerrill amaya hayconcepción richardson . So [facility] [phone].    ATENCIÓN: Si habla español, tiene a rodriguez disposición servicios gratuitos de asistencia lingüística. Llame al [phone].    We comply with applicable federal civil rights laws and [place] laws. We do not discriminate on the basis of race, color, national origin, age, disability, sex, sexual orientation, or gender identity.            Thank you!     Thank you for choosing [facility] PHYSICAL MEDICINE AND REHABILITATION  for your care. Our goal is always to provide you with excellent care. Hearing back from our patients is one way we can continue to improve our services. Please take a few minutes to complete the written survey that you may receive in the mail after your visit with us. Thank you!             Your Updated Medication List - Protect others around you: Learn how to safely use, store and throw away your medicines at www.disposemymeds.org.          This list is accurate as of 8/30/18  " 3:15 PM.  Always use your most recent med list.                   Brand Name Dispense Instructions for use Diagnosis    acetaminophen 500 MG tablet    TYLENOL     Take 500-1,000 mg by mouth every 6 hours as needed for mild pain (can take 4 per day)        azelastine 0.05 % ophthalmic solution    OPTIVAR     1 drop        * BOTOX IJ      Inject 200 Units as directed once Lot # /C3 with Expiration Date: 09/2019        * BOTOX IJ      Inject 175 Units into the muscle Lot # /C3  Exp: 1/2017        * BOTOX IJ      Inject 175 Units into the muscle Lot# /C3, Exp 04/2017        * BOTOX IJ      Inject 175 Units as directed Lot # /C3  Exp: 8/2017        * BOTOX IJ      Inject 175 Units into the muscle Lot# /C3, Exp 10/2017        * BOTOX IJ      Inject 175 Units into the muscle Lot# /C3, Exp 08/2018        * BOTOX IJ      Inject 200 Units into the muscle Lot# /C3, Exp 09/2018        * BOTOX IJ      Inject 200 Units as directed Lot # /C3 with Expiration Date:? 2/2019 NDC:  [address]-75  (100 unit vial of Botox)        * BOTOX IJ      Inject 200 Units into the muscle Lot # /C3  Exp: 5/2019        * BOTOX IJ      Inject 200 Units into the muscle once Lot #:  Exp: 08/2019        * BOTOX IJ      Inject 200 Units as directed once Lot#: [id number] Exp: 01/2020        * BOTOX IJ      Inject 200 Units into the muscle Lot # /C3  Exp: 4/2020        * BOTOX IJ      Inject 200 Units into the muscle once Lot # /C3 Expiration Date:  05/2020        * BOTOX IJ      Inject 200 Units as directed once Lot # /C3 with Expiration Date:  8/2020        * BOTOX IJ      Inject 200 Units as directed once /C3 Exp 11/2020        * BOTOX IJ      Inject 200 Units into the muscle Lot: [id number] Exp: 01/2021        DENAVIR 1 % cream   Generic drug:  penciclovir      Apply 1 g topically every 2 hours        ELESTAT 0.05 % Soln   Generic drug:  epinastine HCl      1 drop        EPINEPHrine 0.15  MG/0.3ML injection 2-pack    EPIPEN JR     Inject 0.15 mg into the muscle as needed for anaphylaxis        PATANOL 0.1 % ophthalmic solution   Generic drug:  olopatadine      1 drop        * Notice:  This list has 16 medication(s) that are the same as other medications prescribed for you. Read the directions carefully, and ask your doctor or other care provider to review them with you.

## 2018-08-31 ASSESSMENT — PATIENT HEALTH QUESTIONNAIRE - PHQ9: SUM OF ALL RESPONSES TO PHQ QUESTIONS 1-9: 2

## 2018-11-13 ENCOUNTER — OFFICE VISIT (OUTPATIENT)
Dept: PHYSICAL MEDICINE AND REHAB | Facility: CLINIC | Age: 58
End: 2018-11-13
Payer: MEDICARE

## 2018-11-13 VITALS
HEART RATE: 67 BPM | TEMPERATURE: 97.8 F | DIASTOLIC BLOOD PRESSURE: 70 MMHG | SYSTOLIC BLOOD PRESSURE: 108 MMHG | OXYGEN SATURATION: 97 % | BODY MASS INDEX: 28.96 KG/M2 | WEIGHT: 163.5 LBS

## 2018-11-13 DIAGNOSIS — G24.3 SPASMODIC TORTICOLLIS: Primary | ICD-10-CM

## 2018-11-13 DIAGNOSIS — G24.1 DYSTONIA, TORSION, FRAGMENTS OF: ICD-10-CM

## 2018-11-13 ASSESSMENT — PAIN SCALES - GENERAL: PAINLEVEL: SEVERE PAIN (6)

## 2018-11-13 NOTE — NURSING NOTE
Chief Complaint   Patient presents with     RECHECK     [facility] RETURN - BOTO       Preventive Care:    Breast Cancer Screening: During our visit today, we discussed that it is recommended you receive breast cancer screening. Please call or make an appointment with your primary care provider to discuss this with them. You may also call the [facility] scheduling line ([phone]) to set up a mammography appointment at the Breast Center within the [facility].      Jalil Ferguson, EMT

## 2018-11-13 NOTE — MR AVS SNAPSHOT
After Visit Summary   11/13/2018    Kendra Vaca    MRN: [id number]           Patient Information     Date Of Birth          1960        Visit Information        Provider Department      11/13/2018 8:20 AM Virginia Rosas MD [facility] Physical Medicine and Rehabilitation        Today's Diagnoses     Spasmodic torticollis    -  1    Dystonia, torsion, fragments of           Follow-ups after your visit        Your next 10 appointments already scheduled     Jan 28, 2019 10:40 AM CST   (Arrive by 10:25 AM)   Return Botox with Virginia Rosas MD   [facility] Physical Medicine and Rehabilitation ([facility] [place])    [address] [facility] [address] [phone]              Who to contact     Please call your clinic at [phone] to:    Ask questions about your health    Make or cancel appointments    Discuss your medicines    Learn about your test results    Speak to your doctor            Additional Information About Your Visit        Care EveryWhere ID     This is your Care EveryWhere ID. This could be used by other organizations to access your [place] medical records  [id number]        Your Vitals Were     Pulse Temperature Last Period Pulse Oximetry BMI (Body Mass Index)       67 97.8  F (36.6  C) (Oral) 02/15/2012 97% 28.96 kg/m2        Blood Pressure from Last 3 Encounters:   11/13/18 108/70   08/30/18 (!) 131/91   06/13/18 150/80    Weight from Last 3 Encounters:   11/13/18 74.2 kg (163 lb 8 oz)   08/30/18 71.8 kg (158 lb 4.8 oz)   06/13/18 74.8 kg (164 lb 14.4 oz)              We Performed the Following     HC CHEMODENERVATION 1 EXTREMITY, EA ADDL EXTREMITY, 1-4 MUSCLE     HC CHEMODENERVATION MUSCLE NECK UNILAT     HC CHEMODENERVATION ONE EXTREMITY, 1-4 MUSCLE     Needle EMG Guide w/Chemodenervation (37350)        Primary Care Provider Office Phone # Fax #    Dipak Dale [phone]       [place]  [facility] [address]  [facility] [address]        Equal Access to Services     VIVIANA TEEERICKA : Hadii polo obrien hadvelma Somelisaali, wasebasda luqadaha, qaevonneta kafranklingrzegorz huntleyguilhermegrzegorz, shana amaya carolinemichelet damicojacklynlatrice lynn. So [facility] [phone].    ATENCIÓN: Si habla español, tiene a rodriguez disposición servicios gratuitos de asistencia lingüística. Llame al [phone].    We comply with applicable federal civil rights laws and [place] laws. We do not discriminate on the basis of race, color, national origin, age, disability, sex, sexual orientation, or gender identity.            Thank you!     Thank you for choosing [facility] PHYSICAL MEDICINE AND REHABILITATION  for your care. Our goal is always to provide you with excellent care. Hearing back from our patients is one way we can continue to improve our services. Please take a few minutes to complete the written survey that you may receive in the mail after your visit with us. Thank you!             Your Updated Medication List - Protect others around you: Learn how to safely use, store and throw away your medicines at www.disposemymeds.org.          This list is accurate as of 11/13/18  9:16 AM.  Always use your most recent med list.                   Brand Name Dispense Instructions for use Diagnosis    acetaminophen 500 MG tablet    TYLENOL     Take 500-1,000 mg by mouth every 6 hours as needed for mild pain (can take 4 per day)        azelastine 0.05 % ophthalmic solution    OPTIVAR     Place 1 drop into both eyes as needed        * BOTOX IJ      Inject 200 Units as directed once Lot # /C3 with Expiration Date: 09/2019        * BOTOX IJ      Inject 175 Units into the muscle Lot # /C3  Exp: 1/2017        * BOTOX IJ      Inject 175 Units into the muscle Lot# /C3, Exp 04/2017        * BOTOX IJ      Inject 175 Units as directed Lot # /C3  Exp: 8/2017        * BOTOX IJ      Inject 175 Units into the muscle Lot# /C3, Exp 10/2017        * BOTOX  IJ      Inject 175 Units into the muscle Lot# /C3, Exp 08/2018        * BOTOX IJ      Inject 200 Units into the muscle Lot# /C3, Exp 09/2018        * BOTOX IJ      Inject 200 Units as directed Lot # /C3 with Expiration Date:? 2/2019 NDC:  [address]-36  (100 unit vial of Botox)        * BOTOX IJ      Inject 200 Units into the muscle Lot # /C3  Exp: 5/2019        * BOTOX IJ      Inject 200 Units into the muscle once Lot #:  Exp: 08/2019        * BOTOX IJ      Inject 200 Units as directed once Lot#: [id number] Exp: 01/2020        * BOTOX IJ      Inject 200 Units into the muscle Lot # /C3  Exp: 4/2020        * BOTOX IJ      Inject 200 Units into the muscle once Lot # /C3 Expiration Date:  05/2020        * BOTOX IJ      Inject 200 Units as directed once Lot # /C3 with Expiration Date:  8/2020        * BOTOX IJ      Inject 200 Units as directed once /C3 Exp 11/2020        * BOTOX IJ      Inject 200 Units into the muscle Lot: [id number] Exp: 01/2021        * BOTOX IJ      Inject 200 Units as directed once Lot # /C3 with Expiration Date:  05/2021        DENAVIR 1 % cream   Generic drug:  penciclovir      Apply 1 g topically every 2 hours        ELESTAT 0.05 % Soln   Generic drug:  epinastine HCl      1 drop        EPINEPHrine 0.15 MG/0.3ML injection 2-pack    EPIPEN JR     Inject 0.15 mg into the muscle as needed for anaphylaxis        PATANOL 0.1 % ophthalmic solution   Generic drug:  olopatadine      1 drop        * Notice:  This list has 17 medication(s) that are the same as other medications prescribed for you. Read the directions carefully, and ask your doctor or other care provider to review them with you.

## 2018-11-13 NOTE — PROGRESS NOTES
BOTULINUM TOXIN PROCEDURE NOTE    Chief Complaint   Patient presents with     Dystonia     [facility] RETURN - BOTOX Spasmodic Torticollis     /70 (BP Location: Left arm, Patient Position: Sitting, Cuff Size: Adult Regular)  Pulse 67  Temp 97.8  F (36.6  C) (Oral)  Wt 74.2 kg (163 lb 8 oz)  LMP 02/15/2012  SpO2 97%  BMI 28.96 kg/m2    Current Outpatient Prescriptions:      acetaminophen (TYLENOL) 500 MG tablet, Take 500-1,000 mg by mouth every 6 hours as needed for mild pain (can take 4 per day), Disp: , Rfl:      azelastine (OPTIVAR) 0.05 % SOLN, Place 1 drop into both eyes as needed , Disp: , Rfl:      EPINEPHrine (EPIPEN JR) 0.15 MG/0.3ML injection, Inject 0.15 mg into the muscle as needed for anaphylaxis, Disp: , Rfl:      OnabotulinumtoxinA (BOTOX IJ), Inject 200 Units into the muscle Lot: [id number] Exp: 01/2021, Disp: , Rfl:      OnabotulinumtoxinA (BOTOX IJ), Inject 200 Units as directed once /C3 Exp 11/2020, Disp: , Rfl:      OnabotulinumtoxinA (BOTOX IJ), Inject 200 Units as directed once Lot # /C3 with Expiration Date:  8/2020, Disp: , Rfl:      OnabotulinumtoxinA (BOTOX IJ), Inject 200 Units into the muscle once Lot # /C3 Expiration Date:  05/2020, Disp: , Rfl:      OnabotulinumtoxinA (BOTOX IJ), Inject 200 Units into the muscle Lot # /C3  Exp: 4/2020, Disp: , Rfl:      OnabotulinumtoxinA (BOTOX IJ), Inject 200 Units as directed once Lot#: [id number] Exp: 01/2020, Disp: , Rfl:      OnabotulinumtoxinA (BOTOX IJ), Inject 200 Units as directed once Lot # /C3 with Expiration Date: 09/2019, Disp: , Rfl:      OnabotulinumtoxinA (BOTOX IJ), Inject 200 Units into the muscle once Lot #:  Exp: 08/2019, Disp: , Rfl:      OnabotulinumtoxinA (BOTOX IJ), Inject 200 Units into the muscle Lot # /C3  Exp: 5/2019, Disp: , Rfl:      OnabotulinumtoxinA (BOTOX IJ), Inject 200 Units as directed Lot # /C3 with Expiration Date:  2/2019 NDC:  [address]-81  (100 unit vial of Botox), Disp: ,  Rfl:      OnabotulinumtoxinA (BOTOX IJ), Inject 200 Units into the muscle Lot# /C3, Exp 09/2018, Disp: , Rfl:      OnabotulinumtoxinA (BOTOX IJ), Inject 175 Units into the muscle Lot# /C3, Exp 08/2018, Disp: , Rfl:      OnabotulinumtoxinA (BOTOX IJ), Inject 175 Units into the muscle Lot# /C3, Exp 10/2017, Disp: , Rfl:      OnabotulinumtoxinA (BOTOX IJ), Inject 175 Units as directed Lot # /C3  Exp: 8/2017, Disp: , Rfl:      OnabotulinumtoxinA (BOTOX IJ), Inject 175 Units into the muscle Lot# /C3, Exp 04/2017, Disp: , Rfl:      OnabotulinumtoxinA (BOTOX IJ), Inject 175 Units into the muscle Lot # /C3  Exp: 1/2017, Disp: , Rfl:      penciclovir (DENAVIR) 1 % cream, Apply 1 g topically every 2 hours, Disp: , Rfl:      epinastine HCl (ELESTAT) 0.05 % SOLN, 1 drop, Disp: , Rfl:      olopatadine (PATANOL) 0.1 % ophthalmic solution, 1 drop, Disp: , Rfl:      Allergies   Allergen Reactions     Morphine Hives     Morphine Sulfate Hives     Seafood Hives     Latex      CONTACT RASH WITH LATEX PRODUCTS IN THE HEAT OF SUMMER        PHYSICAL EXAM:  HEAD, NECK AND TRUNK PATTERN:   Continuous multidirectional tremor  Right rotation  Left side bending  Slightly decreased ROM with head turning to left    HPI:    Patient denies new medical diagnoses, illnesses, hospitalizations, emergency room visits, and injuries since the previous injection with botulinum neurotoxin.     We reviewed the recommended safety guidelines for  Botox from any vaccine injection, such as the seasonal flu vaccine, by a minimum of 10-14 days with Kendra Vaca. She acknowledged understanding.    RESPONSE TO PREVIOUS TREATMENT:    Kendra Vaca received 200 units of 8/30/18.     Problems following the previous series of neurotoxin injections included:  No problems reported    BENEFITS BY PATIENT REPORT:  Pain Improvement: Yes.  Percent Improvement: 90 %  Duration of Benefit: 9-10 weeks followed by a gradual  reduction in benefit.      Tremors and dystonia Improvement: Yes.  Percent Improvement: 90%  Duration of Benefit: 9-10 weeks followed by a gradual reduction in benefit. Noticeable tremors and pulling noted in the last 3 days.     BOTULINUM NEUROTOXIN INJECTION PROCEDURES:    VERIFICATION OF PATIENT IDENTIFICATION AND PROCEDURE     Initials   Patient Name ses   Patient  ses   Procedure Verified by: ses     Prior to the start of the procedure and with procedural staff participation, I verbally confirmed the patient s identity using two indicators, relevant allergies, that the procedure was appropriate and matched the consent or emergent situation, and that the correct equipment/implants were available. Immediately prior to starting the procedure I conducted the Time Out with the procedural staff and re-confirmed the patient s name, procedure, and site/side. (The Joint Commission universal protocol was followed.)  Yes    Sedation (Moderate or Deep): None      Above assessments performed by:    Virginia Rosas MD      INDICATION/S FOR PROCEDURE/S:  Kendra Vaca is a 58-year-old patient with dystonia affecting the head, neck and shoulder girdle musculature secondary to a diagnosis of cervical and segmental dystonia with associated pain, tremor, loss of joint motion, loss of volitional motor control and difficulty with activities of daily living.       Her baseline symptoms have been recalcitrant to oral medications and conservative therapy. She is here today for re-evaluation and it was decided to proceed with re-injection of Botox.       TOTAL DOSE ADMINISTERED:  Dose Administered:  200 units Botox    Diluent Used:  Preservative Free Normal Saline  Total Volume of Diluent Used:  2 ml  Lot # /C3 with Expiration Date:  2021  NDC #: Botox 100u ([address]-08)    Medication guide was offered to patient and was declined.    CONSENT:  The risks, benefits, and treatment options were discussed with Kendra MARLEY  Nola and she agreed to proceed.      Written consent was obtained by [facility].     EQUIPMENT USED:  Needle-37mm stimulating/recording  EMG/NCS Machine      SKIN PREPARATION:  Skin preparation was performed using an alcohol wipe.  Ethyl Chloride spray was used at injection sites for pain control.       GUIDANCE DESCRIPTION:  Electro-myographic guidance was necessary throughout the procedure to accurately identify all areas of dystonic muscles while avoiding injection of non-dystonic muscles, neighboring nerves and nearby vascular structures.     AREA/MUSCLE INJECTED:  200 UNITS BOTOX = TOTAL DOSE, 1:1 DILUTION    1. HEAD & NECK MUSCLES: Total dose = 200 units Botox, 1:1 dilution   Right Mid-Trapezius - 25 units of Botox at 1 site/s.   Left Mid-Trapezius - 15 units of Botox at 1 site/s.      Right lateral Trapezius - 30 units of Botox at 1 site.  Left lateral Trapezius - 10 units of Botox at 1 site.      Right Splenius Cervicis - 30 units of Botox at 1 site/s.   Left Splenius Cervicis - 15 units of Botox at 1 site/s.      Right Levator Scapulae - 25 units of Botox at 1 site/s (shoulder muscles).   Left Levator Scapulae - 10 units of Botox at 1 site/s (shoulder muscles).      Right Longissimus Capitis - 5 units of Botox at 1 site/s.       Right Inferior Obliquus Capitis - 5 units of Botox at 1 site/s.                  Right Sternocleidomastoid - 20 units of Botox at 1 site/s.    Left Sternocleidomastoid - 10 units of Botox at 1 site/s.     RESPONSE TO PROCEDURE:  Kendra Vaca tolerated the procedure well and there were no immediate complications.  She was allowed to recover for an appropriate period of time and was discharged home in stable condition.    FOLLOW UP:  Kendra Vaca was asked to follow up by phone in 7-14 days with Laura Lebron PT, Care Coordinator or Anita Howell RN, Care Coordinator, to report her response to this series of injections.  Based on the patient's previous response to  this therapy, Kendra DONELL De Leónbartolo was rescheduled for the next series of injections in 10-11 weeks.    PLAN (Medication Changes, Therapy Orders, Work or Disability Issues, etc.): Will monitor response to today's injections and report.

## 2018-11-13 NOTE — LETTER
11/13/2018       RE: Kendra Vaca  [address] [place] [address]     Dear Colleague,    Thank you for referring your patient, Kendra Vaca, to the [facility] at [facility]. Please see a copy of my visit note below.    BOTULINUM TOXIN PROCEDURE NOTE    Chief Complaint   Patient presents with     Dystonia     [facility] RETURN - BOTOX Spasmodic Torticollis     /70 (BP Location: Left arm, Patient Position: Sitting, Cuff Size: Adult Regular)  Pulse 67  Temp 97.8  F (36.6  C) (Oral)  Wt 74.2 kg (163 lb 8 oz)  LMP 02/15/2012  SpO2 97%  BMI 28.96 kg/m2    Current Outpatient Prescriptions:      acetaminophen (TYLENOL) 500 MG tablet, Take 500-1,000 mg by mouth every 6 hours as needed for mild pain (can take 4 per day), Disp: , Rfl:      azelastine (OPTIVAR) 0.05 % SOLN, Place 1 drop into both eyes as needed , Disp: , Rfl:      EPINEPHrine (EPIPEN JR) 0.15 MG/0.3ML injection, Inject 0.15 mg into the muscle as needed for anaphylaxis, Disp: , Rfl:      OnabotulinumtoxinA (BOTOX IJ), Inject 200 Units into the muscle Lot: [id number] Exp: 01/2021, Disp: , Rfl:      OnabotulinumtoxinA (BOTOX IJ), Inject 200 Units as directed once /C3 Exp 11/2020, Disp: , Rfl:      OnabotulinumtoxinA (BOTOX IJ), Inject 200 Units as directed once Lot # /C3 with Expiration Date:  8/2020, Disp: , Rfl:      OnabotulinumtoxinA (BOTOX IJ), Inject 200 Units into the muscle once Lot # /C3 Expiration Date:  05/2020, Disp: , Rfl:      OnabotulinumtoxinA (BOTOX IJ), Inject 200 Units into the muscle Lot # /C3  Exp: 4/2020, Disp: , Rfl:      OnabotulinumtoxinA (BOTOX IJ), Inject 200 Units as directed once Lot#: [id number] Exp: 01/2020, Disp: , Rfl:      OnabotulinumtoxinA (BOTOX IJ), Inject 200 Units as directed once Lot # /C3 with Expiration Date: 09/2019, Disp: , Rfl:      OnabotulinumtoxinA (BOTOX IJ), Inject 200 Units into the muscle once  Lot #:  Exp: 08/2019, Disp: , Rfl:      OnabotulinumtoxinA (BOTOX IJ), Inject 200 Units into the muscle Lot # /C3  Exp: 5/2019, Disp: , Rfl:      OnabotulinumtoxinA (BOTOX IJ), Inject 200 Units as directed Lot # /C3 with Expiration Date:  2/2019 NDC:  [address]-58  (100 unit vial of Botox), Disp: , Rfl:      OnabotulinumtoxinA (BOTOX IJ), Inject 200 Units into the muscle Lot# /C3, Exp 09/2018, Disp: , Rfl:      OnabotulinumtoxinA (BOTOX IJ), Inject 175 Units into the muscle Lot# /C3, Exp 08/2018, Disp: , Rfl:      OnabotulinumtoxinA (BOTOX IJ), Inject 175 Units into the muscle Lot# /C3, Exp 10/2017, Disp: , Rfl:      OnabotulinumtoxinA (BOTOX IJ), Inject 175 Units as directed Lot # /C3  Exp: 8/2017, Disp: , Rfl:      OnabotulinumtoxinA (BOTOX IJ), Inject 175 Units into the muscle Lot# /C3, Exp 04/2017, Disp: , Rfl:      OnabotulinumtoxinA (BOTOX IJ), Inject 175 Units into the muscle Lot # /C3  Exp: 1/2017, Disp: , Rfl:      penciclovir (DENAVIR) 1 % cream, Apply 1 g topically every 2 hours, Disp: , Rfl:      epinastine HCl (ELESTAT) 0.05 % SOLN, 1 drop, Disp: , Rfl:      olopatadine (PATANOL) 0.1 % ophthalmic solution, 1 drop, Disp: , Rfl:      Allergies   Allergen Reactions     Morphine Hives     Morphine Sulfate Hives     Seafood Hives     Latex      CONTACT RASH WITH LATEX PRODUCTS IN THE HEAT OF SUMMER        PHYSICAL EXAM:  HEAD, NECK AND TRUNK PATTERN:   Continuous multidirectional tremor  Right rotation  Left side bending  Slightly decreased ROM with head turning to left    HPI:    Patient denies new medical diagnoses, illnesses, hospitalizations, emergency room visits, and injuries since the previous injection with botulinum neurotoxin.     We reviewed the recommended safety guidelines for  Botox from any vaccine injection, such as the seasonal flu vaccine, by a minimum of 10-14 days with Kendra Vaca. She acknowledged understanding.    RESPONSE  TO PREVIOUS TREATMENT:    Kendra Vaca received 200 units of 18.     Problems following the previous series of neurotoxin injections included:  No problems reported    BENEFITS BY PATIENT REPORT:  Pain Improvement: Yes.  Percent Improvement: 90 %  Duration of Benefit: 9-10 weeks followed by a gradual reduction in benefit.      Tremors and dystonia Improvement: Yes.  Percent Improvement: 90%  Duration of Benefit: 9-10 weeks followed by a gradual reduction in benefit. Noticeable tremors and pulling noted in the last 3 days.     BOTULINUM NEUROTOXIN INJECTION PROCEDURES:    VERIFICATION OF PATIENT IDENTIFICATION AND PROCEDURE     Initials   Patient Name ses   Patient  ses   Procedure Verified by: ses     Prior to the start of the procedure and with procedural staff participation, I verbally confirmed the patient s identity using two indicators, relevant allergies, that the procedure was appropriate and matched the consent or emergent situation, and that the correct equipment/implants were available. Immediately prior to starting the procedure I conducted the Time Out with the procedural staff and re-confirmed the patient s name, procedure, and site/side. (The Joint Commission universal protocol was followed.)  Yes    Sedation (Moderate or Deep): None      Above assessments performed by:    Virginia Rosas MD      INDICATION/S FOR PROCEDURE/S:  Kendra Vaca is a 58-year-old patient with dystonia affecting the head, neck and shoulder girdle musculature secondary to a diagnosis of cervical and segmental dystonia with associated pain, tremor, loss of joint motion, loss of volitional motor control and difficulty with activities of daily living.       Her baseline symptoms have been recalcitrant to oral medications and conservative therapy. She is here today for re-evaluation and it was decided to proceed with re-injection of Botox.       TOTAL DOSE ADMINISTERED:  Dose Administered:  200 units Botox     Diluent Used:  Preservative Free Normal Saline  Total Volume of Diluent Used:  2 ml  Lot # /C3 with Expiration Date:  05/2021  NDC #: Botox 100u ([address]-11)    Medication guide was offered to patient and was declined.    CONSENT:  The risks, benefits, and treatment options were discussed with Kendra Vaca and she agreed to proceed.      Written consent was obtained by [facility].     EQUIPMENT USED:  Needle-37mm stimulating/recording  EMG/NCS Machine      SKIN PREPARATION:  Skin preparation was performed using an alcohol wipe.  Ethyl Chloride spray was used at injection sites for pain control.       GUIDANCE DESCRIPTION:  Electro-myographic guidance was necessary throughout the procedure to accurately identify all areas of dystonic muscles while avoiding injection of non-dystonic muscles, neighboring nerves and nearby vascular structures.     AREA/MUSCLE INJECTED:  200 UNITS BOTOX = TOTAL DOSE, 1:1 DILUTION    1. HEAD & NECK MUSCLES: Total dose = 200 units Botox, 1:1 dilution   Right Mid-Trapezius - 25 units of Botox at 1 site/s.   Left Mid-Trapezius - 15 units of Botox at 1 site/s.      Right lateral Trapezius - 30 units of Botox at 1 site.  Left lateral Trapezius - 10 units of Botox at 1 site.      Right Splenius Cervicis - 30 units of Botox at 1 site/s.   Left Splenius Cervicis - 15 units of Botox at 1 site/s.      Right Levator Scapulae - 25 units of Botox at 1 site/s (shoulder muscles).   Left Levator Scapulae - 10 units of Botox at 1 site/s (shoulder muscles).      Right Longissimus Capitis - 5 units of Botox at 1 site/s.       Right Inferior Obliquus Capitis - 5 units of Botox at 1 site/s.                  Right Sternocleidomastoid - 20 units of Botox at 1 site/s.    Left Sternocleidomastoid - 10 units of Botox at 1 site/s.     RESPONSE TO PROCEDURE:  Kendra Vaca tolerated the procedure well and there were no immediate complications.  She was allowed to recover for an appropriate period of  time and was discharged home in stable condition.    FOLLOW UP:  Kendra Vaca was asked to follow up by phone in 7-14 days with Laura Lebron PT, Care Coordinator or Anita Howell RN, Care Coordinator, to report her response to this series of injections.  Based on the patient's previous response to this therapy, Kendra Vaca was rescheduled for the next series of injections in 10-11 weeks.    PLAN (Medication Changes, Therapy Orders, Work or Disability Issues, etc.): Will monitor response to today's injections and report.      Again, thank you for allowing me to participate in the care of your patient.      Sincerely,    Virginia Rosas MD

## 2019-01-16 ENCOUNTER — PATIENT OUTREACH (OUTPATIENT)
Dept: CARE COORDINATION | Facility: CLINIC | Age: 59
End: 2019-01-16

## 2019-01-25 ENCOUNTER — TELEPHONE (OUTPATIENT)
Dept: PHYSICAL MEDICINE AND REHAB | Facility: CLINIC | Age: 59
End: 2019-01-25

## 2019-01-25 NOTE — TELEPHONE ENCOUNTER
[facility] Call Center    Phone Message    May a detailed message be left on voicemail: yes    Reason for Call: Other: Pt needs to reschedule her appt. Please give her a call back.      Action Taken: Message routed to:  [facility] ([facility]): PM&R

## 2019-02-25 ENCOUNTER — OFFICE VISIT (OUTPATIENT)
Dept: PHYSICAL MEDICINE AND REHAB | Facility: CLINIC | Age: 59
End: 2019-02-25
Payer: MEDICARE

## 2019-02-25 VITALS
BODY MASS INDEX: 30.79 KG/M2 | WEIGHT: 173.8 LBS | HEART RATE: 75 BPM | DIASTOLIC BLOOD PRESSURE: 85 MMHG | HEIGHT: 63 IN | TEMPERATURE: 97.4 F | SYSTOLIC BLOOD PRESSURE: 137 MMHG

## 2019-02-25 DIAGNOSIS — G24.1 GENETIC TORSION DYSTONIA: ICD-10-CM

## 2019-02-25 DIAGNOSIS — G24.3 SPASMODIC TORTICOLLIS: Primary | ICD-10-CM

## 2019-02-25 ASSESSMENT — PAIN SCALES - GENERAL: PAINLEVEL: NO PAIN (0)

## 2019-02-25 ASSESSMENT — MIFFLIN-ST. JEOR: SCORE: 1337.48

## 2019-02-25 NOTE — NURSING NOTE
Composite Cancer Quality Initiative    Colon Cancer Initiative satisfied?  Yes     Breast Cancer Initiative satisfied?  No, Plan to scheduled in the near future      Cervical Cancer Initiative satisfied?  No, Plan to scheduled in the near future      Updated: February 25, 2019 by Zulema Malcolm MA

## 2019-02-25 NOTE — NURSING NOTE
Chief Complaint   Patient presents with     RECHECK     [facility] BOTOX INJECTION     Zulema Malcolm MA

## 2019-02-25 NOTE — PROGRESS NOTES
"BOTULINUM TOXIN PROCEDURE NOTE    Chief Complaint   Patient presents with     RECHECK     [facility] BOTOX INJECTION     Ht 1.6 m (5' 3\")   Wt 78.8 kg (173 lb 12.8 oz)   LMP 02/15/2012   BMI 30.79 kg/m      Current Outpatient Medications:      acetaminophen (TYLENOL) 500 MG tablet, Take 500-1,000 mg by mouth every 6 hours as needed for mild pain (can take 4 per day), Disp: , Rfl:      azelastine (OPTIVAR) 0.05 % SOLN, Place 1 drop into both eyes as needed , Disp: , Rfl:      epinastine HCl (ELESTAT) 0.05 % SOLN, 1 drop, Disp: , Rfl:      EPINEPHrine (EPIPEN JR) 0.15 MG/0.3ML injection, Inject 0.15 mg into the muscle as needed for anaphylaxis, Disp: , Rfl:      olopatadine (PATANOL) 0.1 % ophthalmic solution, 1 drop, Disp: , Rfl:      OnabotulinumtoxinA (BOTOX IJ), Inject 200 Units into the muscle Lot: [id number] Exp: 01/2021, Disp: , Rfl:      OnabotulinumtoxinA (BOTOX IJ), Inject 200 Units as directed once /C3 Exp 11/2020, Disp: , Rfl:      OnabotulinumtoxinA (BOTOX IJ), Inject 200 Units as directed once Lot # /C3 with Expiration Date:  8/2020, Disp: , Rfl:      OnabotulinumtoxinA (BOTOX IJ), Inject 200 Units into the muscle once Lot # /C3 Expiration Date:  05/2020, Disp: , Rfl:      OnabotulinumtoxinA (BOTOX IJ), Inject 200 Units into the muscle Lot # /C3  Exp: 4/2020, Disp: , Rfl:      OnabotulinumtoxinA (BOTOX IJ), Inject 200 Units as directed once Lot#: [id number] Exp: 01/2020, Disp: , Rfl:      OnabotulinumtoxinA (BOTOX IJ), Inject 200 Units as directed once Lot # /C3 with Expiration Date: 09/2019, Disp: , Rfl:      OnabotulinumtoxinA (BOTOX IJ), Inject 200 Units into the muscle once Lot #:  Exp: 08/2019, Disp: , Rfl:      OnabotulinumtoxinA (BOTOX IJ), Inject 200 Units into the muscle Lot # /C3  Exp: 5/2019, Disp: , Rfl:      OnabotulinumtoxinA (BOTOX IJ), Inject 200 Units as directed Lot # /C3 with Expiration Date:  2/2019 NDC:  [address]-07  (100 unit vial of Botox), Disp: , " Rfl:      OnabotulinumtoxinA (BOTOX IJ), Inject 200 Units into the muscle Lot# /C3, Exp 09/2018, Disp: , Rfl:      OnabotulinumtoxinA (BOTOX IJ), Inject 175 Units into the muscle Lot# /C3, Exp 08/2018, Disp: , Rfl:      OnabotulinumtoxinA (BOTOX IJ), Inject 175 Units into the muscle Lot# /C3, Exp 10/2017, Disp: , Rfl:      OnabotulinumtoxinA (BOTOX IJ), Inject 175 Units as directed Lot # /C3  Exp: 8/2017, Disp: , Rfl:      OnabotulinumtoxinA (BOTOX IJ), Inject 175 Units into the muscle Lot# /C3, Exp 04/2017, Disp: , Rfl:      OnabotulinumtoxinA (BOTOX IJ), Inject 175 Units into the muscle Lot # /C3  Exp: 1/2017, Disp: , Rfl:      penciclovir (DENAVIR) 1 % cream, Apply 1 g topically every 2 hours, Disp: , Rfl:      Allergies   Allergen Reactions     Morphine Hives     Morphine Sulfate Hives     Seafood Hives     Latex      CONTACT RASH WITH LATEX PRODUCTS IN THE HEAT OF SUMMER        PHYSICAL EXAM:  HEAD, NECK AND TRUNK PATTERN:   Continuous multidirectional tremor  Right rotation  Left side bending  Slightly decreased ROM with head turning to left    HPI:    Patient denies new medical diagnoses, illnesses, hospitalizations, emergency room visits, and injuries since the previous injection with botulinum neurotoxin.     We reviewed the recommended safety guidelines for  Botox from any vaccine injection, such as the seasonal flu vaccine, by a minimum of 10-14 days with Kendra Vaca. She acknowledged understanding.    RESPONSE TO PREVIOUS TREATMENT:    Kendra Vaca received 200 units of 11/13/18.     Problems following the previous series of neurotoxin injections included:  No problems reported    BENEFITS BY PATIENT REPORT:  Pain Improvement: Yes.  Percent Improvement: 90 %  Duration of Benefit: 10 weeks followed by a gradual reduction in benefit.      Tremors and dystonia Improvement: Yes.  Percent Improvement: 90% Duration of Benefit: 10 weeks followed by a  gradual reduction in benefit. Noticeable tremors and pulling noted approximately 4 weeks ago. Kendra is 3 weeks later than her usual timing due to weather, and this is why she has been symptomatic for longer.     BOTULINUM NEUROTOXIN INJECTION PROCEDURES:    VERIFICATION OF PATIENT IDENTIFICATION AND PROCEDURE     Initials   Patient Name ses   Patient  ses   Procedure Verified by: ses     Prior to the start of the procedure and with procedural staff participation, I verbally confirmed the patient s identity using two indicators, relevant allergies, that the procedure was appropriate and matched the consent or emergent situation, and that the correct equipment/implants were available. Immediately prior to starting the procedure I conducted the Time Out with the procedural staff and re-confirmed the patient s name, procedure, and site/side. (The Joint Commission universal protocol was followed.)  Yes    Sedation (Moderate or Deep): None      Above assessments performed by:    Virginia Rosas MD      INDICATION/S FOR PROCEDURE/S:  Kendra Vaca is a 58-year-old patient with dystonia affecting the head, neck and shoulder girdle musculature secondary to a diagnosis of cervical and segmental dystonia with associated pain, tremor, loss of joint motion, loss of volitional motor control and difficulty with activities of daily living.       Her baseline symptoms have been recalcitrant to oral medications and conservative therapy. She is here today for re-evaluation and it was decided to proceed with re-injection of Botox.       TOTAL DOSE ADMINISTERED:  Dose Administered:  200 units Botox    Diluent Used:  Preservative Free Normal Saline  Total Volume of Diluent Used:  2 ml  Lot # /C3 with Expiration Date:  2021  NDC #: Botox 100u ([address]-64)    Medication guide was offered to patient and was declined.    CONSENT:  The risks, benefits, and treatment options were discussed with Kendra Vaca and  she agreed to proceed.      Written consent was obtained by [facility].     EQUIPMENT USED:  Needle-37mm stimulating/recording  EMG/NCS Machine      SKIN PREPARATION:  Skin preparation was performed using an alcohol wipe.  Ethyl Chloride spray was used at injection sites for pain control.       GUIDANCE DESCRIPTION:  Electro-myographic guidance was necessary throughout the procedure to accurately identify all areas of dystonic muscles while avoiding injection of non-dystonic muscles, neighboring nerves and nearby vascular structures.     AREA/MUSCLE INJECTED:  200 UNITS BOTOX = TOTAL DOSE, 1:1 DILUTION    1. HEAD & NECK MUSCLES: Total dose = 200 units Botox, 1:1 dilution   Right Mid-Trapezius - 25 units of Botox at 1 site/s.   Left Mid-Trapezius - 15 units of Botox at 1 site/s.      Right lateral Trapezius - 30 units of Botox at 1 site.  Left lateral Trapezius - 10 units of Botox at 1 site.      Right Splenius Cervicis - 30 units of Botox at 1 site/s.   Left Splenius Cervicis - 15 units of Botox at 1 site/s.      Right Levator Scapulae - 25 units of Botox at 1 site/s (shoulder muscles).   Left Levator Scapulae - 10 units of Botox at 1 site/s (shoulder muscles).      Right Longissimus Capitis - 5 units of Botox at 1 site/s.       Right Inferior Obliquus Capitis - 5 units of Botox at 1 site/s.                  Right Sternocleidomastoid - 20 units of Botox at 1 site/s.    Left Sternocleidomastoid - 10 units of Botox at 1 site/s.       RESPONSE TO PROCEDURE:  Kendra Vaca tolerated the procedure well and there were no immediate complications.  She was allowed to recover for an appropriate period of time and was discharged home in stable condition.    FOLLOW UP:  Kendra Vaca was asked to follow up by phone in 7-14 days with Laura Lebron PT, Care Coordinator or Anita Howell RN, Care Coordinator, to report her response to this series of injections.  Based on the patient's previous response to this  therapy, Kendra Vaca was rescheduled for the next series of injections in 12 weeks.    PLAN (Medication Changes, Therapy Orders, Work or Disability Issues, etc.): Will monitor response to today's injections and report.

## 2019-02-25 NOTE — LETTER
"2/25/2019       RE: Kendra Vaca  [address] [place] [address]     Dear Colleague,    Thank you for referring your patient, Kendra Vaca, to the [facility] PHYSICAL MEDICINE AND REHABILITATION at [facility]. Please see a copy of my visit note below.    BOTULINUM TOXIN PROCEDURE NOTE    Chief Complaint   Patient presents with     RECHECK     [facility] BOTOX INJECTION     Ht 1.6 m (5' 3\")   Wt 78.8 kg (173 lb 12.8 oz)   LMP 02/15/2012   BMI 30.79 kg/m       Current Outpatient Medications:      acetaminophen (TYLENOL) 500 MG tablet, Take 500-1,000 mg by mouth every 6 hours as needed for mild pain (can take 4 per day), Disp: , Rfl:      azelastine (OPTIVAR) 0.05 % SOLN, Place 1 drop into both eyes as needed , Disp: , Rfl:      epinastine HCl (ELESTAT) 0.05 % SOLN, 1 drop, Disp: , Rfl:      EPINEPHrine (EPIPEN JR) 0.15 MG/0.3ML injection, Inject 0.15 mg into the muscle as needed for anaphylaxis, Disp: , Rfl:      olopatadine (PATANOL) 0.1 % ophthalmic solution, 1 drop, Disp: , Rfl:      OnabotulinumtoxinA (BOTOX IJ), Inject 200 Units into the muscle Lot: [id number] Exp: 01/2021, Disp: , Rfl:      OnabotulinumtoxinA (BOTOX IJ), Inject 200 Units as directed once /C3 Exp 11/2020, Disp: , Rfl:      OnabotulinumtoxinA (BOTOX IJ), Inject 200 Units as directed once Lot # /C3 with Expiration Date:  8/2020, Disp: , Rfl:      OnabotulinumtoxinA (BOTOX IJ), Inject 200 Units into the muscle once Lot # /C3 Expiration Date:  05/2020, Disp: , Rfl:      OnabotulinumtoxinA (BOTOX IJ), Inject 200 Units into the muscle Lot # /C3  Exp: 4/2020, Disp: , Rfl:      OnabotulinumtoxinA (BOTOX IJ), Inject 200 Units as directed once Lot#: [id number] Exp: 01/2020, Disp: , Rfl:      OnabotulinumtoxinA (BOTOX IJ), Inject 200 Units as directed once Lot # /C3 with Expiration Date: 09/2019, Disp: , Rfl:      OnabotulinumtoxinA (BOTOX IJ), Inject 200 Units into the muscle once Lot " #:  Exp: 08/2019, Disp: , Rfl:      OnabotulinumtoxinA (BOTOX IJ), Inject 200 Units into the muscle Lot # /C3  Exp: 5/2019, Disp: , Rfl:      OnabotulinumtoxinA (BOTOX IJ), Inject 200 Units as directed Lot # /C3 with Expiration Date:  2/2019 NDC:  [address]-62  (100 unit vial of Botox), Disp: , Rfl:      OnabotulinumtoxinA (BOTOX IJ), Inject 200 Units into the muscle Lot# /C3, Exp 09/2018, Disp: , Rfl:      OnabotulinumtoxinA (BOTOX IJ), Inject 175 Units into the muscle Lot# /C3, Exp 08/2018, Disp: , Rfl:      OnabotulinumtoxinA (BOTOX IJ), Inject 175 Units into the muscle Lot# /C3, Exp 10/2017, Disp: , Rfl:      OnabotulinumtoxinA (BOTOX IJ), Inject 175 Units as directed Lot # /C3  Exp: 8/2017, Disp: , Rfl:      OnabotulinumtoxinA (BOTOX IJ), Inject 175 Units into the muscle Lot# /C3, Exp 04/2017, Disp: , Rfl:      OnabotulinumtoxinA (BOTOX IJ), Inject 175 Units into the muscle Lot # /C3  Exp: 1/2017, Disp: , Rfl:      penciclovir (DENAVIR) 1 % cream, Apply 1 g topically every 2 hours, Disp: , Rfl:      Allergies   Allergen Reactions     Morphine Hives     Morphine Sulfate Hives     Seafood Hives     Latex      CONTACT RASH WITH LATEX PRODUCTS IN THE HEAT OF SUMMER        PHYSICAL EXAM:  HEAD, NECK AND TRUNK PATTERN:   Continuous multidirectional tremor  Right rotation  Left side bending  Slightly decreased ROM with head turning to left    HPI:    Patient denies new medical diagnoses, illnesses, hospitalizations, emergency room visits, and injuries since the previous injection with botulinum neurotoxin.     We reviewed the recommended safety guidelines for  Botox from any vaccine injection, such as the seasonal flu vaccine, by a minimum of 10-14 days with Kendra MARLEY Nola. She acknowledged understanding.    RESPONSE TO PREVIOUS TREATMENT:    Kendra Vaca received 200 units of 11/13/18.     Problems following the previous series of neurotoxin  injections included:  No problems reported    BENEFITS BY PATIENT REPORT:  Pain Improvement: Yes.  Percent Improvement: 90 %  Duration of Benefit: 10 weeks followed by a gradual reduction in benefit.      Tremors and dystonia Improvement: Yes.  Percent Improvement: 90% Duration of Benefit: 10 weeks followed by a gradual reduction in benefit. Noticeable tremors and pulling noted approximately 4 weeks ago. Kendra is 3 weeks later than her usual timing due to weather, and this is why she has been symptomatic for longer.     BOTULINUM NEUROTOXIN INJECTION PROCEDURES:    VERIFICATION OF PATIENT IDENTIFICATION AND PROCEDURE     Initials   Patient Name ses   Patient  ses   Procedure Verified by: ses     Prior to the start of the procedure and with procedural staff participation, I verbally confirmed the patient s identity using two indicators, relevant allergies, that the procedure was appropriate and matched the consent or emergent situation, and that the correct equipment/implants were available. Immediately prior to starting the procedure I conducted the Time Out with the procedural staff and re-confirmed the patient s name, procedure, and site/side. (The Joint Commission universal protocol was followed.)  Yes    Sedation (Moderate or Deep): None      Above assessments performed by:    Virginia Rosas MD      INDICATION/S FOR PROCEDURE/S:  Kendra Vaca is a 58-year-old patient with dystonia affecting the head, neck and shoulder girdle musculature secondary to a diagnosis of cervical and segmental dystonia with associated pain, tremor, loss of joint motion, loss of volitional motor control and difficulty with activities of daily living.       Her baseline symptoms have been recalcitrant to oral medications and conservative therapy. She is here today for re-evaluation and it was decided to proceed with re-injection of Botox.       TOTAL DOSE ADMINISTERED:  Dose Administered:  200 units Botox    Diluent Used:   Preservative Free Normal Saline  Total Volume of Diluent Used:  2 ml  Lot # /C3 with Expiration Date:  08/2021  NDC #: Botox 100u ([address]-11)    Medication guide was offered to patient and was declined.    CONSENT:  The risks, benefits, and treatment options were discussed with Kendra Vaca and she agreed to proceed.      Written consent was obtained by [facility].     EQUIPMENT USED:  Needle-37mm stimulating/recording  EMG/NCS Machine      SKIN PREPARATION:  Skin preparation was performed using an alcohol wipe.  Ethyl Chloride spray was used at injection sites for pain control.       GUIDANCE DESCRIPTION:  Electro-myographic guidance was necessary throughout the procedure to accurately identify all areas of dystonic muscles while avoiding injection of non-dystonic muscles, neighboring nerves and nearby vascular structures.     AREA/MUSCLE INJECTED:  200 UNITS BOTOX = TOTAL DOSE, 1:1 DILUTION    1. HEAD & NECK MUSCLES: Total dose = 200 units Botox, 1:1 dilution   Right Mid-Trapezius - 25 units of Botox at 1 site/s.   Left Mid-Trapezius - 15 units of Botox at 1 site/s.      Right lateral Trapezius - 30 units of Botox at 1 site.  Left lateral Trapezius - 10 units of Botox at 1 site.      Right Splenius Cervicis - 30 units of Botox at 1 site/s.   Left Splenius Cervicis - 15 units of Botox at 1 site/s.      Right Levator Scapulae - 25 units of Botox at 1 site/s (shoulder muscles).   Left Levator Scapulae - 10 units of Botox at 1 site/s (shoulder muscles).      Right Longissimus Capitis - 5 units of Botox at 1 site/s.       Right Inferior Obliquus Capitis - 5 units of Botox at 1 site/s.                  Right Sternocleidomastoid - 20 units of Botox at 1 site/s.    Left Sternocleidomastoid - 10 units of Botox at 1 site/s.       RESPONSE TO PROCEDURE:  Kendra Vaca tolerated the procedure well and there were no immediate complications.  She was allowed to recover for an appropriate period of time and was  discharged home in stable condition.    FOLLOW UP:  Kendra Vaca was asked to follow up by phone in 7-14 days with Laura Lebron PT, Care Coordinator or Anita Howell RN, Care Coordinator, to report her response to this series of injections.  Based on the patient's previous response to this therapy, Kendra Vaca was rescheduled for the next series of injections in 12 weeks.    PLAN (Medication Changes, Therapy Orders, Work or Disability Issues, etc.): Will monitor response to today's injections and report.    Again, thank you for allowing me to participate in the care of your patient.      Sincerely,    Virginia Rosas MD

## 2019-04-04 ENCOUNTER — OFFICE VISIT - HEALTHEAST (OUTPATIENT)
Dept: FAMILY MEDICINE | Facility: CLINIC | Age: 59
End: 2019-04-04

## 2019-04-04 DIAGNOSIS — B37.89 CANDIDIASIS OF BREAST: ICD-10-CM

## 2019-04-26 ENCOUNTER — RECORDS - HEALTHEAST (OUTPATIENT)
Dept: RADIOLOGY | Facility: CLINIC | Age: 59
End: 2019-04-26

## 2019-04-26 ENCOUNTER — RECORDS - HEALTHEAST (OUTPATIENT)
Dept: MAMMOGRAPHY | Facility: CLINIC | Age: 59
End: 2019-04-26

## 2019-04-26 DIAGNOSIS — Z12.31 ENCOUNTER FOR SCREENING MAMMOGRAM FOR MALIGNANT NEOPLASM OF BREAST: ICD-10-CM

## 2019-05-15 ENCOUNTER — OFFICE VISIT - HEALTHEAST (OUTPATIENT)
Dept: FAMILY MEDICINE | Facility: CLINIC | Age: 59
End: 2019-05-15

## 2019-05-15 ENCOUNTER — COMMUNICATION - HEALTHEAST (OUTPATIENT)
Dept: FAMILY MEDICINE | Facility: CLINIC | Age: 59
End: 2019-05-15

## 2019-05-15 DIAGNOSIS — Z72.0 NICOTINE ABUSE: ICD-10-CM

## 2019-05-15 DIAGNOSIS — M79.2 NEURALGIA: ICD-10-CM

## 2019-05-15 DIAGNOSIS — R25.1 TREMOR: ICD-10-CM

## 2019-05-15 DIAGNOSIS — L30.9 DERMATITIS: ICD-10-CM

## 2019-05-15 DIAGNOSIS — E66.3 OVERWEIGHT (BMI 25.0-29.9): ICD-10-CM

## 2019-05-15 LAB
ANION GAP SERPL CALCULATED.3IONS-SCNC: 11 MMOL/L (ref 5–18)
BUN SERPL-MCNC: 11 MG/DL (ref 8–22)
CALCIUM SERPL-MCNC: 9.6 MG/DL (ref 8.5–10.5)
CHLORIDE BLD-SCNC: 104 MMOL/L (ref 98–107)
CO2 SERPL-SCNC: 25 MMOL/L (ref 22–31)
CREAT SERPL-MCNC: 0.77 MG/DL (ref 0.6–1.1)
GFR SERPL CREATININE-BSD FRML MDRD: >60 ML/MIN/1.73M2
GLUCOSE BLD-MCNC: 87 MG/DL (ref 70–125)
POTASSIUM BLD-SCNC: 4.1 MMOL/L (ref 3.5–5)
SODIUM SERPL-SCNC: 140 MMOL/L (ref 136–145)

## 2019-05-22 ENCOUNTER — OFFICE VISIT (OUTPATIENT)
Dept: PHYSICAL MEDICINE AND REHAB | Facility: CLINIC | Age: 59
End: 2019-05-22
Payer: MEDICARE

## 2019-05-22 VITALS
OXYGEN SATURATION: 97 % | TEMPERATURE: 97.6 F | SYSTOLIC BLOOD PRESSURE: 106 MMHG | WEIGHT: 170.4 LBS | HEART RATE: 78 BPM | BODY MASS INDEX: 30.19 KG/M2 | RESPIRATION RATE: 18 BRPM | DIASTOLIC BLOOD PRESSURE: 70 MMHG | HEIGHT: 63 IN

## 2019-05-22 DIAGNOSIS — G24.1 GENETIC TORSION DYSTONIA: ICD-10-CM

## 2019-05-22 DIAGNOSIS — G24.3 SPASMODIC TORTICOLLIS: Primary | ICD-10-CM

## 2019-05-22 RX ORDER — KETOCONAZOLE 20 MG/G
CREAM TOPICAL
COMMUNITY
Start: 2019-05-15

## 2019-05-22 ASSESSMENT — PAIN SCALES - GENERAL: PAINLEVEL: EXTREME PAIN (8)

## 2019-05-22 ASSESSMENT — MIFFLIN-ST. JEOR: SCORE: 1322.06

## 2019-05-22 NOTE — PROGRESS NOTES
"BOTULINUM TOXIN PROCEDURE NOTE    Chief Complaint   Patient presents with     Dystonia     [facility] RETURN BOTOX- CERVICAL DYSTONIA     /70   Pulse 78   Temp 97.6  F (36.4  C) (Oral)   Resp 18   Ht 1.6 m (5' 3\")   Wt 77.3 kg (170 lb 6.4 oz)   LMP 02/15/2012   SpO2 97%   BMI 30.19 kg/m      Current Outpatient Medications:      acetaminophen (TYLENOL) 500 MG tablet, Take 500-1,000 mg by mouth every 6 hours as needed for mild pain (can take 4 per day), Disp: , Rfl:      azelastine (OPTIVAR) 0.05 % SOLN, Place 1 drop into both eyes as needed , Disp: , Rfl:      ketoconazole (NIZORAL) 2 % external cream, , Disp: , Rfl:      penciclovir (DENAVIR) 1 % cream, Apply 1 g topically every 2 hours, Disp: , Rfl:      EPINEPHrine (EPIPEN JR) 0.15 MG/0.3ML injection, Inject 0.15 mg into the muscle as needed for anaphylaxis, Disp: , Rfl:      Allergies   Allergen Reactions     Morphine Hives     Morphine Sulfate Hives     Seafood Hives     Latex      CONTACT RASH WITH LATEX PRODUCTS IN THE HEAT OF SUMMER        PHYSICAL EXAM:  HEAD, NECK AND TRUNK PATTERN:   Continuous multidirectional tremor  Right rotation  Left side bending  Slightly decreased ROM with head turning to left    HPI:    Patient is being treated with topical medication for a yeast infection on her chest. Otherwise, she denies new medical diagnoses, illnesses, hospitalizations, emergency room visits, and injuries since the previous injection with botulinum neurotoxin.     We reviewed the recommended safety guidelines for  Botox from any vaccine injection, such as the seasonal flu vaccine, by a minimum of 10-14 days with Kendra MARLEY Genetbartolo. She acknowledged understanding.    RESPONSE TO PREVIOUS TREATMENT:    Kendra Vaca received 200 units of 2/25/2019.     Problems following the previous series of neurotoxin injections included:  No problems reported    BENEFITS BY PATIENT REPORT:  Pain Improvement: Yes.  Percent Improvement: 90 %  Duration " of Benefit: 10 weeks followed by a gradual reduction in benefit.      Tremors and dystonia Improvement: Yes.  Percent Improvement: 90% Duration of Benefit: 10 weeks followed by a gradual reduction in benefit. More noticeable pain and tremors over the last week.     BOTULINUM NEUROTOXIN INJECTION PROCEDURES:    VERIFICATION OF PATIENT IDENTIFICATION AND PROCEDURE     Initials   Patient Name ses   Patient  ses   Procedure Verified by: danilo     Prior to the start of the procedure and with procedural staff participation, I verbally confirmed the patient s identity using two indicators, relevant allergies, that the procedure was appropriate and matched the consent or emergent situation, and that the correct equipment/implants were available. Immediately prior to starting the procedure I conducted the Time Out with the procedural staff and re-confirmed the patient s name, procedure, and site/side. (The Joint Commission universal protocol was followed.)  Yes    Sedation (Moderate or Deep): None      Above assessments performed by:    Virginia Rosas MD      INDICATION/S FOR PROCEDURE/S:  Kendra Vaca is a 58-year-old patient with dystonia affecting the head, neck and shoulder girdle musculature secondary to a diagnosis of cervical and segmental dystonia with associated pain, tremor, loss of joint motion, loss of volitional motor control and difficulty with activities of daily living.       Her baseline symptoms have been recalcitrant to oral medications and conservative therapy. She is here today for re-evaluation and it was decided to proceed with re-injection of Botox.       TOTAL DOSE ADMINISTERED:  Dose Administered:  200 units Botox    Diluent Used:  Preservative Free Normal Saline  Total Volume of Diluent Used:  2 ml  Lot # /C3 with Expiration Date:  2021  NDC #: Botox 100u ([address]-34)    Medication guide was offered to patient and was declined.    CONSENT:  The risks, benefits, and treatment  options were discussed with Kendra Vaca and she agreed to proceed.      Written consent was obtained by [facility].     EQUIPMENT USED:  Needle-37mm stimulating/recording  EMG/NCS Machine      SKIN PREPARATION:  Skin preparation was performed using an alcohol wipe.  Ethyl Chloride spray was used at injection sites for pain control.       GUIDANCE DESCRIPTION:  Electro-myographic guidance was necessary throughout the procedure to accurately identify all areas of dystonic muscles while avoiding injection of non-dystonic muscles, neighboring nerves and nearby vascular structures.     AREA/MUSCLE INJECTED:  200 UNITS BOTOX = TOTAL DOSE, 1:1 DILUTION    1. HEAD & NECK MUSCLES: Total dose = 200 units Botox, 1:1 dilution   Right Mid-Trapezius - 25 units of Botox at 1 site/s.   Left Mid-Trapezius - 15 units of Botox at 1 site/s.      Right lateral Trapezius - 30 units of Botox at 1 site.  Left lateral Trapezius - 10 units of Botox at 1 site.      Right Splenius Cervicis - 30 units of Botox at 1 site/s.   Left Splenius Cervicis - 15 units of Botox at 1 site/s.      Right Levator Scapulae - 25 units of Botox at 1 site/s (shoulder muscles).   Left Levator Scapulae - 10 units of Botox at 1 site/s (shoulder muscles).      Right Longissimus Capitis - 5 units of Botox at 1 site/s.       Right Inferior Obliquus Capitis - 5 units of Botox at 1 site/s.                  Right Sternocleidomastoid - 20 units of Botox at 1 site/s.    Left Sternocleidomastoid - 10 units of Botox at 1 site/s.       RESPONSE TO PROCEDURE:  Kendra Vaca tolerated the procedure well and there were no immediate complications.  She was allowed to recover for an appropriate period of time and was discharged home in stable condition.    FOLLOW UP:  Kendra Vaca was asked to follow up by phone in 7-14 days with Laura Lebron PT, Care Coordinator or Anita Howell RN, Care Coordinator, to report her response to this series of injections.   Based on the patient's previous response to this therapy, Kendra Vaca was rescheduled for the next series of injections in 12 weeks.    PLAN (Medication Changes, Therapy Orders, Work or Disability Issues, etc.): Will monitor response to today's injections and report.

## 2019-05-22 NOTE — NURSING NOTE
Chief Complaint   Patient presents with     Dystonia     [facility] RETURN BOTOX- CERVICAL DYSTONIA       Angel Vale, EMT

## 2019-05-22 NOTE — LETTER
"5/22/2019       RE: Kendra Clarosdenton  [address] [place] [address]     Dear Colleague,    Thank you for referring your patient, Kendra Vaca, to the [facility] PHYSICAL MEDICINE AND REHABILITATION at [facility]. Please see a copy of my visit note below.    BOTULINUM TOXIN PROCEDURE NOTE    Chief Complaint   Patient presents with     Dystonia     [facility] RETURN BOTOX- CERVICAL DYSTONIA     /70   Pulse 78   Temp 97.6  F (36.4  C) (Oral)   Resp 18   Ht 1.6 m (5' 3\")   Wt 77.3 kg (170 lb 6.4 oz)   LMP 02/15/2012   SpO2 97%   BMI 30.19 kg/m       Current Outpatient Medications:      acetaminophen (TYLENOL) 500 MG tablet, Take 500-1,000 mg by mouth every 6 hours as needed for mild pain (can take 4 per day), Disp: , Rfl:      azelastine (OPTIVAR) 0.05 % SOLN, Place 1 drop into both eyes as needed , Disp: , Rfl:      ketoconazole (NIZORAL) 2 % external cream, , Disp: , Rfl:      penciclovir (DENAVIR) 1 % cream, Apply 1 g topically every 2 hours, Disp: , Rfl:      EPINEPHrine (EPIPEN JR) 0.15 MG/0.3ML injection, Inject 0.15 mg into the muscle as needed for anaphylaxis, Disp: , Rfl:      Allergies   Allergen Reactions     Morphine Hives     Morphine Sulfate Hives     Seafood Hives     Latex      CONTACT RASH WITH LATEX PRODUCTS IN THE HEAT OF SUMMER        PHYSICAL EXAM:  HEAD, NECK AND TRUNK PATTERN:   Continuous multidirectional tremor  Right rotation  Left side bending  Slightly decreased ROM with head turning to left    HPI:    Patient is being treated with topical medication for a yeast infection on her chest. Otherwise, she denies new medical diagnoses, illnesses, hospitalizations, emergency room visits, and injuries since the previous injection with botulinum neurotoxin.     We reviewed the recommended safety guidelines for  Botox from any vaccine injection, such as the seasonal flu vaccine, by a minimum of 10-14 days with Kendra Vaca. " She acknowledged understanding.    RESPONSE TO PREVIOUS TREATMENT:    Kendra Vaca received 200 units of 2019.     Problems following the previous series of neurotoxin injections included:  No problems reported    BENEFITS BY PATIENT REPORT:  Pain Improvement: Yes.  Percent Improvement: 90 %  Duration of Benefit: 10 weeks followed by a gradual reduction in benefit.      Tremors and dystonia Improvement: Yes.  Percent Improvement: 90% Duration of Benefit: 10 weeks followed by a gradual reduction in benefit. More noticeable pain and tremors over the last week.     BOTULINUM NEUROTOXIN INJECTION PROCEDURES:    VERIFICATION OF PATIENT IDENTIFICATION AND PROCEDURE     Initials   Patient Name ses   Patient  ses   Procedure Verified by: ses     Prior to the start of the procedure and with procedural staff participation, I verbally confirmed the patient s identity using two indicators, relevant allergies, that the procedure was appropriate and matched the consent or emergent situation, and that the correct equipment/implants were available. Immediately prior to starting the procedure I conducted the Time Out with the procedural staff and re-confirmed the patient s name, procedure, and site/side. (The Joint Commission universal protocol was followed.)  Yes    Sedation (Moderate or Deep): None      Above assessments performed by:    Virginia Rosas MD      INDICATION/S FOR PROCEDURE/S:  Kendra Vaca is a 58-year-old patient with dystonia affecting the head, neck and shoulder girdle musculature secondary to a diagnosis of cervical and segmental dystonia with associated pain, tremor, loss of joint motion, loss of volitional motor control and difficulty with activities of daily living.       Her baseline symptoms have been recalcitrant to oral medications and conservative therapy. She is here today for re-evaluation and it was decided to proceed with re-injection of Botox.       TOTAL DOSE  ADMINISTERED:  Dose Administered:  200 units Botox    Diluent Used:  Preservative Free Normal Saline  Total Volume of Diluent Used:  2 ml  Lot # /C3 with Expiration Date:  09/2021  NDC #: Botox 100u ([address]-44)    Medication guide was offered to patient and was declined.    CONSENT:  The risks, benefits, and treatment options were discussed with Kendra Vaca and she agreed to proceed.      Written consent was obtained by [facility].     EQUIPMENT USED:  Needle-37mm stimulating/recording  EMG/NCS Machine      SKIN PREPARATION:  Skin preparation was performed using an alcohol wipe.  Ethyl Chloride spray was used at injection sites for pain control.       GUIDANCE DESCRIPTION:  Electro-myographic guidance was necessary throughout the procedure to accurately identify all areas of dystonic muscles while avoiding injection of non-dystonic muscles, neighboring nerves and nearby vascular structures.     AREA/MUSCLE INJECTED:  200 UNITS BOTOX = TOTAL DOSE, 1:1 DILUTION    1. HEAD & NECK MUSCLES: Total dose = 200 units Botox, 1:1 dilution   Right Mid-Trapezius - 25 units of Botox at 1 site/s.   Left Mid-Trapezius - 15 units of Botox at 1 site/s.      Right lateral Trapezius - 30 units of Botox at 1 site.  Left lateral Trapezius - 10 units of Botox at 1 site.      Right Splenius Cervicis - 30 units of Botox at 1 site/s.   Left Splenius Cervicis - 15 units of Botox at 1 site/s.      Right Levator Scapulae - 25 units of Botox at 1 site/s (shoulder muscles).   Left Levator Scapulae - 10 units of Botox at 1 site/s (shoulder muscles).      Right Longissimus Capitis - 5 units of Botox at 1 site/s.       Right Inferior Obliquus Capitis - 5 units of Botox at 1 site/s.                  Right Sternocleidomastoid - 20 units of Botox at 1 site/s.    Left Sternocleidomastoid - 10 units of Botox at 1 site/s.       RESPONSE TO PROCEDURE:  Kendra Vaca tolerated the procedure well and there were no immediate complications.   She was allowed to recover for an appropriate period of time and was discharged home in stable condition.    FOLLOW UP:  Kendra Vaca was asked to follow up by phone in 7-14 days with Laura Lebron PT, Care Coordinator or Anita Howell RN, Care Coordinator, to report her response to this series of injections.  Based on the patient's previous response to this therapy, Kendra Vaca was rescheduled for the next series of injections in 12 weeks.    PLAN (Medication Changes, Therapy Orders, Work or Disability Issues, etc.): Will monitor response to today's injections and report.    Again, thank you for allowing me to participate in the care of your patient.      Sincerely,    Virginia Rosas MD

## 2019-07-22 ENCOUNTER — OFFICE VISIT - HEALTHEAST (OUTPATIENT)
Dept: FAMILY MEDICINE | Facility: CLINIC | Age: 59
End: 2019-07-22

## 2019-07-22 DIAGNOSIS — L30.9 DERMATITIS: ICD-10-CM

## 2019-07-22 DIAGNOSIS — B37.89 CANDIDIASIS OF BREAST: ICD-10-CM

## 2019-07-22 DIAGNOSIS — J02.9 SORE THROAT: ICD-10-CM

## 2019-07-22 LAB — DEPRECATED S PYO AG THROAT QL EIA: NORMAL

## 2019-07-22 ASSESSMENT — MIFFLIN-ST. JEOR: SCORE: 1324.52

## 2019-07-23 LAB — GROUP A STREP BY PCR: NORMAL

## 2019-08-20 ENCOUNTER — OFFICE VISIT (OUTPATIENT)
Dept: PHYSICAL MEDICINE AND REHAB | Facility: CLINIC | Age: 59
End: 2019-08-20
Payer: MEDICARE

## 2019-08-20 VITALS
WEIGHT: 166.2 LBS | HEART RATE: 81 BPM | BODY MASS INDEX: 29.44 KG/M2 | DIASTOLIC BLOOD PRESSURE: 82 MMHG | SYSTOLIC BLOOD PRESSURE: 125 MMHG | TEMPERATURE: 98.2 F | OXYGEN SATURATION: 95 %

## 2019-08-20 DIAGNOSIS — G24.3 SPASMODIC TORTICOLLIS: Primary | ICD-10-CM

## 2019-08-20 DIAGNOSIS — G24.1 GENETIC TORSION DYSTONIA: ICD-10-CM

## 2019-08-20 ASSESSMENT — PAIN SCALES - GENERAL: PAINLEVEL: SEVERE PAIN (7)

## 2019-08-20 NOTE — PROGRESS NOTES
BOTULINUM TOXIN PROCEDURE NOTE    Chief Complaint   Patient presents with     Botox     [facility] RETURN - BOTOX     /82 (BP Location: Left arm, Patient Position: Sitting, Cuff Size: Adult Regular)   Pulse 81   Temp 98.2  F (36.8  C) (Oral)   Wt 75.4 kg (166 lb 3.2 oz)   LMP 02/15/2012   SpO2 95%   BMI 29.44 kg/m      Current Outpatient Medications:      acetaminophen (TYLENOL) 500 MG tablet, Take 500-1,000 mg by mouth every 6 hours as needed for mild pain (can take 4 per day), Disp: , Rfl:      azelastine (OPTIVAR) 0.05 % SOLN, Place 1 drop into both eyes as needed , Disp: , Rfl:      EPINEPHrine (EPIPEN JR) 0.15 MG/0.3ML injection, Inject 0.15 mg into the muscle as needed for anaphylaxis, Disp: , Rfl:      ketoconazole (NIZORAL) 2 % external cream, , Disp: , Rfl:      penciclovir (DENAVIR) 1 % cream, Apply 1 g topically every 2 hours, Disp: , Rfl:     Current Facility-Administered Medications:      botulinum toxin type A (BOTOX) 100 units injection 200 Units, 200 Units, Intramuscular, Once, Standal, Virginia Kong MD     Allergies   Allergen Reactions     Morphine Hives     Morphine Sulfate Hives     Seafood Hives     Latex      CONTACT RASH WITH LATEX PRODUCTS IN THE HEAT OF SUMMER        PHYSICAL EXAM:  HEAD, NECK AND TRUNK PATTERN:   Continuous multidirectional tremor  Right rotation  Left side bending  Slightly decreased ROM with head turning to left    HPI:  She recently lost her partner and is stating that, understandably, her baseline tremor symptoms are worse since losing her friend and partner.    Patient denies new medical diagnoses, illnesses, hospitalizations, emergency room visits, and injuries since the previous injection with botulinum neurotoxin.  However, she does report that she is experiencing skin rashes, but is working with her PCP regarding this issue.      We reviewed the recommended safety guidelines for  Botox from any vaccine injection, such as the seasonal flu vaccine,  by a minimum of 10-14 days with Kendra Vaca. She acknowledged understanding.    RESPONSE TO PREVIOUS TREATMENT:    Kendra Vaca received 200 units of 2019.     Problems following the previous series of neurotoxin injections included:  No problems reported    BENEFITS BY PATIENT REPORT:  Pain Improvement: Yes.  Percent Improvement: 90%  Duration of Benefit: 10 weeks followed by a gradual reduction in benefit.      Tremors and dystonia Improvement: Yes.  Percent Improvement: 90% Duration of Benefit: 10 weeks followed by a gradual reduction in benefit. More noticeable pain and tremors over the last week.       BOTULINUM NEUROTOXIN INJECTION PROCEDURES:    VERIFICATION OF PATIENT IDENTIFICATION AND PROCEDURE     Initials   Patient Name tlb   Patient  tlb   Procedure Verified by: tlb     Prior to the start of the procedure and with procedural staff participation, I verbally confirmed the patient s identity using two indicators, relevant allergies, that the procedure was appropriate and matched the consent or emergent situation, and that the correct equipment/implants were available. Immediately prior to starting the procedure I conducted the Time Out with the procedural staff and re-confirmed the patient s name, procedure, and site/side. (The Joint Commission universal protocol was followed.)  Yes    Sedation (Moderate or Deep): None      Above assessments performed by:    Laura Lebron, PT - Care Coordinator    Virginia Rosas MD      INDICATION/S FOR PROCEDURE/S:  Kendra Vaca is a 59-year-old patient with dystonia affecting the head, neck and shoulder girdle musculature secondary to a diagnosis of cervical and segmental dystonia with associated pain, tremor, loss of joint motion, loss of volitional motor control and difficulty with activities of daily living.       Her baseline symptoms have been recalcitrant to oral medications and conservative therapy. She is here today for  re-evaluation and it was decided to proceed with re-injection of Botox.       TOTAL DOSE ADMINISTERED:  Dose Administered:  200 units Botox    Diluent Used:  Preservative Free Normal Saline  Total Volume of Diluent Used:  2 ml  Lot # /C3 with Expiration Date:  03/2022  NDC #: Botox 100u ([id number])    Medication guide was offered to patient and was declined.    CONSENT:  The risks, benefits, and treatment options were discussed with Kendra MARLEY Harlandenton and she agreed to proceed.      Written consent was obtained by [facility].     EQUIPMENT USED:  Needle-37mm stimulating/recording  EMG/NCS Machine      SKIN PREPARATION:  Skin preparation was performed using an alcohol wipe.  Ethyl Chloride spray was used at injection sites for pain control.       GUIDANCE DESCRIPTION:  Electro-myographic guidance was necessary throughout the procedure to accurately identify all areas of dystonic muscles while avoiding injection of non-dystonic muscles, neighboring nerves and nearby vascular structures.     AREA/MUSCLE INJECTED:  200 UNITS BOTOX = TOTAL DOSE, 1:1 DILUTION    1. HEAD & NECK MUSCLES: Total dose = 200 units Botox, 1:1 dilution   Right Mid-Trapezius - 25 units of Botox at 1 site/s.   Left Mid-Trapezius - 15 units of Botox at 1 site/s.      Right lateral Trapezius - 30 units of Botox at 1 site.  Left lateral Trapezius - 10 units of Botox at 1 site.      Right Splenius Cervicis - 30 units of Botox at 1 site/s.   Left Splenius Cervicis - 15 units of Botox at 1 site/s.      Right Levator Scapulae - 25 units of Botox at 1 site/s (shoulder muscles).   Left Levator Scapulae - 10 units of Botox at 1 site/s (shoulder muscles).      Right Longissimus Capitis - 5 units of Botox at 1 site/s.       Right Inferior Obliquus Capitis - 5 units of Botox at 1 site/s.                  Right Sternocleidomastoid - 20 units of Botox at 1 site/s.    Left Sternocleidomastoid - 10 units of Botox at 1 site/s.       RESPONSE TO PROCEDURE:  Kendra  DONELL Vaca tolerated the procedure well and there were no immediate complications. She was allowed to recover for an appropriate period of time and was discharged home in stable condition.    FOLLOW UP:  Kendra Vaca was asked to follow up by phone in 7-14 days with Laura Lebron PT, Care Coordinator or Anita Howell RN, Care Coordinator, to report her response to this series of injections.  Based on the patient's previous response to this therapy, Kendra Vaca was rescheduled for the next series of injections in 12 weeks.    PLAN (Medication Changes, Therapy Orders, Work or Disability Issues, etc.): Will monitor response to today's injections and report.

## 2019-08-20 NOTE — NURSING NOTE
Chief Complaint   Patient presents with     Botox     [facility] RETURN - BOTOX       Jalil Ferguson, EMT

## 2019-08-20 NOTE — LETTER
8/20/2019       RE: Kendra Vaca  [address] [place] [address]     Dear Colleague,    Thank you for referring your patient, Kendra Vaca, to the [facility] PHYSICAL MEDICINE AND REHABILITATION at [facility]. Please see a copy of my visit note below.    BOTULINUM TOXIN PROCEDURE NOTE  Chief Complaint   Patient presents with     Botox     [facility] RETURN - BOTOX     /82 (BP Location: Left arm, Patient Position: Sitting, Cuff Size: Adult Regular)   Pulse 81   Temp 98.2  F (36.8  C) (Oral)   Wt 75.4 kg (166 lb 3.2 oz)   LMP 02/15/2012   SpO2 95%   BMI 29.44 kg/m       Current Outpatient Medications:      acetaminophen (TYLENOL) 500 MG tablet, Take 500-1,000 mg by mouth every 6 hours as needed for mild pain (can take 4 per day), Disp: , Rfl:      azelastine (OPTIVAR) 0.05 % SOLN, Place 1 drop into both eyes as needed , Disp: , Rfl:      EPINEPHrine (EPIPEN JR) 0.15 MG/0.3ML injection, Inject 0.15 mg into the muscle as needed for anaphylaxis, Disp: , Rfl:      ketoconazole (NIZORAL) 2 % external cream, , Disp: , Rfl:      penciclovir (DENAVIR) 1 % cream, Apply 1 g topically every 2 hours, Disp: , Rfl:     Current Facility-Administered Medications:      botulinum toxin type A (BOTOX) 100 units injection 200 Units, 200 Units, Intramuscular, Once, Standal, Virginia Kong MD     Allergies   Allergen Reactions     Morphine Hives     Morphine Sulfate Hives     Seafood Hives     Latex      CONTACT RASH WITH LATEX PRODUCTS IN THE HEAT OF SUMMER      PHYSICAL EXAM:  HEAD, NECK AND TRUNK PATTERN:   Continuous multidirectional tremor  Right rotation  Left side bending  Slightly decreased ROM with head turning to left    HPI:  She recently lost her partner and is stating that, understandably, her baseline tremor symptoms are worse since losing her friend and partner.    Patient denies new medical diagnoses, illnesses, hospitalizations, emergency room visits, and  injuries since the previous injection with botulinum neurotoxin.  However, she does report that she is experiencing skin rashes, but is working with her PCP regarding this issue.      We reviewed the recommended safety guidelines for  Botox from any vaccine injection, such as the seasonal flu vaccine, by a minimum of 10-14 days with Kendra Vaca. She acknowledged understanding.    RESPONSE TO PREVIOUS TREATMENT:    Kendra Vaca received 200 units of 2019.     Problems following the previous series of neurotoxin injections included:  No problems reported    BENEFITS BY PATIENT REPORT:  Pain Improvement: Yes.  Percent Improvement: 90%  Duration of Benefit: 10 weeks followed by a gradual reduction in benefit.      Tremors and dystonia Improvement: Yes.  Percent Improvement: 90% Duration of Benefit: 10 weeks followed by a gradual reduction in benefit. More noticeable pain and tremors over the last week.       BOTULINUM NEUROTOXIN INJECTION PROCEDURES:    VERIFICATION OF PATIENT IDENTIFICATION AND PROCEDURE     Initials   Patient Name tlb   Patient  tlb   Procedure Verified by: tlb     Prior to the start of the procedure and with procedural staff participation, I verbally confirmed the patient s identity using two indicators, relevant allergies, that the procedure was appropriate and matched the consent or emergent situation, and that the correct equipment/implants were available. Immediately prior to starting the procedure I conducted the Time Out with the procedural staff and re-confirmed the patient s name, procedure, and site/side. (The Joint Commission universal protocol was followed.)  Yes    Sedation (Moderate or Deep): None  Above assessments performed by:    Laura Lebron, PT - Care Coordinator    Virginia Rosas MD    INDICATION/S FOR PROCEDURE/S:  Kendra Vaca is a 59-year-old patient with dystonia affecting the head, neck and shoulder girdle musculature secondary to  a diagnosis of cervical and segmental dystonia with associated pain, tremor, loss of joint motion, loss of volitional motor control and difficulty with activities of daily living.       Her baseline symptoms have been recalcitrant to oral medications and conservative therapy. She is here today for re-evaluation and it was decided to proceed with re-injection of Botox.       TOTAL DOSE ADMINISTERED:  Dose Administered:  200 units Botox    Diluent Used:  Preservative Free Normal Saline  Total Volume of Diluent Used:  2 ml  Lot # /C3 with Expiration Date:  03/2022  NDC #: Botox 100u ([address]-54)    Medication guide was offered to patient and was declined.    CONSENT:  The risks, benefits, and treatment options were discussed with Kendra Vaca and she agreed to proceed.      Written consent was obtained by [facility].     EQUIPMENT USED:  Needle-37mm stimulating/recording  EMG/NCS Machine      SKIN PREPARATION:  Skin preparation was performed using an alcohol wipe.  Ethyl Chloride spray was used at injection sites for pain control.       GUIDANCE DESCRIPTION:  Electro-myographic guidance was necessary throughout the procedure to accurately identify all areas of dystonic muscles while avoiding injection of non-dystonic muscles, neighboring nerves and nearby vascular structures.     AREA/MUSCLE INJECTED:  200 UNITS BOTOX = TOTAL DOSE, 1:1 DILUTION    1. HEAD & NECK MUSCLES: Total dose = 200 units Botox, 1:1 dilution   Right Mid-Trapezius - 25 units of Botox at 1 site/s.   Left Mid-Trapezius - 15 units of Botox at 1 site/s.      Right lateral Trapezius - 30 units of Botox at 1 site.  Left lateral Trapezius - 10 units of Botox at 1 site.      Right Splenius Cervicis - 30 units of Botox at 1 site/s.   Left Splenius Cervicis - 15 units of Botox at 1 site/s.      Right Levator Scapulae - 25 units of Botox at 1 site/s (shoulder muscles).   Left Levator Scapulae - 10 units of Botox at 1 site/s (shoulder  muscles).      Right Longissimus Capitis - 5 units of Botox at 1 site/s.       Right Inferior Obliquus Capitis - 5 units of Botox at 1 site/s.                Right Sternocleidomastoid - 20 units of Botox at 1 site/s.    Left Sternocleidomastoid - 10 units of Botox at 1 site/s.     RESPONSE TO PROCEDURE:  Kendra Vaca tolerated the procedure well and there were no immediate complications. She was allowed to recover for an appropriate period of time and was discharged home in stable condition.    FOLLOW UP:  Kendra Vaca was asked to follow up by phone in 7-14 days with Laura Lebron PT, Care Coordinator or Anita Howell RN, Care Coordinator, to report her response to this series of injections.  Based on the patient's previous response to this therapy, Kendra Vaca was rescheduled for the next series of injections in 12 weeks.    PLAN (Medication Changes, Therapy Orders, Work or Disability Issues, etc.): Will monitor response to today's injections and report.

## 2019-12-04 ENCOUNTER — OFFICE VISIT (OUTPATIENT)
Dept: PHYSICAL MEDICINE AND REHAB | Facility: CLINIC | Age: 59
End: 2019-12-04
Payer: MEDICARE

## 2019-12-04 VITALS
DIASTOLIC BLOOD PRESSURE: 90 MMHG | TEMPERATURE: 98.1 F | SYSTOLIC BLOOD PRESSURE: 151 MMHG | HEART RATE: 78 BPM | BODY MASS INDEX: 29.41 KG/M2 | RESPIRATION RATE: 16 BRPM | HEIGHT: 63 IN | WEIGHT: 166 LBS | OXYGEN SATURATION: 95 %

## 2019-12-04 DIAGNOSIS — G24.3 SPASMODIC TORTICOLLIS: Primary | ICD-10-CM

## 2019-12-04 DIAGNOSIS — G24.1 GENETIC TORSION DYSTONIA: ICD-10-CM

## 2019-12-04 ASSESSMENT — PAIN SCALES - GENERAL: PAINLEVEL: NO PAIN (0)

## 2019-12-04 ASSESSMENT — MIFFLIN-ST. JEOR: SCORE: 1297.1

## 2019-12-04 NOTE — LETTER
"12/4/2019       RE: Kendra Vaca  [address] [place] [address]     Dear Colleague,    Thank you for referring your patient, Kendra Vaca, to the [facility] PHYSICAL MEDICINE AND REHABILITATION at [facility]. Please see a copy of my visit note below.    BOTULINUM TOXIN PROCEDURE NOTE    Chief Complaint   Patient presents with     Dystonia     [facility] RETURN BOTOX 200 U CERVICAL DYSTONIA TOPICAL LIDOCAINE CREAM & ETHYL-CHLORIDE SPRAY      BP (!) 151/90 (BP Location: Left arm, Patient Position: Chair, Cuff Size: Adult Regular)   Pulse 78   Temp 98.1  F (36.7  C) (Oral)   Resp 16   Ht 1.6 m (5' 3\")   Wt 75.3 kg (166 lb)   LMP 02/15/2012   SpO2 95%   BMI 29.41 kg/m       Current Outpatient Medications:      acetaminophen (TYLENOL) 500 MG tablet, Take 500-1,000 mg by mouth every 6 hours as needed for mild pain (can take 4 per day), Disp: , Rfl:      azelastine (OPTIVAR) 0.05 % SOLN, Place 1 drop into both eyes as needed , Disp: , Rfl:      EPINEPHrine (EPIPEN JR) 0.15 MG/0.3ML injection, Inject 0.15 mg into the muscle as needed for anaphylaxis, Disp: , Rfl:      ketoconazole (NIZORAL) 2 % external cream, , Disp: , Rfl:      penciclovir (DENAVIR) 1 % cream, Apply 1 g topically every 2 hours, Disp: , Rfl:      Allergies   Allergen Reactions     Morphine Hives     Morphine Sulfate Hives     Seafood Hives     Latex      CONTACT RASH WITH LATEX PRODUCTS IN THE HEAT OF SUMMER        PHYSICAL EXAM:  HEAD, NECK AND TRUNK PATTERN:   Continuous multidirectional tremor  Right rotation  Left side bending  Slightly decreased ROM with head turning to left  Pt reports excessive pain at right neck today.    HPI:     Patient reports the following new medical problems since last visit: ER visit for excessive right thigh pain.  Treated with Flexeril. Resolved.    We reviewed the recommended safety guidelines for  Botox from any vaccine injection, such as the seasonal " flu vaccine, by a minimum of 10-14 days with Kendra Vaca. She acknowledged understanding.    RESPONSE TO PREVIOUS TREATMENT:    Kendra Vaca received 200 units of Botox on 2019.    Problems following the previous series of neurotoxin injections included:  No problems reported    BENEFITS BY PATIENT REPORT:  Pain Improvement: Yes.  Percent Improvement: 90%  Duration of Benefit: 10 weeks followed by a gradual reduction in benefit.      Tremors and dystonia Improvement: Yes.  Percent Improvement: 90% Duration of Benefit: 10 weeks followed by a gradual reduction in benefit. More noticeable pain and tremors over the last week.       BOTULINUM NEUROTOXIN INJECTION PROCEDURES:    VERIFICATION OF PATIENT IDENTIFICATION AND PROCEDURE     Initials   Patient Name lmd   Patient  lmd   Procedure Verified by: lmd     Prior to the start of the procedure and with procedural staff participation, I verbally confirmed the patient s identity using two indicators, relevant allergies, that the procedure was appropriate and matched the consent or emergent situation, and that the correct equipment/implants were available. Immediately prior to starting the procedure I conducted the Time Out with the procedural staff and re-confirmed the patient s name, procedure, and site/side. (The Joint Commission universal protocol was followed.)  Yes    Sedation (Moderate or Deep): None    Above assessments performed by:  Anita Davidson RN Care Coordinator    Virginia Rosas MD    INDICATION/S FOR PROCEDURE/S:  Kendra Vaca is a 59-year-old patient with dystonia affecting the head, neck and shoulder girdle musculature secondary to a diagnosis of cervical and segmental dystonia with associated pain, tremor, loss of joint motion, loss of volitional motor control and difficulty with activities of daily living.       Her baseline symptoms have been recalcitrant to oral medications and conservative therapy. She is here  today for re-evaluation and it was decided to proceed with re-injection of Botox    TOTAL DOSE ADMINISTERED:  Dose Administered:  200 units Botox    Diluent Used:  Preservative Free Normal Saline  Total Volume of Diluent Used:  2.0 ml  Lot # /C3 with Expiration Date:  05/2022  NDC #: Botox 100u ([address]-23)    Medication guide was offered to patient and was declined.    CONSENT:  The risks, benefits, and treatment options were discussed with Kendra Vaca and she agreed to proceed.      Written consent was obtained by lmd.     EQUIPMENT USED:  Needle-37mm stimulating/recording  EMG/NCS Machine      SKIN PREPARATION:  Skin preparation was performed using an alcohol wipe.  Ethyl Chloride spray was used at injection sites for pain control.       GUIDANCE DESCRIPTION:  Electro-myographic guidance was necessary throughout the procedure to accurately identify all areas of dystonic muscles while avoiding injection of non-dystonic muscles, neighboring nerves and nearby vascular structures.     AREA/MUSCLE INJECTED:  200 UNITS BOTOX = TOTAL DOSE, 1:1 DILUTION     1. HEAD & NECK MUSCLES: Total dose = 200 units Botox, 1:1 Dilution     Right Mid-Trapezius - 25 units of Botox at 1 site/s.   Left Mid-Trapezius - 15 units of Botox at 1 site/s.      Right lateral Trapezius - 30 units of Botox at 1 site.  Left lateral Trapezius - 10 units of Botox at 1 site.      Right Splenius Cervicis - 30 units of Botox at 1 site/s.   Left Splenius Cervicis - 15 units of Botox at 1 site/s.      Right Levator Scapulae - 25 units of Botox at 1 site/s (shoulder muscles).   Left Levator Scapulae - 10 units of Botox at 1 site/s (shoulder muscles).      Right Longissimus Capitis - 5 units of Botox at 1 site/s.       Right Inferior Obliquus Capitis - 5 units of Botox at 1 site/s.                  Right Sternocleidomastoid - 20 units of Botox at 1 site/s.               Left Sternocleidomastoid - 10 units of Botox at 1 site/s.       RESPONSE  TO PROCEDURE:  Kendra Vaca tolerated the procedure well and there were no immediate complications. She was allowed to recover for an appropriate period of time and was discharged home in stable condition.    FOLLOW UP:  Kendra Vaca was asked to follow up by phone in 7-14 days with Laura Lebron PT, Care Coordinator or Anita Howell RN, Care Coordinator, to report her response to this series of injections.  Based on the patient's previous response to this therapy, Kendra Vaca was rescheduled for the next series of injections in 12 weeks.    PLAN (Medication Changes, Therapy Orders, Work or Disability Issues, etc.): Will monitor and report response to today's injections.    Again, thank you for allowing me to participate in the care of your patient.      Sincerely,    Virginia Rosas MD

## 2019-12-04 NOTE — PROGRESS NOTES
"BOTULINUM TOXIN PROCEDURE NOTE    Chief Complaint   Patient presents with     Dystonia     [facility] RETURN BOTOX 200 U CERVICAL DYSTONIA TOPICAL LIDOCAINE CREAM & ETHYL-CHLORIDE SPRAY      BP (!) 151/90 (BP Location: Left arm, Patient Position: Chair, Cuff Size: Adult Regular)   Pulse 78   Temp 98.1  F (36.7  C) (Oral)   Resp 16   Ht 1.6 m (5' 3\")   Wt 75.3 kg (166 lb)   LMP 02/15/2012   SpO2 95%   BMI 29.41 kg/m      Current Outpatient Medications:      acetaminophen (TYLENOL) 500 MG tablet, Take 500-1,000 mg by mouth every 6 hours as needed for mild pain (can take 4 per day), Disp: , Rfl:      azelastine (OPTIVAR) 0.05 % SOLN, Place 1 drop into both eyes as needed , Disp: , Rfl:      EPINEPHrine (EPIPEN JR) 0.15 MG/0.3ML injection, Inject 0.15 mg into the muscle as needed for anaphylaxis, Disp: , Rfl:      ketoconazole (NIZORAL) 2 % external cream, , Disp: , Rfl:      penciclovir (DENAVIR) 1 % cream, Apply 1 g topically every 2 hours, Disp: , Rfl:      Allergies   Allergen Reactions     Morphine Hives     Morphine Sulfate Hives     Seafood Hives     Latex      CONTACT RASH WITH LATEX PRODUCTS IN THE HEAT OF SUMMER        PHYSICAL EXAM:  HEAD, NECK AND TRUNK PATTERN:   Continuous multidirectional tremor  Right rotation  Left side bending  Slightly decreased ROM with head turning to left  Pt reports excessive pain at right neck today.    HPI:     Patient reports the following new medical problems since last visit: ER visit for excessive right thigh pain.  Treated with Flexeril. Resolved.    We reviewed the recommended safety guidelines for  Botox from any vaccine injection, such as the seasonal flu vaccine, by a minimum of 10-14 days with Kendra MARLEY Nola. She acknowledged understanding.    RESPONSE TO PREVIOUS TREATMENT:    Kendra Vaca received 200 units of Botox on 8/20/2019.    Problems following the previous series of neurotoxin injections included:  No problems reported    BENEFITS BY " PATIENT REPORT:  Pain Improvement: Yes.  Percent Improvement: 90%  Duration of Benefit: 10 weeks followed by a gradual reduction in benefit.      Tremors and dystonia Improvement: Yes.  Percent Improvement: 90% Duration of Benefit: 10 weeks followed by a gradual reduction in benefit. More noticeable pain and tremors over the last week.       BOTULINUM NEUROTOXIN INJECTION PROCEDURES:    VERIFICATION OF PATIENT IDENTIFICATION AND PROCEDURE     Initials   Patient Name lmd   Patient  lmd   Procedure Verified by: lmd     Prior to the start of the procedure and with procedural staff participation, I verbally confirmed the patient s identity using two indicators, relevant allergies, that the procedure was appropriate and matched the consent or emergent situation, and that the correct equipment/implants were available. Immediately prior to starting the procedure I conducted the Time Out with the procedural staff and re-confirmed the patient s name, procedure, and site/side. (The Joint Commission universal protocol was followed.)  Yes    Sedation (Moderate or Deep): None    Above assessments performed by:  Anita Davidson RN Care Coordinator    Virginia Rosas MD    INDICATION/S FOR PROCEDURE/S:  Kendra Vaca is a 59-year-old patient with dystonia affecting the head, neck and shoulder girdle musculature secondary to a diagnosis of cervical and segmental dystonia with associated pain, tremor, loss of joint motion, loss of volitional motor control and difficulty with activities of daily living.       Her baseline symptoms have been recalcitrant to oral medications and conservative therapy. She is here today for re-evaluation and it was decided to proceed with re-injection of Botox    TOTAL DOSE ADMINISTERED:  Dose Administered:  200 units Botox    Diluent Used:  Preservative Free Normal Saline  Total Volume of Diluent Used:  2.0 ml  Lot # /C3 with Expiration Date:  2022  NDC #: Botox 100u  ([address]-22)    Medication guide was offered to patient and was declined.    CONSENT:  The risks, benefits, and treatment options were discussed with Kendra Vaca and she agreed to proceed.      Written consent was obtained by lmd.     EQUIPMENT USED:  Needle-37mm stimulating/recording  EMG/NCS Machine      SKIN PREPARATION:  Skin preparation was performed using an alcohol wipe.  Ethyl Chloride spray was used at injection sites for pain control.       GUIDANCE DESCRIPTION:  Electro-myographic guidance was necessary throughout the procedure to accurately identify all areas of dystonic muscles while avoiding injection of non-dystonic muscles, neighboring nerves and nearby vascular structures.     AREA/MUSCLE INJECTED:  200 UNITS BOTOX = TOTAL DOSE, 1:1 DILUTION     1. HEAD & NECK MUSCLES: Total dose = 200 units Botox, 1:1 Dilution     Right Mid-Trapezius - 25 units of Botox at 1 site/s.   Left Mid-Trapezius - 15 units of Botox at 1 site/s.      Right lateral Trapezius - 30 units of Botox at 1 site.  Left lateral Trapezius - 10 units of Botox at 1 site.      Right Splenius Cervicis - 30 units of Botox at 1 site/s.   Left Splenius Cervicis - 15 units of Botox at 1 site/s.      Right Levator Scapulae - 25 units of Botox at 1 site/s (shoulder muscles).   Left Levator Scapulae - 10 units of Botox at 1 site/s (shoulder muscles).      Right Longissimus Capitis - 5 units of Botox at 1 site/s.       Right Inferior Obliquus Capitis - 5 units of Botox at 1 site/s.                  Right Sternocleidomastoid - 20 units of Botox at 1 site/s.               Left Sternocleidomastoid - 10 units of Botox at 1 site/s.       RESPONSE TO PROCEDURE:  Kendra Vaca tolerated the procedure well and there were no immediate complications. She was allowed to recover for an appropriate period of time and was discharged home in stable condition.    FOLLOW UP:  Kendra Vaca was asked to follow up by phone in 7-14 days with  Laura Lebron, PT, Care Coordinator or Anita Howell RN, Care Coordinator, to report her response to this series of injections.  Based on the patient's previous response to this therapy, Kendra Vaca was rescheduled for the next series of injections in 12 weeks.    PLAN (Medication Changes, Therapy Orders, Work or Disability Issues, etc.): Will monitor and report response to today's injections.

## 2020-02-27 ENCOUNTER — OFFICE VISIT (OUTPATIENT)
Dept: PHYSICAL MEDICINE AND REHAB | Facility: CLINIC | Age: 60
End: 2020-02-27
Payer: MEDICARE

## 2020-02-27 VITALS
DIASTOLIC BLOOD PRESSURE: 71 MMHG | RESPIRATION RATE: 16 BRPM | SYSTOLIC BLOOD PRESSURE: 115 MMHG | OXYGEN SATURATION: 97 % | HEART RATE: 67 BPM

## 2020-02-27 DIAGNOSIS — G24.3 SPASMODIC TORTICOLLIS: Primary | ICD-10-CM

## 2020-02-27 DIAGNOSIS — G24.1 GENETIC TORSION DYSTONIA: ICD-10-CM

## 2020-02-27 NOTE — LETTER
2/27/2020       RE: Kendra Vaca  [address] [place] [address]     Dear Colleague,    Thank you for referring your patient, Kendra Vaca, to the [facility] PHYSICAL MEDICINE AND REHABILITATION at [facility]. Please see a copy of my visit note below.    BOTULINUM TOXIN PROCEDURE NOTE    Chief Complaint   Patient presents with     Dystonia     [facility] RETURN BOTOX CERVICAL DYSTONIA      /71   Pulse 67   Resp 16   LMP 02/15/2012   SpO2 97%     Current Outpatient Medications:      acetaminophen (TYLENOL) 500 MG tablet, Take 500-1,000 mg by mouth every 6 hours as needed for mild pain (can take 4 per day), Disp: , Rfl:      azelastine (OPTIVAR) 0.05 % SOLN, Place 1 drop into both eyes as needed , Disp: , Rfl:      EPINEPHrine (EPIPEN JR) 0.15 MG/0.3ML injection, Inject 0.15 mg into the muscle as needed for anaphylaxis, Disp: , Rfl:      ketoconazole (NIZORAL) 2 % external cream, , Disp: , Rfl:      penciclovir (DENAVIR) 1 % cream, Apply 1 g topically every 2 hours, Disp: , Rfl:      Allergies   Allergen Reactions     Morphine Hives     Morphine Sulfate Hives     Seafood Hives     Latex      CONTACT RASH WITH LATEX PRODUCTS IN THE HEAT OF SUMMER        PHYSICAL EXAM:  HEAD, NECK AND TRUNK PATTERN:   Continuous multidirectional tremor  Right rotation  Left side bending  Slightly decreased ROM with head turning to left  Pt reports excessive pain at right neck today.    HPI:    Patient denies new medical diagnoses, illnesses, hospitalizations, emergency room visits, and injuries since the previous injection with botulinum neurotoxin.    We reviewed the recommended safety guidelines for  Botox from any vaccine injection, such as the seasonal flu vaccine, by a minimum of 10-14 days with Kendra Vaca. She acknowledged understanding.    RESPONSE TO PREVIOUS TREATMENT:    Kendra Vaca received 200 units of Botox on 12/4/2019.    Problems  following the previous series of neurotoxin injections included:  No problems reported    BENEFITS BY PATIENT REPORT:  Pain Improvement: Yes.  Percent Improvement: 90%  Duration of Benefit: 10 weeks followed by a gradual reduction in benefit.      Tremors and dystonia Improvement: Yes.  Percent Improvement: 90% Duration of Benefit: 10 weeks followed by a gradual reduction in benefit. More noticeable pain and tremors over the last week.       BOTULINUM NEUROTOXIN INJECTION PROCEDURES:    VERIFICATION OF PATIENT IDENTIFICATION AND PROCEDURE     Initials   Patient Name lmd   Patient  lmd   Procedure Verified by: lmd     Prior to the start of the procedure and with procedural staff participation, I verbally confirmed the patient s identity using two indicators, relevant allergies, that the procedure was appropriate and matched the consent or emergent situation, and that the correct equipment/implants were available. Immediately prior to starting the procedure I conducted the Time Out with the procedural staff and re-confirmed the patient s name, procedure, and site/side. (The Joint Commission universal protocol was followed.)  Yes    Sedation (Moderate or Deep): None      Above assessments performed by:    Anita Davidson RN Care Coordinator    Vigrinia Rosas MD      INDICATION/S FOR PROCEDURE/S:  Kendra Vaca is a 59 year old patient with dystonia affecting the  head, neck and shoulder girdle musculature secondary to a diagnosis of spasmodic torticollis with associated  pain, tremor, spasms, loss of joint motion, loss of volitional motor control and difficulty with activities of daily living.     Her baseline symptoms have been recalcitrant to oral medications and conservative therapy.  She is here today for an injection of Botox.      GOAL OF PROCEDURE:  The goal of this procedure is to increase active range of motion, improve volitional motor control, decrease pain  and enhance functional independence  associated with dystonic movements.    TOTAL DOSE ADMINISTERED:  Dose Administered:  200 units Botox    Diluent Used:  Preservative Free Normal Saline  Total Volume of Diluent Used:  2 ml  Lot # /C3 with Expiration Date:  8/2022  NDC #: Botox 100u ([address]-47)    Medication guide was offered to patient and was declined.    CONSENT:  The risks, benefits, and treatment options were discussed with Kendra Vaca and she agreed to proceed.      Written consent was obtained by [facility].     EQUIPMENT USED:  Needle-37mm stimulating/recording  EMG/NCS Machine    SKIN PREPARATION:  Skin preparation was performed using an alcohol wipe.    GUIDANCE DESCRIPTION:  Electro-myographic guidance was necessary throughout the procedure to accurately identify all areas of dystonic muscles while avoiding injection of non-dystonic muscles, neighboring nerves and nearby vascular structures.     AREA/MUSCLE INJECTED:  200 UNITS BOTOX = TOTAL DOSE    1. HEAD & NECK MUSCLES: Total dose = 200 units Botox, 1:1 Dilution      Right Mid-Trapezius - 25 units of Botox at 1 site/s.   Left Mid-Trapezius - 15 units of Botox at 1 site/s.      Right lateral Trapezius - 30 units of Botox at 1 site.  Left lateral Trapezius - 10 units of Botox at 1 site.      Right Splenius Cervicis - 30 units of Botox at 1 site/s.   Left Splenius Cervicis - 15 units of Botox at 1 site/s.      Right Levator Scapulae - 25 units of Botox at 1 site/s (shoulder muscles).   Left Levator Scapulae - 10 units of Botox at 1 site/s (shoulder muscles).      Right Longissimus Capitis - 5 units of Botox at 1 site/s.       Right Inferior Obliquus Capitis - 5 units of Botox at 1 site/s.                  Right Sternocleidomastoid - 20 units of Botox at 1 site/s.               Left Sternocleidomastoid - 10 units of Botox at 1 site/s.       RESPONSE TO PROCEDURE:  Kendra Vaca tolerated the procedure well and there were no immediate complications.  She was allowed to recover  for an appropriate period of time and was discharged home in stable condition.    FOLLOW UP:  Kendra aVca was asked to follow up by phone in 7-14 days with Anita Howell RN, Care Coordinator, to report her response to this series of injections.  Based on the patient's previous response to this therapy, Kendra Vaca was rescheduled for the next series of injections in 12 weeks.    PLAN (Medication Changes, Therapy Orders, Work or Disability Issues, etc.): Patient will monitor her response to today's injections and report.         Virginia Rosas MD

## 2020-02-27 NOTE — NURSING NOTE
Chief Complaint   Patient presents with     Dystonia     [facility] RETURN BOTOX CERVICAL DYSTONIA        Angel Vale, EMT

## 2020-02-27 NOTE — PROGRESS NOTES
BOTULINUM TOXIN PROCEDURE NOTE    Chief Complaint   Patient presents with     Dystonia     [facility] RETURN BOTOX CERVICAL DYSTONIA      /71   Pulse 67   Resp 16   LMP 02/15/2012   SpO2 97%     Current Outpatient Medications:      acetaminophen (TYLENOL) 500 MG tablet, Take 500-1,000 mg by mouth every 6 hours as needed for mild pain (can take 4 per day), Disp: , Rfl:      azelastine (OPTIVAR) 0.05 % SOLN, Place 1 drop into both eyes as needed , Disp: , Rfl:      EPINEPHrine (EPIPEN JR) 0.15 MG/0.3ML injection, Inject 0.15 mg into the muscle as needed for anaphylaxis, Disp: , Rfl:      ketoconazole (NIZORAL) 2 % external cream, , Disp: , Rfl:      penciclovir (DENAVIR) 1 % cream, Apply 1 g topically every 2 hours, Disp: , Rfl:      Allergies   Allergen Reactions     Morphine Hives     Morphine Sulfate Hives     Seafood Hives     Latex      CONTACT RASH WITH LATEX PRODUCTS IN THE HEAT OF SUMMER        PHYSICAL EXAM:  HEAD, NECK AND TRUNK PATTERN:   Continuous multidirectional tremor  Right rotation  Left side bending  Slightly decreased ROM with head turning to left  Pt reports excessive pain at right neck today.    HPI:    Patient denies new medical diagnoses, illnesses, hospitalizations, emergency room visits, and injuries since the previous injection with botulinum neurotoxin.    We reviewed the recommended safety guidelines for  Botox from any vaccine injection, such as the seasonal flu vaccine, by a minimum of 10-14 days with Kendra Vaca. She acknowledged understanding.    RESPONSE TO PREVIOUS TREATMENT:    Kendra Vaca received 200 units of Botox on 12/4/2019.    Problems following the previous series of neurotoxin injections included:  No problems reported    BENEFITS BY PATIENT REPORT:  Pain Improvement: Yes.  Percent Improvement: 90%  Duration of Benefit: 10 weeks followed by a gradual reduction in benefit.      Tremors and dystonia Improvement: Yes.  Percent Improvement: 90%  Duration of Benefit: 10 weeks followed by a gradual reduction in benefit. More noticeable pain and tremors over the last week.       BOTULINUM NEUROTOXIN INJECTION PROCEDURES:    VERIFICATION OF PATIENT IDENTIFICATION AND PROCEDURE     Initials   Patient Name lmd   Patient  lmd   Procedure Verified by: shanika     Prior to the start of the procedure and with procedural staff participation, I verbally confirmed the patient s identity using two indicators, relevant allergies, that the procedure was appropriate and matched the consent or emergent situation, and that the correct equipment/implants were available. Immediately prior to starting the procedure I conducted the Time Out with the procedural staff and re-confirmed the patient s name, procedure, and site/side. (The Joint Commission universal protocol was followed.)  Yes    Sedation (Moderate or Deep): None      Above assessments performed by:    Anita Davidson RN Care Coordinator    Virginia Rosas MD      INDICATION/S FOR PROCEDURE/S:  Kendra Vaca is a 59 year old patient with dystonia affecting the  head, neck and shoulder girdle musculature secondary to a diagnosis of spasmodic torticollis with associated  pain, tremor, spasms, loss of joint motion, loss of volitional motor control and difficulty with activities of daily living.     Her baseline symptoms have been recalcitrant to oral medications and conservative therapy.  She is here today for an injection of Botox.      GOAL OF PROCEDURE:  The goal of this procedure is to increase active range of motion, improve volitional motor control, decrease pain  and enhance functional independence associated with dystonic movements.    TOTAL DOSE ADMINISTERED:  Dose Administered:  200 units Botox    Diluent Used:  Preservative Free Normal Saline  Total Volume of Diluent Used:  2 ml  Lot # /C3 with Expiration Date:  2022  NDC #: Botox 100u ([address]-10)    Medication guide was offered to patient  and was declined.    CONSENT:  The risks, benefits, and treatment options were discussed with Kendra Vaca and she agreed to proceed.      Written consent was obtained by [facility].     EQUIPMENT USED:  Needle-37mm stimulating/recording  EMG/NCS Machine    SKIN PREPARATION:  Skin preparation was performed using an alcohol wipe.    GUIDANCE DESCRIPTION:  Electro-myographic guidance was necessary throughout the procedure to accurately identify all areas of dystonic muscles while avoiding injection of non-dystonic muscles, neighboring nerves and nearby vascular structures.     AREA/MUSCLE INJECTED:  200 UNITS BOTOX = TOTAL DOSE    1. HEAD & NECK MUSCLES: Total dose = 200 units Botox, 1:1 Dilution      Right Mid-Trapezius - 25 units of Botox at 1 site/s.   Left Mid-Trapezius - 15 units of Botox at 1 site/s.      Right lateral Trapezius - 30 units of Botox at 1 site.  Left lateral Trapezius - 10 units of Botox at 1 site.      Right Splenius Cervicis - 30 units of Botox at 1 site/s.   Left Splenius Cervicis - 15 units of Botox at 1 site/s.      Right Levator Scapulae - 25 units of Botox at 1 site/s (shoulder muscles).   Left Levator Scapulae - 10 units of Botox at 1 site/s (shoulder muscles).      Right Longissimus Capitis - 5 units of Botox at 1 site/s.       Right Inferior Obliquus Capitis - 5 units of Botox at 1 site/s.                  Right Sternocleidomastoid - 20 units of Botox at 1 site/s.               Left Sternocleidomastoid - 10 units of Botox at 1 site/s.       RESPONSE TO PROCEDURE:  Kendra Vaca tolerated the procedure well and there were no immediate complications.  She was allowed to recover for an appropriate period of time and was discharged home in stable condition.    FOLLOW UP:  Kendra Vaca was asked to follow up by phone in 7-14 days with Anita Howell RN, Care Coordinator, to report her response to this series of injections.  Based on the patient's previous response to this  therapy, Kendra Vaca was rescheduled for the next series of injections in 12 weeks.    PLAN (Medication Changes, Therapy Orders, Work or Disability Issues, etc.): Patient will monitor her response to today's injections and report.

## 2020-03-10 ENCOUNTER — OFFICE VISIT - HEALTHEAST (OUTPATIENT)
Dept: FAMILY MEDICINE | Facility: CLINIC | Age: 60
End: 2020-03-10

## 2020-03-10 DIAGNOSIS — M54.50 ACUTE RIGHT-SIDED LOW BACK PAIN WITHOUT SCIATICA: ICD-10-CM

## 2020-03-10 DIAGNOSIS — R30.0 DYSURIA: ICD-10-CM

## 2020-03-10 DIAGNOSIS — R32 URINARY INCONTINENCE, UNSPECIFIED TYPE: ICD-10-CM

## 2020-03-10 LAB
ALBUMIN UR-MCNC: NEGATIVE MG/DL
APPEARANCE UR: CLEAR
BILIRUB UR QL STRIP: NEGATIVE
COLOR UR AUTO: YELLOW
GLUCOSE UR STRIP-MCNC: NEGATIVE MG/DL
HGB UR QL STRIP: ABNORMAL
KETONES UR STRIP-MCNC: NEGATIVE MG/DL
LEUKOCYTE ESTERASE UR QL STRIP: NEGATIVE
NITRATE UR QL: NEGATIVE
PH UR STRIP: 5 [PH] (ref 5–8)
SP GR UR STRIP: 1.02 (ref 1–1.03)
UROBILINOGEN UR STRIP-ACNC: ABNORMAL

## 2020-03-11 ENCOUNTER — AMBULATORY - HEALTHEAST (OUTPATIENT)
Dept: FAMILY MEDICINE | Facility: CLINIC | Age: 60
End: 2020-03-11

## 2020-03-11 DIAGNOSIS — R31.9 HEMATURIA, UNSPECIFIED TYPE: ICD-10-CM

## 2020-03-11 DIAGNOSIS — N39.44 NOCTURNAL ENURESIS: ICD-10-CM

## 2020-03-11 DIAGNOSIS — N39.3 FEMALE STRESS INCONTINENCE: ICD-10-CM

## 2020-03-11 LAB — BACTERIA SPEC CULT: NO GROWTH

## 2020-03-12 ENCOUNTER — COMMUNICATION - HEALTHEAST (OUTPATIENT)
Dept: FAMILY MEDICINE | Facility: CLINIC | Age: 60
End: 2020-03-12

## 2020-03-30 ENCOUNTER — RECORDS - HEALTHEAST (OUTPATIENT)
Dept: ADMINISTRATIVE | Facility: OTHER | Age: 60
End: 2020-03-30

## 2020-04-09 ENCOUNTER — COMMUNICATION - HEALTHEAST (OUTPATIENT)
Dept: FAMILY MEDICINE | Facility: CLINIC | Age: 60
End: 2020-04-09

## 2020-04-09 DIAGNOSIS — L30.9 DERMATITIS: ICD-10-CM

## 2020-04-09 DIAGNOSIS — B37.89 CANDIDIASIS OF BREAST: ICD-10-CM

## 2020-05-21 ENCOUNTER — OFFICE VISIT (OUTPATIENT)
Dept: PHYSICAL MEDICINE AND REHAB | Facility: CLINIC | Age: 60
End: 2020-05-21
Payer: MEDICARE

## 2020-05-21 VITALS — TEMPERATURE: 97.9 F

## 2020-05-21 DIAGNOSIS — G24.1 GENETIC TORSION DYSTONIA: ICD-10-CM

## 2020-05-21 DIAGNOSIS — G24.3 SPASMODIC TORTICOLLIS: Primary | ICD-10-CM

## 2020-05-21 NOTE — PROGRESS NOTES
"BOTULINUM TOXIN PROCEDURE NOTE    No chief complaint on file.    Temp 97.9  F (36.6  C) (Oral)   LMP 02/15/2012     Current Outpatient Medications:      acetaminophen (TYLENOL) 500 MG tablet, Take 500-1,000 mg by mouth every 6 hours as needed for mild pain (can take 4 per day), Disp: , Rfl:      azelastine (OPTIVAR) 0.05 % SOLN, Place 1 drop into both eyes as needed , Disp: , Rfl:      EPINEPHrine (EPIPEN JR) 0.15 MG/0.3ML injection, Inject 0.15 mg into the muscle as needed for anaphylaxis, Disp: , Rfl:      ketoconazole (NIZORAL) 2 % external cream, , Disp: , Rfl:      penciclovir (DENAVIR) 1 % cream, Apply 1 g topically every 2 hours, Disp: , Rfl:   No current facility-administered medications for this visit.      Allergies   Allergen Reactions     Morphine Hives     Morphine Sulfate Hives     Seafood Hives     Latex      CONTACT RASH WITH LATEX PRODUCTS IN THE HEAT OF SUMMER        PHYSICAL EXAM:  HEAD, NECK AND TRUNK PATTERN:   Continuous multidirectional tremor  Right rotation  Left side bending  Slightly decreased ROM with head turning to left  Pt reports excessive pain at right neck today.    HPI:    Patient denies new medical diagnoses, illnesses, hospitalizations, emergency room visits, and injuries since the previous injection with botulinum neurotoxin.     Per chart review, she was seen in ED 3/11 for \"subjective throat swelling after taking cipro\" which was treated with antihistamine meds. She was discharged home the same day.     We reviewed the recommended safety guidelines for  Botox from any vaccine injection, such as the seasonal flu vaccine, by a minimum of 10-14 days with Kendra Vaca. She acknowledged understanding.      RESPONSE TO PREVIOUS TREATMENT:  Kendra Vaca received 200 units of Botox on 2/27/2020.    Problems following the previous series of neurotoxin injections included:  No problems reported  Slight fatigue.        BENEFITS BY PATIENT REPORT:  Pain " Improvement: Yes.  Percent Improvement: 90%  Duration of Benefit: 10 weeks followed by a gradual reduction in benefit.      Tremors and dystonia Improvement: Yes.  Percent Improvement: 90% Duration of Benefit: 10 weeks followed by a gradual reduction in benefit. More noticeable pain and tremors over the last week.         BOTULINUM NEUROTOXIN INJECTION PROCEDURES:    VERIFICATION OF PATIENT IDENTIFICATION AND PROCEDURE     Initials   Patient Name lmd   Patient  lmd   Procedure Verified by: lmd     Prior to the start of the procedure and with procedural staff participation, I verbally confirmed the patient s identity using two indicators, relevant allergies, that the procedure was appropriate and matched the consent or emergent situation, and that the correct equipment/implants were available. Immediately prior to starting the procedure I conducted the Time Out with the procedural staff and re-confirmed the patient s name, procedure, and site/side. (The Joint Commission universal protocol was followed.)  Yes    Sedation (Moderate or Deep): None      Above assessments performed by:  Anita Davidson RN Care Coordinator    Carissa Agrawal MD      INDICATION/S FOR PROCEDURE/S:  Kendra Vaca is a 59 year old patient with dystonia affecting the  head, neck and shoulder girdle musculature secondary to a diagnosis of spasmodic torticollis with associated  pain, tremor, spasms, loss of joint motion, loss of volitional motor control and difficulty with activities of daily living.     Her baseline symptoms have been recalcitrant to oral medications and conservative therapy.  She is here today for an injection of Botox.        GOAL OF PROCEDURE:  The goal of this procedure is to increase active range of motion, improve volitional motor control, decrease pain  and enhance functional independence associated with dystonic movements.      TOTAL DOSE ADMINISTERED:  Dose Administered:  200 units Botox    Diluent Used:   Preservative Free Normal Saline  Total Volume of Diluent Used:  2 ml  Lot # /C3 with Expiration Date:  10/2022  NDC #: Botox 100u ([address]-31)    Medication guide was offered to patient and was declined.    CONSENT:  The risks, benefits, and treatment options were discussed with Kendra Vaca and she agreed to proceed.      Written consent was obtained by [facility].     EQUIPMENT USED:  Needle-37mm stimulating/recording  EMG/NCS Machine    SKIN PREPARATION:  Skin preparation was performed using an alcohol wipe.    GUIDANCE DESCRIPTION:  Electro-myographic guidance was necessary throughout the procedure to accurately identify all areas of dystonic muscles while avoiding injection of non-dystonic muscles, neighboring nerves and nearby vascular structures.       AREA/MUSCLE INJECTED:  200 UNITS BOTOX = TOTAL DOSE    1. HEAD & NECK MUSCLES: Total dose = 200 units Botox, 1:1 Dilution      Right Mid-Trapezius - 25 units of Botox at 1 site/s.   Left Mid-Trapezius - 15 units of Botox at 1 site/s.      Right lateral Trapezius - 30 units of Botox at 1 site.  Left lateral Trapezius - 10 units of Botox at 1 site.      Right Splenius Cervicis - 30 units of Botox at 1 site/s.   Left Splenius Cervicis - 15 units of Botox at 1 site/s.      Right Levator Scapulae - 25 units of Botox at 1 site/s (shoulder muscles).   Left Levator Scapulae - 10 units of Botox at 1 site/s (shoulder muscles).      Right Longissimus Capitis - 5 units of Botox at 1 site/s.       Right Inferior Obliquus Capitis - 5 units of Botox at 1 site/s.                  Right Sternocleidomastoid - 20 units of Botox at 1 site/s.               Left Sternocleidomastoid - 10 units of Botox at 1 site/s.       RESPONSE TO PROCEDURE:  Kendra Vaca tolerated the procedure well and there were no immediate complications.  She was allowed to recover for an appropriate period of time and was discharged home in stable condition.    FOLLOW UP:  Kendra Vaca  was asked to follow up by phone in 7-14 days with Gissel Holt PT or Anita Howell RN, Care Coordinator, to report her response to this series of injections.  Based on the patient's previous response to this therapy, Kendra Vaca was rescheduled for the next series of injections in 12 weeks.    PLAN (Medication Changes, Therapy Orders, Work or Disability Issues, etc.): Patient will monitor her response to today's injections and report.       No change in the total dose or sites of injections today.     Carissa Agrawal MD  Physical Medicine & Rehabilitation

## 2020-05-21 NOTE — LETTER
"5/21/2020       RE: Kendra Vaca  [address] [place] [address]     Dear Colleague,    Thank you for referring your patient, Kendra Vaca, to the [facility] PHYSICAL MEDICINE AND REHABILITATION at [facility]. Please see a copy of my visit note below.    BOTULINUM TOXIN PROCEDURE NOTE    No chief complaint on file.    Temp 97.9  F (36.6  C) (Oral)   LMP 02/15/2012     Current Outpatient Medications:      acetaminophen (TYLENOL) 500 MG tablet, Take 500-1,000 mg by mouth every 6 hours as needed for mild pain (can take 4 per day), Disp: , Rfl:      azelastine (OPTIVAR) 0.05 % SOLN, Place 1 drop into both eyes as needed , Disp: , Rfl:      EPINEPHrine (EPIPEN JR) 0.15 MG/0.3ML injection, Inject 0.15 mg into the muscle as needed for anaphylaxis, Disp: , Rfl:      ketoconazole (NIZORAL) 2 % external cream, , Disp: , Rfl:      penciclovir (DENAVIR) 1 % cream, Apply 1 g topically every 2 hours, Disp: , Rfl:   No current facility-administered medications for this visit.      Allergies   Allergen Reactions     Morphine Hives     Morphine Sulfate Hives     Seafood Hives     Latex      CONTACT RASH WITH LATEX PRODUCTS IN THE HEAT OF SUMMER        PHYSICAL EXAM:  HEAD, NECK AND TRUNK PATTERN:   Continuous multidirectional tremor  Right rotation  Left side bending  Slightly decreased ROM with head turning to left  Pt reports excessive pain at right neck today.    HPI:    Patient denies new medical diagnoses, illnesses, hospitalizations, emergency room visits, and injuries since the previous injection with botulinum neurotoxin.     Per chart review, she was seen in ED 3/11 for \"subjective throat swelling after taking cipro\" which was treated with antihistamine meds. She was discharged home the same day.     We reviewed the recommended safety guidelines for  Botox from any vaccine injection, such as the seasonal flu vaccine, by a minimum of 10-14 days with Kendra " DONELL Vaca. She acknowledged understanding.      RESPONSE TO PREVIOUS TREATMENT:  Kendra Vaca received 200 units of Botox on 2020.    Problems following the previous series of neurotoxin injections included:  No problems reported  Slight fatigue.        BENEFITS BY PATIENT REPORT:  Pain Improvement: Yes.  Percent Improvement: 90%  Duration of Benefit: 10 weeks followed by a gradual reduction in benefit.      Tremors and dystonia Improvement: Yes.  Percent Improvement: 90% Duration of Benefit: 10 weeks followed by a gradual reduction in benefit. More noticeable pain and tremors over the last week.         BOTULINUM NEUROTOXIN INJECTION PROCEDURES:    VERIFICATION OF PATIENT IDENTIFICATION AND PROCEDURE     Initials   Patient Name lmd   Patient  lmd   Procedure Verified by: lmd     Prior to the start of the procedure and with procedural staff participation, I verbally confirmed the patient s identity using two indicators, relevant allergies, that the procedure was appropriate and matched the consent or emergent situation, and that the correct equipment/implants were available. Immediately prior to starting the procedure I conducted the Time Out with the procedural staff and re-confirmed the patient s name, procedure, and site/side. (The Joint Commission universal protocol was followed.)  Yes    Sedation (Moderate or Deep): None      Above assessments performed by:  Anita Davidson RN Care Coordinator    Carissa Agrawal MD      INDICATION/S FOR PROCEDURE/S:  Kendra Vaca is a 59 year old patient with dystonia affecting the  head, neck and shoulder girdle musculature secondary to a diagnosis of spasmodic torticollis with associated  pain, tremor, spasms, loss of joint motion, loss of volitional motor control and difficulty with activities of daily living.     Her baseline symptoms have been recalcitrant to oral medications and conservative therapy.  She is here today for an injection of Botox.         GOAL OF PROCEDURE:  The goal of this procedure is to increase active range of motion, improve volitional motor control, decrease pain  and enhance functional independence associated with dystonic movements.      TOTAL DOSE ADMINISTERED:  Dose Administered:  200 units Botox    Diluent Used:  Preservative Free Normal Saline  Total Volume of Diluent Used:  2 ml  Lot # /C3 with Expiration Date:  10/2022  NDC #: Botox 100u ([id number])    Medication guide was offered to patient and was declined.    CONSENT:  The risks, benefits, and treatment options were discussed with Kendra De Leónbartolo and she agreed to proceed.      Written consent was obtained by [facility].     EQUIPMENT USED:  Needle-37mm stimulating/recording  EMG/NCS Machine    SKIN PREPARATION:  Skin preparation was performed using an alcohol wipe.    GUIDANCE DESCRIPTION:  Electro-myographic guidance was necessary throughout the procedure to accurately identify all areas of dystonic muscles while avoiding injection of non-dystonic muscles, neighboring nerves and nearby vascular structures.       AREA/MUSCLE INJECTED:  200 UNITS BOTOX = TOTAL DOSE    1. HEAD & NECK MUSCLES: Total dose = 200 units Botox, 1:1 Dilution      Right Mid-Trapezius - 25 units of Botox at 1 site/s.   Left Mid-Trapezius - 15 units of Botox at 1 site/s.      Right lateral Trapezius - 30 units of Botox at 1 site.  Left lateral Trapezius - 10 units of Botox at 1 site.      Right Splenius Cervicis - 30 units of Botox at 1 site/s.   Left Splenius Cervicis - 15 units of Botox at 1 site/s.      Right Levator Scapulae - 25 units of Botox at 1 site/s (shoulder muscles).   Left Levator Scapulae - 10 units of Botox at 1 site/s (shoulder muscles).      Right Longissimus Capitis - 5 units of Botox at 1 site/s.       Right Inferior Obliquus Capitis - 5 units of Botox at 1 site/s.                  Right Sternocleidomastoid - 20 units of Botox at 1 site/s.               Left Sternocleidomastoid  - 10 units of Botox at 1 site/s.       RESPONSE TO PROCEDURE:  Kendra Vaca tolerated the procedure well and there were no immediate complications.  She was allowed to recover for an appropriate period of time and was discharged home in stable condition.    FOLLOW UP:  Kendra Vaca was asked to follow up by phone in 7-14 days with Gissel Holt PT or Anita Howell RN, Care Coordinator, to report her response to this series of injections.  Based on the patient's previous response to this therapy, Kendra Vaca was rescheduled for the next series of injections in 12 weeks.    PLAN (Medication Changes, Therapy Orders, Work or Disability Issues, etc.): Patient will monitor her response to today's injections and report.       No change in the total dose or sites of injections today.     Carissa Agrawal MD  Physical Medicine & Rehabilitation

## 2020-10-28 ENCOUNTER — OFFICE VISIT - HEALTHEAST (OUTPATIENT)
Dept: FAMILY MEDICINE | Facility: CLINIC | Age: 60
End: 2020-10-28

## 2020-10-28 ENCOUNTER — COMMUNICATION - HEALTHEAST (OUTPATIENT)
Dept: SCHEDULING | Facility: CLINIC | Age: 60
End: 2020-10-28

## 2020-10-28 DIAGNOSIS — M54.50 ACUTE BILATERAL LOW BACK PAIN WITHOUT SCIATICA: ICD-10-CM

## 2020-11-05 ENCOUNTER — COMMUNICATION - HEALTHEAST (OUTPATIENT)
Dept: FAMILY MEDICINE | Facility: CLINIC | Age: 60
End: 2020-11-05

## 2020-11-05 ENCOUNTER — AMBULATORY - HEALTHEAST (OUTPATIENT)
Dept: FAMILY MEDICINE | Facility: CLINIC | Age: 60
End: 2020-11-05

## 2020-11-05 DIAGNOSIS — M54.50 ACUTE BILATERAL LOW BACK PAIN WITHOUT SCIATICA: ICD-10-CM

## 2020-11-05 DIAGNOSIS — M54.50 ACUTE LOW BACK PAIN WITHOUT SCIATICA, UNSPECIFIED BACK PAIN LATERALITY: ICD-10-CM

## 2020-11-06 ENCOUNTER — RECORDS - HEALTHEAST (OUTPATIENT)
Dept: RADIOLOGY | Facility: CLINIC | Age: 60
End: 2020-11-06

## 2020-11-09 ENCOUNTER — HOSPITAL ENCOUNTER (OUTPATIENT)
Dept: RADIOLOGY | Facility: HOSPITAL | Age: 60
Discharge: HOME | End: 2020-11-09
Attending: PAIN MEDICINE

## 2020-11-09 ENCOUNTER — HOSPITAL ENCOUNTER (OUTPATIENT)
Dept: PHYSICAL MEDICINE AND REHAB | Facility: CLINIC | Age: 60
Discharge: HOME | End: 2020-11-09
Attending: PHYSICIAN ASSISTANT

## 2020-11-09 DIAGNOSIS — M79.18 MYOFASCIAL PAIN: ICD-10-CM

## 2020-11-09 DIAGNOSIS — M54.50 ACUTE BILATERAL LOW BACK PAIN WITHOUT SCIATICA: ICD-10-CM

## 2020-11-09 ASSESSMENT — MIFFLIN-ST. JEOR: SCORE: 1348.33

## 2020-11-10 ENCOUNTER — COMMUNICATION - HEALTHEAST (OUTPATIENT)
Dept: PHYSICAL MEDICINE AND REHAB | Facility: CLINIC | Age: 60
End: 2020-11-10

## 2020-11-10 ENCOUNTER — AMBULATORY - HEALTHEAST (OUTPATIENT)
Dept: PHYSICAL MEDICINE AND REHAB | Facility: CLINIC | Age: 60
End: 2020-11-10

## 2020-11-10 DIAGNOSIS — M54.50 ACUTE BILATERAL LOW BACK PAIN WITHOUT SCIATICA: ICD-10-CM

## 2020-11-10 DIAGNOSIS — M79.18 MYOFASCIAL PAIN: ICD-10-CM

## 2020-12-04 ENCOUNTER — OFFICE VISIT - HEALTHEAST (OUTPATIENT)
Dept: PHYSICAL THERAPY | Facility: REHABILITATION | Age: 60
End: 2020-12-04

## 2020-12-04 DIAGNOSIS — M62.81 GENERALIZED MUSCLE WEAKNESS: ICD-10-CM

## 2020-12-04 DIAGNOSIS — M54.50 ACUTE BILATERAL LOW BACK PAIN WITHOUT SCIATICA: ICD-10-CM

## 2020-12-07 ENCOUNTER — OFFICE VISIT - HEALTHEAST (OUTPATIENT)
Dept: PHYSICAL THERAPY | Facility: REHABILITATION | Age: 60
End: 2020-12-07

## 2020-12-07 DIAGNOSIS — M62.81 GENERALIZED MUSCLE WEAKNESS: ICD-10-CM

## 2020-12-07 DIAGNOSIS — M54.50 ACUTE BILATERAL LOW BACK PAIN WITHOUT SCIATICA: ICD-10-CM

## 2020-12-14 ENCOUNTER — OFFICE VISIT - HEALTHEAST (OUTPATIENT)
Dept: PHYSICAL THERAPY | Facility: REHABILITATION | Age: 60
End: 2020-12-14

## 2020-12-14 DIAGNOSIS — M62.81 GENERALIZED MUSCLE WEAKNESS: ICD-10-CM

## 2020-12-14 DIAGNOSIS — M54.50 ACUTE BILATERAL LOW BACK PAIN WITHOUT SCIATICA: ICD-10-CM

## 2020-12-21 ENCOUNTER — OFFICE VISIT - HEALTHEAST (OUTPATIENT)
Dept: PHYSICAL THERAPY | Facility: REHABILITATION | Age: 60
End: 2020-12-21

## 2020-12-21 DIAGNOSIS — M62.81 GENERALIZED MUSCLE WEAKNESS: ICD-10-CM

## 2020-12-21 DIAGNOSIS — M54.50 ACUTE BILATERAL LOW BACK PAIN WITHOUT SCIATICA: ICD-10-CM

## 2020-12-28 ENCOUNTER — OFFICE VISIT - HEALTHEAST (OUTPATIENT)
Dept: PHYSICAL THERAPY | Facility: REHABILITATION | Age: 60
End: 2020-12-28

## 2020-12-28 ENCOUNTER — AMBULATORY - HEALTHEAST (OUTPATIENT)
Dept: PHYSICAL MEDICINE AND REHAB | Facility: CLINIC | Age: 60
End: 2020-12-28

## 2020-12-28 DIAGNOSIS — M62.81 GENERALIZED MUSCLE WEAKNESS: ICD-10-CM

## 2020-12-28 DIAGNOSIS — M54.50 ACUTE BILATERAL LOW BACK PAIN WITHOUT SCIATICA: ICD-10-CM

## 2020-12-28 DIAGNOSIS — M79.18 MYOFASCIAL PAIN: ICD-10-CM

## 2021-03-23 ENCOUNTER — OFFICE VISIT (OUTPATIENT)
Dept: PHYSICAL MEDICINE AND REHAB | Facility: CLINIC | Age: 61
End: 2021-03-23
Payer: MEDICARE

## 2021-03-23 VITALS
TEMPERATURE: 97.7 F | DIASTOLIC BLOOD PRESSURE: 73 MMHG | OXYGEN SATURATION: 99 % | HEART RATE: 83 BPM | SYSTOLIC BLOOD PRESSURE: 101 MMHG

## 2021-03-23 DIAGNOSIS — G24.3 SPASMODIC TORTICOLLIS: Primary | ICD-10-CM

## 2021-03-23 DIAGNOSIS — G24.1 GENETIC TORSION DYSTONIA: ICD-10-CM

## 2021-03-23 PROCEDURE — 64642 CHEMODENERV 1 EXTREMITY 1-4: CPT | Mod: GC | Performed by: PHYSICAL MEDICINE & REHABILITATION

## 2021-03-23 PROCEDURE — 64643 CHEMODENERV 1 EXTREM 1-4 EA: CPT | Mod: GC | Performed by: PHYSICAL MEDICINE & REHABILITATION

## 2021-03-23 PROCEDURE — 64616 CHEMODENERV MUSC NECK DYSTON: CPT | Mod: 50 | Performed by: PHYSICAL MEDICINE & REHABILITATION

## 2021-03-23 PROCEDURE — 95874 GUIDE NERV DESTR NEEDLE EMG: CPT | Mod: GC | Performed by: PHYSICAL MEDICINE & REHABILITATION

## 2021-03-23 NOTE — PROGRESS NOTES
BOTULINUM TOXIN PROCEDURE NOTE    Chief Complaint   Patient presents with     RECHECK     /73   Pulse 83   Temp 97.7  F (36.5  C)   LMP 02/15/2012   SpO2 99%     Current Outpatient Medications:      acetaminophen (TYLENOL) 500 MG tablet, Take 500-1,000 mg by mouth every 6 hours as needed for mild pain (can take 4 per day), Disp: , Rfl:      azelastine (OPTIVAR) 0.05 % SOLN, Place 1 drop into both eyes as needed , Disp: , Rfl:      EPINEPHrine (EPIPEN JR) 0.15 MG/0.3ML injection, Inject 0.15 mg into the muscle as needed for anaphylaxis, Disp: , Rfl:      ketoconazole (NIZORAL) 2 % external cream, , Disp: , Rfl:      penciclovir (DENAVIR) 1 % cream, Apply 1 g topically every 2 hours, Disp: , Rfl:      Allergies   Allergen Reactions     Morphine Hives     Morphine Sulfate Hives     Seafood Hives     Latex      CONTACT RASH WITH LATEX PRODUCTS IN THE HEAT OF SUMMER        PHYSICAL EXAM:  HEAD, NECK AND TRUNK PATTERN:   Continuous multidirectional tremor  Right rotation  Left side bending  Slightly decreased ROM with head turning to left  Pt reports excessive pain at right neck.    HPI:    Patient denies new medical diagnoses, illnesses, hospitalizations, emergency room visits, and injuries since the previous injection with botulinum neurotoxin.     Had a lumbar TROY recently for back pain which helped. She missed appointment in August and is definitely feeling the effects of being overdue for her Botox. Gets 3 months of benefit with wearing off right before due. Patient reports that sleep has been quite difficult since December 2020.    We reviewed the recommended safety guidelines for  Botox from any vaccine injection, such as the seasonal flu vaccine, by a minimum of 10-14 days with Kendra MARLEY Nola. She acknowledged understanding.      RESPONSE TO PREVIOUS TREATMENT:  Kendra Vaca received 200 units of Botox on 5/21/2020.    Problems following the previous series of neurotoxin injections  included:  No problems reported  Slight fatigue.    BENEFITS BY PATIENT REPORT:  Pain Improvement: Yes.  Percent Improvement: 90%  Duration of Benefit: 11 weeks followed by a gradual reduction in benefit.      Tremors and dystonia Improvement: Yes.  Percent Improvement: 90% Duration of Benefit: 11 weeks followed by a gradual reduction in benefit. More noticeable pain and tremors over the last week.         BOTULINUM NEUROTOXIN INJECTION PROCEDURES:    VERIFICATION OF PATIENT IDENTIFICATION AND PROCEDURE     Initials   Patient Name cdm   Patient  cdm   Procedure Verified by: cdm     Prior to the start of the procedure and with procedural staff participation, I verbally confirmed the patient s identity using two indicators, relevant allergies, that the procedure was appropriate and matched the consent or emergent situation, and that the correct equipment/implants were available. Immediately prior to starting the procedure I conducted the Time Out with the procedural staff and re-confirmed the patient s name, procedure, and site/side. (The Joint Commission universal protocol was followed.)  Yes    Sedation (Moderate or Deep): None      Above assessments performed by:  Resident: Jose Francisco Ruano MD   The attending was present for the entire procedure.    Virginia Rosas MD     INDICATION/S FOR PROCEDURE/S:  Kendra Vaca is a 60 year old patient with dystonia affecting the  head, neck and shoulder girdle musculature secondary to a diagnosis of spasmodic torticollis with associated  pain, tremor, spasms, loss of joint motion, loss of volitional motor control and difficulty with activities of daily living.     Her baseline symptoms have been recalcitrant to oral medications and conservative therapy.  She is here today for an injection of Botox.        GOAL OF PROCEDURE:  The goal of this procedure is to increase active range of motion, improve volitional motor control, decrease pain  and enhance functional  independence associated with dystonic movements.      TOTAL DOSE ADMINISTERED:  Dose Administered:  200 units Botox    Diluent Used:  Preservative Free Normal Saline  Total Volume of Diluent Used:  2 ml  Lot # /C3 with Expiration Date: 10/2023  NDC #: Botox 100u ([address]-31)    Medication guide was offered to patient and was declined.    CONSENT:  The risks, benefits, and treatment options were discussed with Kendra Vaca and she agreed to proceed.      Written consent was obtained by [facility]     EQUIPMENT USED:  Needle-37mm stimulating/recording  EMG/NCS Machine    SKIN PREPARATION:  Skin preparation was performed using an alcohol wipe.    GUIDANCE DESCRIPTION:  Electro-myographic guidance was necessary throughout the procedure to accurately identify all areas of dystonic muscles while avoiding injection of non-dystonic muscles, neighboring nerves and nearby vascular structures.       AREA/MUSCLE INJECTED:  200 UNITS BOTOX = TOTAL DOSE    1. HEAD & NECK MUSCLES: Total dose = 200 units Botox, 1:1 Dilution      Right Mid-Trapezius - 25 units of Botox at 1 site/s.   Left Mid-Trapezius - 15 units of Botox at 1 site/s.      Right lateral Trapezius - 30 units of Botox at 1 site.  Left lateral Trapezius - 10 units of Botox at 1 site.      Right Splenius Cervicis - 30 units of Botox at 1 site/s.   Left Splenius Cervicis - 15 units of Botox at 1 site/s.      Right Levator Scapulae - 25 units of Botox at 1 site/s (shoulder muscles).   Left Levator Scapulae - 10 units of Botox at 1 site/s (shoulder muscles).      Right Longissimus Capitis - 5 units of Botox at 1 site/s.       Right Inferior Obliquus Capitis - 5 units of Botox at 1 site/s.                  Right Sternocleidomastoid - 20 units of Botox at 1 site/s.               Left Sternocleidomastoid - 10 units of Botox at 1 site/s.       RESPONSE TO PROCEDURE:  Kendra Vaca tolerated the procedure well and there were no immediate complications.  She was  allowed to recover for an appropriate period of time and was discharged home in stable condition.    FOLLOW UP:  Kendra Vaca was asked to follow up by phone in 7-14 days with Gissel Holt PT  to report her response to this series of injections.  Based on the patient's previous response to this therapy, Kendra Vaca was rescheduled for the next series of injections in 12 weeks.    PLAN (Medication Changes, Therapy Orders, Work or Disability Issues, etc.): Patient will monitor her response to today's injections and report.

## 2021-03-23 NOTE — LETTER
3/23/2021       RE: Kendra Vaca  [address] [place] [address]     Dear Colleague,    Thank you for referring your patient, Kendra Vaca, to the [facility] [place] [facility]. Please see a copy of my visit note below.    BOTULINUM TOXIN PROCEDURE NOTE    Chief Complaint   Patient presents with     RECHECK     /73   Pulse 83   Temp 97.7  F (36.5  C)   LMP 02/15/2012   SpO2 99%     Current Outpatient Medications:      acetaminophen (TYLENOL) 500 MG tablet, Take 500-1,000 mg by mouth every 6 hours as needed for mild pain (can take 4 per day), Disp: , Rfl:      azelastine (OPTIVAR) 0.05 % SOLN, Place 1 drop into both eyes as needed , Disp: , Rfl:      EPINEPHrine (EPIPEN JR) 0.15 MG/0.3ML injection, Inject 0.15 mg into the muscle as needed for anaphylaxis, Disp: , Rfl:      ketoconazole (NIZORAL) 2 % external cream, , Disp: , Rfl:      penciclovir (DENAVIR) 1 % cream, Apply 1 g topically every 2 hours, Disp: , Rfl:      Allergies   Allergen Reactions     Morphine Hives     Morphine Sulfate Hives     Seafood Hives     Latex      CONTACT RASH WITH LATEX PRODUCTS IN THE HEAT OF SUMMER        PHYSICAL EXAM:  HEAD, NECK AND TRUNK PATTERN:   Continuous multidirectional tremor  Right rotation  Left side bending  Slightly decreased ROM with head turning to left  Pt reports excessive pain at right neck.    HPI:    Patient denies new medical diagnoses, illnesses, hospitalizations, emergency room visits, and injuries since the previous injection with botulinum neurotoxin.     Had a lumbar TROY recently for back pain which helped. She missed appointment in August and is definitely feeling the effects of being overdue for her Botox. Gets 3 months of benefit with wearing off right before due. Patient reports that sleep has been quite difficult since December 2020.    We reviewed the recommended  safety guidelines for  Botox from any vaccine injection, such as the seasonal flu vaccine, by a minimum of 10-14 days with Kendra Vaca. She acknowledged understanding.      RESPONSE TO PREVIOUS TREATMENT:  Kendra Vaca received 200 units of Botox on 2020.    Problems following the previous series of neurotoxin injections included:  No problems reported  Slight fatigue.    BENEFITS BY PATIENT REPORT:  Pain Improvement: Yes.  Percent Improvement: 90%  Duration of Benefit: 11 weeks followed by a gradual reduction in benefit.      Tremors and dystonia Improvement: Yes.  Percent Improvement: 90% Duration of Benefit: 11 weeks followed by a gradual reduction in benefit. More noticeable pain and tremors over the last week.         BOTULINUM NEUROTOXIN INJECTION PROCEDURES:    VERIFICATION OF PATIENT IDENTIFICATION AND PROCEDURE     Initials   Patient Name cdm   Patient  cdm   Procedure Verified by: cdforeign     Prior to the start of the procedure and with procedural staff participation, I verbally confirmed the patient s identity using two indicators, relevant allergies, that the procedure was appropriate and matched the consent or emergent situation, and that the correct equipment/implants were available. Immediately prior to starting the procedure I conducted the Time Out with the procedural staff and re-confirmed the patient s name, procedure, and site/side. (The Joint Commission universal protocol was followed.)  Yes    Sedation (Moderate or Deep): None      Above assessments performed by:  Resident: Jose Francisco Ruano MD   The attending was present for the entire procedure.    Virginia Rosas MD     INDICATION/S FOR PROCEDURE/S:  Kendra Vaca is a 60 year old patient with dystonia affecting the  head, neck and shoulder girdle musculature secondary to a diagnosis of spasmodic torticollis with associated  pain, tremor, spasms, loss of joint motion, loss of volitional motor control and  difficulty with activities of daily living.     Her baseline symptoms have been recalcitrant to oral medications and conservative therapy.  She is here today for an injection of Botox.        GOAL OF PROCEDURE:  The goal of this procedure is to increase active range of motion, improve volitional motor control, decrease pain  and enhance functional independence associated with dystonic movements.      TOTAL DOSE ADMINISTERED:  Dose Administered:  200 units Botox    Diluent Used:  Preservative Free Normal Saline  Total Volume of Diluent Used:  2 ml  Lot # /C3 with Expiration Date: 10/2023  NDC #: Botox 100u ([address]-02)    Medication guide was offered to patient and was declined.    CONSENT:  The risks, benefits, and treatment options were discussed with Kendra Vaca and she agreed to proceed.      Written consent was obtained by [facility].     EQUIPMENT USED:  Needle-37mm stimulating/recording  EMG/NCS Machine    SKIN PREPARATION:  Skin preparation was performed using an alcohol wipe.    GUIDANCE DESCRIPTION:  Electro-myographic guidance was necessary throughout the procedure to accurately identify all areas of dystonic muscles while avoiding injection of non-dystonic muscles, neighboring nerves and nearby vascular structures.       AREA/MUSCLE INJECTED:  200 UNITS BOTOX = TOTAL DOSE    1. HEAD & NECK MUSCLES: Total dose = 200 units Botox, 1:1 Dilution      Right Mid-Trapezius - 25 units of Botox at 1 site/s.   Left Mid-Trapezius - 15 units of Botox at 1 site/s.      Right lateral Trapezius - 30 units of Botox at 1 site.  Left lateral Trapezius - 10 units of Botox at 1 site.      Right Splenius Cervicis - 30 units of Botox at 1 site/s.   Left Splenius Cervicis - 15 units of Botox at 1 site/s.      Right Levator Scapulae - 25 units of Botox at 1 site/s (shoulder muscles).   Left Levator Scapulae - 10 units of Botox at 1 site/s (shoulder muscles).      Right Longissimus Capitis - 5 units of Botox at 1 site/s.        Right Inferior Obliquus Capitis - 5 units of Botox at 1 site/s.                  Right Sternocleidomastoid - 20 units of Botox at 1 site/s.               Left Sternocleidomastoid - 10 units of Botox at 1 site/s.       RESPONSE TO PROCEDURE:  Kendra Vaca tolerated the procedure well and there were no immediate complications.  She was allowed to recover for an appropriate period of time and was discharged home in stable condition.    FOLLOW UP:  Kendra Vaca was asked to follow up by phone in 7-14 days with Gissel Holt PT  to report her response to this series of injections.  Based on the patient's previous response to this therapy, Kendra Vaca was rescheduled for the next series of injections in 12 weeks.    PLAN (Medication Changes, Therapy Orders, Work or Disability Issues, etc.): Patient will monitor her response to today's injections and report.     Again, thank you for allowing me to participate in the care of your patient.      Sincerely,    Virginia Rosas MD

## 2021-05-04 ENCOUNTER — VIRTUAL VISIT (OUTPATIENT)
Dept: FAMILY MEDICINE | Facility: CLINIC | Age: 61
End: 2021-05-04
Payer: MEDICARE

## 2021-05-04 ENCOUNTER — AMBULATORY - HEALTHEAST (OUTPATIENT)
Dept: FAMILY MEDICINE | Facility: CLINIC | Age: 61
End: 2021-05-04

## 2021-05-04 DIAGNOSIS — Z11.59 SCREENING FOR VIRAL DISEASE: ICD-10-CM

## 2021-05-04 DIAGNOSIS — Z20.822 EXPOSURE TO COVID-19 VIRUS: ICD-10-CM

## 2021-05-04 DIAGNOSIS — M79.10 MYALGIA: ICD-10-CM

## 2021-05-04 DIAGNOSIS — Z20.822 SUSPECTED COVID-19 VIRUS INFECTION: ICD-10-CM

## 2021-05-04 DIAGNOSIS — Z11.59 SCREENING FOR VIRAL DISEASE: Primary | ICD-10-CM

## 2021-05-04 PROCEDURE — 99442 PR PHYSICIAN TELEPHONE EVALUATION 11-20 MIN: CPT | Mod: 95 | Performed by: FAMILY MEDICINE

## 2021-05-04 RX ORDER — IBUPROFEN 800 MG/1
800 TABLET, FILM COATED ORAL
COMMUNITY

## 2021-05-04 NOTE — PROGRESS NOTES
"Kendra is a 60 year old who is being evaluated via a billable telephone visit.      What phone number would you like to be contacted at? [phone] (H)   How would you like to obtain your AVS? Mail a copy    Assessment & Plan     Screening for viral disease    - Symptomatic COVID-19 Virus (Coronavirus) by PCR; Future    Myalgia  Exposure to COVID-19 virus  Suspected COVID-19 virus infection    I reviewed quarantine recommendations, she does have a roommate and they share a bathroom, I recommended he get tested and try to live somewhere else for the next 10 days if possible, otherwise, she should stay in her own room and clean carefully after being in the bathroom. See AVS. The patient indicates understanding of these issues and agrees with the plan.       Tobacco Cessation:   reports that she has been smoking cigarettes. She has a 17.50 pack-year smoking history. She has never used smokeless tobacco.      Return in about 1 week (around 5/11/2021) for follow up if not improving. or go to ER if symptoms worsen acutely.    Sherrie Rader MD  [facility]    Subjective   Kendra is a 60 year old who presents for the following health issues     HPI     Headache, muscle ache, fever, chills, cough, fatique. exposed to covid positive. Her fiance's sister is currently hospitalized with severe covid symptoms, \"they don't think she's going to make it\". Her fiance has been vaccinated.    Concern for COVID-19  About how many days ago did these symptoms start? Saturday , 3 days ago  She has sever headache, chills, severe muscle aches. She feels like she's breathing normally.  Is this your first visit for this illness? Yes  In the 14 days before your symptoms started, have you had close contact with someone with COVID-19 (Coronavirus)? Yes, I have been in contact with someone who has COVID-19/Coronavirus (confirmed by lab test).  Do you have a fever or chills? Yes, I felt feverish or had " chills  Are you having new or worsening difficulty breathing? No  Do you have new or worsening cough? No  Have you had any new or unexplained body aches? YES    Have you experienced any of the following NEW symptoms?    Headache: YES    Sore throat: No    Loss of taste or smell: No    Chest pain: Not anymore     Diarrhea: No    Rash: No  What treatments have you tried? Extra strength pain relief for headache   Who do you live with? Roommate   Are you, or a household member, a healthcare worker or a ? No  Do you live in a nursing home, group home, or shelter? No  Do you have a way to get food/medications if quarantined? Yes, I have a friend or family member who can help me.              Review of Systems   Constitutional, HEENT, cardiovascular, pulmonary, GI, , musculoskeletal, neuro, skin, endocrine and psych systems are negative, except as otherwise noted.      Objective           Vitals:  No vitals were obtained today due to virtual visit.    Physical Exam   healthy, alert and no respiratory distress noted, although she seems anxious  PSYCH: Alert and oriented times 3; coherent speech, normal   rate and volume, anxious.  RESP: intermittent dry cough, no audible wheezing, able to talk in full sentences  Remainder of exam unable to be completed due to telephone visits                Phone call duration: 12 minutes

## 2021-05-04 NOTE — PATIENT INSTRUCTIONS
Instructions for Patients  Your symptoms show that you may have coronavirus (COVID-19). This illness can cause fever, cough and trouble breathing. Many people get a mild case and get better on their own. Some people can get very sick.     How can I protect others?    Without a test, we can t know for sure that you have COVID-19. For safety, it s very important to follow these rules.    Stay home and away from others (self-isolate) until:    You ve had no fever--and no medicine that reduces fever--for 1 full day (24 hours), And     Your other symptoms have resolved (gotten better). For example, your cough or breathing has improved, And     At least 10 days have passed since your symptoms started.    During this time:    Stay in your own room (and use your own bathroom), if you can.    Stay away from others in your home. No hugging, kissing or shaking hands.    No visitors.    Don t go to work, school or anywhere else.     Clean  high touch  surfaces often (doorknobs, counters, handles, etc.). Use a household cleaning spray or wipes.    Cover your mouth and nose with a mask, tissue or washcloth to avoid spreading germs.    Wash your hands and face often. Use soap and water.    For more tips, go to https://www.cdc.gov/coronavirus/2019-ncov/downloads/10Things.pdf.    How can I take care of myself?    1. Get lots of rest. Drink extra fluids (unless a doctor has told you not to).     2. Take Tylenol (acetaminophen) for fever or pain. If you have liver or kidney problems, ask your family doctor if it's okay to take Tylenol.     Adults can take either:     650 mg (two 325 mg pills) every 4 to 6 hours, or     1,000 mg (two 500 mg pills) every 8 hours as needed.     Note: Don't take more than 3,000 mg in one day.   Acetaminophen is found in many medicines (both prescribed and over-the-counter medicines). Read all labels to be sure you don't take too much.   For children, check the Tylenol bottle for the right dose.  The dose is based on  the child's age or weight.    3. If you have other health problems (like cancer, heart failure, an organ transplant or severe kidney disease): Call your specialty clinic if you don't feel better in the next 2 days.    4. Know when to call 911: If your breathing is so bad that it keeps you from doing normal activities, call 911 or go to the emergency room. Tell them that you've been staying home and may have COVID-19.      Thank you for limiting contact with others, wearing a simple mask to cover your cough, practice good hand hygiene habits and accessing our virtual services where possible to limit the spread of this virus.    For more information about COVID19 and options for caring for yourself at home, please visit the CDC website at https://www.cdc.gov/coronavirus/2019-ncov/about/steps-when-sick.html  For more options for care at [facility], please visit our website at [email or web address]

## 2021-05-05 ENCOUNTER — COMMUNICATION - HEALTHEAST (OUTPATIENT)
Dept: FAMILY MEDICINE | Facility: CLINIC | Age: 61
End: 2021-05-05

## 2021-05-05 LAB
SARS-COV-2 PCR COMMENT: ABNORMAL
SARS-COV-2 RNA SPEC QL NAA+PROBE: POSITIVE
SARS-COV-2 VIRUS SPECIMEN SOURCE: ABNORMAL

## 2021-05-06 ENCOUNTER — COMMUNICATION - HEALTHEAST (OUTPATIENT)
Dept: SCHEDULING | Facility: CLINIC | Age: 61
End: 2021-05-06

## 2021-05-18 NOTE — NURSING NOTE
CHW- OT Form up to you to be signed and faxed back to [phone].   COLONSCOPY IS SCHEDULED TO BE DONE ON 7/9/2018. MAMMOGRAM WILL BE SCHEDULED ONCE HER NEW CLINIC RECEIVED HER RECORDS FROM [facility]  Zulema Malcolm MA

## 2021-05-27 NOTE — PROGRESS NOTES
Subjective: Patient comes in for evaluation this 58-year-old female has had some rash under the breasts bilaterally.  It has been going on and off over the last couple months.  She is having some itching with this.    She is tried some over-the-counter creams, Goldbond, cortisone etc.    She states that it is a little moist at times.    Denies any drainage from the nipple denies any masses through the breast area.    Denies any axillary adenopathy.  She has not been on any antibiotics    Tobacco status: She  reports that she has been smoking cigarettes.  She has been smoking about 0.50 packs per day. She has never used smokeless tobacco.    Patient Active Problem List    Diagnosis Date Noted     Spinal cord disease (H) 06/06/2018     Tremor 06/06/2018     Nicotine abuse 06/06/2018       Current Outpatient Medications   Medication Sig Dispense Refill     botulinum toxin Type A, Cosm, (BOTOX) 100 unit SolR Inject into the shoulder, thigh, or buttocks every 2 (two) months.       nystatin (MYCOSTATIN) powder Apply to affected area 3 times daily 60 g 1     No current facility-administered medications for this visit.        ROS: 10 point review of systems positive as outlined otherwise negative    Objective:    /72 (Patient Site: Right Arm, Patient Position: Sitting, Cuff Size: Adult Regular)   Pulse 80   Temp 97.3  F (36.3  C) (Oral)   Wt 169 lb (76.7 kg)   BMI 28.56 kg/m    Body mass index is 28.56 kg/m .    General appearance no acute distress    HEENT neck negative no adenopathy oropharynx clear mucosal membranes normal    Skin without rash other than under the breast bilaterally    She does have some evidence of some erythema with some satellite lesions consistent with candidiasis.    Abdomen is soft nontender.    No axillary adenopathy.    Lungs are clear no rales or rhonchi heart was regular S1-S2, rate at 80    No results found for this or any previous visit.    Assessment:  1. Candidiasis of breast   nystatin (MYCOSTATIN) powder     Treated with nystatin powder twice daily, follow-up if not resolving    Plan: As outlined above    This transcription uses voice recognition software, which may contain typographical errors.

## 2021-05-28 NOTE — PROGRESS NOTES
Last year shingles.  Followed by pain.  Severe.  Gabapentin given.  Numbed the pain.  Stopped after couple weeks.      Hx gabapentin for tremors but gained wt and thus stopped soon.   Last year on gabapentin for a month.    Now the pain from the shingles is back in the same spot.  Two nights. Not during day.   Positional.   A lumbar pulsating.  Feels just like the shingles last year.    Tremors treated by injections with botox        Current rash under both breasts seen recently and without benefit from nystatin.  Then tried tmc cream which helped two days but then came back worse.    Overweight  Pre Diabetes denies polyuria.     Nicotine dependence.  Denies hemoptysis.   OBJECTIVE:   Vitals:    05/15/19 1053   BP: 122/76   Pulse: 64      Eyes: non icteric, noninflamed  Lungs: no resp distress  Heart: regular  Ankles: no edema  Muscles: nontender  Mental status: euthymic  Neuro: nonfocal    Body mass index is 29.07 kg/m .   Tremor  Small inframammary erythematous scaliness sharp borders.  Dry    Light touching over L5 on the left elicits abnormal sensory    Not on right  ASSESSMENT/PLAN:    1. Nicotine abuse     2. Dermatitis  ketoconazole (NIZORAL) 2 % cream   3. Overweight (BMI 25.0-29.9)  Basic Metabolic Panel   4. Tremor     5. Neuralgia       Life style modification re cig  Antifungal  R/o diabetes mellitus  Discussed gabapentin for post zoster neuralgia.  Declined.  follow up 4 months

## 2021-05-30 NOTE — PROGRESS NOTES
"  Chief Complaint   Patient presents with     Sore Throat     x 2-3 days      Rash     has been on her left arm for a couple weeks          HPI:   Kendra Vaca is a 59 y.o. female c/o sore throat for the last two days.  Lots of fatigue.  No fever.  Mild right sided ear pain.  Mild right sided neck swelling.  Good fluid intake.  Normal urination.  Has had a bit of a cough.  Usually smoke 1/2 ppd.  No stomach upset.  No headache.  No diarrhea.  No known exposure to strep.  No medication taken.    C/o rash on inside of right elbow and a little on left elbow.  Has had candidiasis on skin under breasts--using ketoconazole.  Needs refill.  Thinks the new rash looks like this      ROS:  A 10 point comprehensive review of systems was negative except as noted.     Medications:  Current Outpatient Medications on File Prior to Visit   Medication Sig Dispense Refill     ketoconazole (NIZORAL) 2 % cream Apply topically 2 (two) times a day. 30 g 0     botulinum toxin Type A, Cosm, (BOTOX) 100 unit SolR Inject into the shoulder, thigh, or buttocks every 2 (two) months.       nystatin (MYCOSTATIN) powder Apply to affected area 3 times daily 60 g 1     No current facility-administered medications on file prior to visit.          Social History:  Social History     Tobacco Use     Smoking status: Current Every Day Smoker     Packs/day: 0.50     Types: Cigarettes     Smokeless tobacco: Never Used   Substance Use Topics     Alcohol use: Yes     Comment: Social         Physical Exam:   Vitals:    07/22/19 1358   BP: 110/68   Pulse: 74   Resp: 16   Temp: 98.1  F (36.7  C)   TempSrc: Oral   SpO2: 95%   Weight: 169 lb (76.7 kg)   Height: 5' 4.5\" (1.638 m)       GENERAL:   Alert. Oriented.  EYES: Clear  HENT:  Ears: R TM pearly gray. Normal landmarks. L TM pearly gray.  Normal landmarks  Nose: Clear.  Sinuses: Nontender.  Oropharynx:  No erythema. No exudate.  NECK: Supple. No adenopathy.  LUNGS: Clear to ascultation.  No crackles.  " No wheezing  HEART: RRR  SKIN:  Erythematous papules scattered on flexure surface of right elbow.  Few on left elbow.  Few salmon colored papules under breasts    LABS:  Results for orders placed or performed in visit on 07/22/19   Rapid Strep A Screen- Throat Swab   Result Value Ref Range    Rapid Strep A Antigen No Group A Strep detected, presumptive negative No Group A Strep detected, presumptive negative        Assessment/Plan:    1. Sore throat  Viral sore throat.  Symptomatic treatment as needed  - Rapid Strep A Screen- Throat Swab  - Group A Strep, RNA Direct Detection, Throat    2. Dermatitis  Will treat rash on elbows with triamcinolone  Can try some ketoconazole also  - triamcinolone (KENALOG) 0.1 % ointment; Apply topically 2 (two) times a day.  Dispense: 30 g; Refill: 0    3. Candidiasis of breast  Refilled ketoconazole.   Discussed that a yeast skin infection is often recurrent  Keep as dry as possible.  - ketoconazole (NIZORAL) 2 % cream; Apply topically 2 (two) times a day.  Dispense: 30 g; Refill: 0       Recheck as needed.    Advised she's due for a pap.    Lupe Dueñas MD      7/22/2019    The following portions of the patient's history were reviewed and updated as appropriate: allergies, current medications, past family history, past medical history, past social history, past surgical history and problem list.

## 2021-06-01 VITALS — HEIGHT: 65 IN | BODY MASS INDEX: 27.49 KG/M2 | WEIGHT: 165 LBS

## 2021-06-02 VITALS — BODY MASS INDEX: 29.94 KG/M2 | WEIGHT: 169 LBS

## 2021-06-03 VITALS — BODY MASS INDEX: 30.47 KG/M2 | WEIGHT: 172 LBS

## 2021-06-03 VITALS — WEIGHT: 169 LBS | HEIGHT: 65 IN | BODY MASS INDEX: 28.16 KG/M2

## 2021-06-04 VITALS
SYSTOLIC BLOOD PRESSURE: 98 MMHG | WEIGHT: 175 LBS | OXYGEN SATURATION: 96 % | HEART RATE: 76 BPM | TEMPERATURE: 97.8 F | BODY MASS INDEX: 31 KG/M2 | DIASTOLIC BLOOD PRESSURE: 62 MMHG

## 2021-06-05 VITALS — HEIGHT: 65 IN | BODY MASS INDEX: 28.74 KG/M2 | WEIGHT: 172.5 LBS

## 2021-06-05 VITALS
HEART RATE: 85 BPM | BODY MASS INDEX: 30.65 KG/M2 | DIASTOLIC BLOOD PRESSURE: 92 MMHG | SYSTOLIC BLOOD PRESSURE: 144 MMHG | WEIGHT: 173 LBS

## 2021-06-06 NOTE — TELEPHONE ENCOUNTER
Patient Returning Call  Reason for call:  Return call.   Information relayed to patient:  Caller was informed of message below.   Patient has additional questions:  Yes  If YES, what are your questions/concerns:  Caller stated that she is schedule to see urology on 03/30 and will just continue taking the medication that as given at the ER.   Okay to leave a detailed message?: No call back needed

## 2021-06-06 NOTE — TELEPHONE ENCOUNTER
Patient was seen in ED last night in [place].  She had a reaction to Cipro she had been on all week. Swelling in neck.    Swelling is almost gone today.  Patient asking for different antibiotic to be on till she can be seen by urology.  Still blood in her urine and most likely a stone in her urine she was told by ED.    Pharmacy confirmed.    Virginia Banerjee RN [facility] Triage Nurse Advisor    Reason for Disposition    Caller has URGENT medication question about med that PCP prescribed and triager unable to answer question    Protocols used: MEDICATION QUESTION CALL-A-AH

## 2021-06-06 NOTE — PROGRESS NOTES
Chief Complaint:  Chief Complaint   Patient presents with     Dysuria     Wetting the bed  4 times in 2wks and lower back pain       HPI:   Kendra Vaca is a 59 y.o. female here with urine problems.  For the past 2 weeks she has been wetting the bed occasionally at night.  She also notes urine leakage if she is sneezing or walking for a long time.  She feels like symptoms are getting worse.  She thinks she may have had an episode of urine leakage in the past, but she thinks that was related to pelvic floor muscle weakness.  She has felt like her acid reflux has been a little worse recently.  She has also had some burning in the right lower back area.  She does not recall falling or any injuries recently.  No dysuria.  She has had some hot flashes from menopause, ongoing.  No fevers, no nausea, no vomiting.  She does also feel like she has urinary urgency.    She has a tremor for which she is getting ongoing treatment with neurology.  Past history of neck/head injury.    Patient Active Problem List   Diagnosis     Spinal cord disease (H)     Tremor     Nicotine abuse       Current Outpatient Medications:      ciprofloxacin HCl (CIPRO) 500 MG tablet, Take 1 tablet (500 mg total) by mouth 2 (two) times a day for 7 days., Disp: 14 tablet, Rfl: 0     ketoconazole (NIZORAL) 2 % cream, Apply topically 2 (two) times a day., Disp: 30 g, Rfl: 0     nystatin (MYCOSTATIN) powder, Apply to affected area 3 times daily, Disp: 60 g, Rfl: 1     triamcinolone (KENALOG) 0.1 % ointment, Apply topically 2 (two) times a day., Disp: 30 g, Rfl: 0    Objective:  Allergies:  Allergies   Allergen Reactions     Seafood Hives     Latex      Morphine Hives       Vitals:    03/10/20 0903   BP: 98/62   Patient Site: Right Arm   Patient Position: Sitting   Cuff Size: Adult Regular   Pulse: 76   Temp: 97.8  F (36.6  C)   TempSrc: Oral   SpO2: 96%   Weight: 175 lb (79.4 kg)     Body mass index is 29.57 kg/m .    Vital signs reviewed  General:  Patient is alert and oriented x 3, in no apparent distress, mild head/neck tremor present  Cardiac: Regular rate and rhythm, no murmurs  Lungs: Clear to auscultation bilaterally, No crackles, rales, rhonchi, or wheezing noted  Abdomen: Non tender to palpation, no hepatosplenomegaly, negative Patel's sign, no pain over McBurney's point, positive bowel sounds, no masses palpable  Neuro: Patient walks in slightly stiff but tandem gait  Musculoskeletal: area of discomfort is right low back, no pain with direct palpation there    Results for orders placed or performed in visit on 03/10/20   Urinalysis   Result Value Ref Range    Color, UA Yellow Colorless, Yellow, Straw, Light Yellow    Clarity, UA Clear Clear    Glucose, UA Negative Negative    Bilirubin, UA Negative Negative    Ketones, UA Negative Negative    Specific Gravity, UA 1.025 1.005 - 1.030    Blood, UA Moderate (!) Negative    pH, UA 5.0 5.0 - 8.0    Protein, UA Negative Negative mg/dL    Urobilinogen, UA 0.2 E.U./dL 0.2 E.U./dL, 1.0 E.U./dL    Nitrite, UA Negative Negative    Leukocytes, UA Negative Negative     Urine culture is pending.    Assessment and Plan:  1.  Urine incontinence.  Differential includes most likely UTI, possible pyelonephritis.  Other causes could be pelvic floor muscle weakness, neurogenic bladder, versus other.  Prescription sent for Cipro to take twice daily for 5 to 7 days.  I will follow-up closely with urine culture tomorrow.  If urine culture is normal, and symptoms do not resolve, would consider referral to urology or MRI of lumbar sacral spine.  No known cause for nerve or significant muscle injury.  She notes symptoms seem to be getting a bit worse.  Keep a close eye on this.  No red flags on exam today.  I reviewed with her that should any of her symptoms worsen, or if she should develop a fever, she should go in to the emergency room.  We will check in on her within 1 to 2 days, when her urine culture returns.    2.  Right  "low back pain.  Pain today in the right low back.  Her report of where the pain is changes a bit, sometimes she points to the right mid back near the lower ribs.  If this is UTI/pyelonephritis, that could explain her pain.  Other cause could be musculoskeletal pain.  No known fall or injury to trigger pain.  Continue to monitor.    3. Healthcare Maintenance.  She thinks she is probably up-to-date with her tetanus shot, though I can't find any documentation in EHR.  Declines flu shot.  Says she got \"very sick\" last time she got a flu shot.    --------------------  Addendum:  Urine culture came back negative.  I called patient on 3/11/2020 to check in and give her test results.  She notes she has not had any urine problems since I saw her in clinic.  She is taking the Cipro I prescribed.  She says her back pain has improved significantly and she is feeling much better.  She wants to keep taking the Cipro.  I would still like her to see urology to follow-up.  Referral placed today.  All of her questions were answered.    This dictation uses voice recognition software, which may contain typographical errors.  "

## 2021-06-06 NOTE — TELEPHONE ENCOUNTER
As I told her over the phone, her urine did not grow any bacteria, so she does not have a urine infection.  She doesn't need another antibiotic.  She should see urology and be evaluated by them.

## 2021-06-06 NOTE — TELEPHONE ENCOUNTER
----- Message from Siri Wilian sent at 3/12/2020  9:07 AM CDT -----  Regarding: er visit  Called pt to inform Urology appt she was seen yesterday given a new RX had a allergic reaction to it went to ER. Would like to discuss this with Yuridia or team.  Thank you

## 2021-06-07 NOTE — TELEPHONE ENCOUNTER
RN cannot approve Refill Request    RN can NOT refill this medication med is not covered by policy/route to provider     . Last office visit: 7/22/2019 Lupe Dueñas MD Last Physical: Visit date not found Last MTM visit: Visit date not found Last visit same specialty: 3/10/2020 Yuridia Guerrero PA-C.  Next visit within 3 mo: Visit date not found  Next physical within 3 mo: Visit date not found      Leeanne Noguera, [facility] Connection Triage/Med Refill 4/9/2020    Requested Prescriptions   Pending Prescriptions Disp Refills     triamcinolone (KENALOG) 0.1 % ointment [Pharmacy Med Name: Triamcinolone Acetonide External Ointment 0.1 %] 30 g 0     Sig: Apply topically 2 (two) times a day.       There is no refill protocol information for this order            WELL MAN EXAM    CC:  Complete physical exam    HPI: 64 year old male who presents for annual preventative physical exam.  Last physical: 1-2 years ago.  Last screening lab work: Prior to this appointment.  Pertinent family history of cancer: YES: Lymphoma in brother.  Last colonoscopy: October 2014. Recall in 10 years..    Past medical history/other concerns to discuss at this visit: Hypertension and hyperlipidemia    HTN: Onset was chronic. Currently taking Lisinopril 5 mg once daily. Patient reports good compliance with prescribed regimen. Denies any medication side effects. Currently asymptomatic. Denies any new concerns at this time.     Hyperlipidemia: Onset was chronic. Currently taking Atorvastatin (Lipitor) 20 mg once daily. Patient reports good compliance with prescribed regimen. Denies any medication side effects. Currently asymptomatic. Denies any new concerns at this time.      ______________________________________________________________________    History reviewed.     Vaccinations reviewed.    Past Medical History:   Diagnosis Date   • Basal cell carcinoma     Basal cell carcinoma left eyelid left lower lid 10/17/2012 Dr Lauren Blount/Mohs with Dr. Bosch   • Basal cell carcinoma     History of NMSC (BCC) on right forehead removed by mohs micrographic surgery 1999.   • Essential hypertension 10/9/2017   • Hiatal hernia    • Keratosis, actinic    • Kidney calculi    • Pure hypercholesterolemia    • Skin cancer    • Squamous cell carcinoma    • Subarachnoid hemorrhage (CMS/HCC) 10/3/2017   • Tobacco abuse     quit 2014       Past Surgical History:   Procedure Laterality Date   • Colonoscopy  10/7/2014    Dr. Cheng rectal polyp recall 10 years   • Esophagogastroduodenoscopy transoral flex diag  03/26/2015    Dr. Cheng   • Remove tonsils/adenoids,<13 y/o      T & A   • Skin cancer excision  2012   • Thromboendart w/w0 graft carotid  neck incs Left 05/30/2018    left; bovine patch -  kavya        Social History     Socioeconomic History   • Marital status: /Civil Union     Spouse name: Not on file   • Number of children: 3   • Years of education: Not on file   • Highest education level: Not on file   Social Needs   • Financial resource strain: Not on file   • Food insecurity - worry: Not on file   • Food insecurity - inability: Not on file   • Transportation needs - medical: Not on file   • Transportation needs - non-medical: Not on file   Occupational History   • 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   Tobacco Use   • Smoking status: Former Smoker     Packs/day: 1.00     Years: 35.00     Pack years: 35.00     Types: Cigarettes     Last attempt to quit: 2014     Years since quittin.8   • Smokeless tobacco: Never Used   Substance and Sexual Activity   • Alcohol use: Yes     Comment:  maybe one a month   • Drug use: No   • Sexual activity: Not Currently     Partners: Female   Other Topics Concern   • Not on file   Social History Narrative   • Not on file       Family History   Problem Relation Age of Onset   • Thyroid Mother         Removed   • Coronary Artery Disease Father         MI   • Cancer Brother         Lymphoma           MEDICATIONS AND ALLERGIES:  As recorded in the patient's chart (under \"Medications\" and \"Allergies\").  Reviewed with the patient today and updated.    Current Outpatient Medications   Medication   • lisinopril (ZESTRIL) 5 MG tablet   • atorvastatin (LIPITOR) 20 MG tablet   • cholecalciferol (VITAMIN D3) 1000 UNITS tablet     No current facility-administered medications for this visit.        ALLERGIES:   Allergen Reactions   • Clindamycin Other (See Comments)     PAIN IN FLANK AREA   • Enalapril Other (See Comments)     UNKNOWN REACTION   • Hydralazine Other (See Comments)     Confussion, halucination?       ______________________________________________________________________      REVIEW OF SYSTEMS:    CONSTITUTIONAL: No  Fevers/Changes in oral intake/Unintended weight loss.  EYES: No Visual Changes/Eye redness/Eye discharge.  HEAD/EARS/NOSE/THROAT/MOUTH: No Headaches/Ear Pain/Ear discharge/Nasal congestion/Nasal discharge/Sore Throat/Sinus Pain.  NECK: No Swelling/Masses.  RESPIRATORY: No Cough/Sputum/Wheezing/Shortness of breath.  CARDIOVASCULAR: No Chest pain/Palpitations/Syncope.  GASTROINTESTINAL: No Abdominal Pain/Nausea/Vomiting/Diarrhea/Constipation.  INTEGUMENTARY: No Rashes/Pruritus/Lumps.  HEMATOLOGIC/LYMPHATIC: No Enlarged lymph nodes.  ALLERGY/IMMUNOLOGIC: No Asthma/Eczema/Hives/Rhinitis.  MUSCULOSKELETAL: No Joint pain/Joint swelling.  NEUROLOGICAL: No Numbness/Tingling/Seizures.  PSYCHIATRIC: No Depression/Anxiety/Panic attacks.         PHYSICAL EXAM:  Visit Vitals  /76 (BP [place]: [facility] [place] Position: Sitting, Cuff Size: Large Adult)   Pulse 73   Ht 5' 7\" (1.702 m)   Wt 77.3 kg   SpO2 97%   BMI 26.68 kg/m²     GENERAL:  Well-developed, well-hydrated pleasant male in no acute distress.    SKIN: Normal color for ethnicity, normal texture, good turgor, no skin rashes, no atypical-appearing skin lesions, no bruises.  LYMPH NODES: No cervical, supraclavicular adenopathy.   HEAD: Normocephalic.  EYES: Pupils are equal and reactive to light and accommodation, extraocular movements are full, sclerae and conjunctivae are normal, lids and lashes are normal.    NOSE: External nose is normal to inspection, no septal deviation.  MOUTH/THROAT: Tongue is midline and appears normal, oropharynx appears normal, soft palate and uvula are normal, oral mucosa is normal.    NECK: Neck is supple, no thyromegaly, trachea is midline.  CHEST: Configuration normal, nontender to palpation, respiratory effort is not labored.  LUNGS: Lungs are clear to auscultation with normal inspiratory/expiratory sounds, no rales, no rhonchi, no wheezes.  CARDIOVASCULAR: Normal rate and rhythm, S1 and S2 normal, no S3 or S4, no murmurs, no extra heart  sounds.   No carotid or abdominal bruits. Radial and carotid pulses 2+ bilaterally.  ABDOMEN: Abdomen is soft, normal active bowel sounds, nontender, without masses, without hepatomegaly or splenomegaly, no pulsatile masses.  BACK: Inspection shows normal curvature, no discomfort to palpation of midline, no costovertebral angle discomfort to palpation.  EXTREMITIES: No clubbing, no cyanosis, no edema, normal muscle tone and development bilaterally.  NEUROLOGIC:  Alert, appropriate, oriented x 3.  Speech fluent. Motor strength intact and symmetric, no apraxia or ataxia observed, no tremor noted.  PSYCHIATRIC:  Affect appropriate, insight fair, mood good.    ______________________________________________________________________      DATA:   Labs:   Lab Results   Component Value Date    SODIUM 140 05/15/2018    POTASSIUM 4.2 05/15/2018    CHLORIDE 102 05/15/2018    CO2 26 05/15/2018    BUN 22 (H) 05/15/2018    CREATININE 0.72 05/15/2018    GLUCOSE 97 06/28/2018     Lab Results   Component Value Date    CHOLESTEROL 195 03/06/2019    HDL 66 03/06/2019    CALCLDL 114 03/06/2019    TRIGLYCERIDE 76 03/06/2019       ______________________________________________________________________    ASSESSMENT:  Complete physical exam  Routine healthcare maintenance   See below for additional concerns/follow-up addressed, if applicable.    PLAN:  Discussed proper self-care including diet, exercise safety, occupational safety, stress reduction.  Pending labs: See Orders  Colonoscopy not indicated  Discussed pertinent vaccinations.   Return annually for routine physical exam.    1. HTN: Well-controlled. Will trial discontinuation of Lisinopril. Patient provided counseling on modifying lifestyle factors to improve outcomes.  Discussed concerning signs/symptoms requiring urgent evaluation and indications for ER visit.  Patient expressed understanding of plan. Follow-up : 6 weeks.     2. HLD: Well-controlled. Will continue current regimen  without changes.  Patient provided counseling on modifying lifestyle factors to improve outcomes.  Discussed concerning signs/symptoms requiring urgent evaluation and indications for ER visit.  Patient expressed understanding of plan. Follow-up : 1 year.         Crow Plascencia MD  03/15/19

## 2021-06-07 NOTE — TELEPHONE ENCOUNTER
Called and spoke patient she stated she is in [place] and needed some. She has plenty at home, but she is away from home was hoping to get a prescription sent to [place], however, She was able to find some of her Mother's old prescription and to diregard this message.

## 2021-06-12 NOTE — PROGRESS NOTES
"  Subjective:      Kendra Vaca is a 60 y.o. female with chief complaint of acute back pain.  She fell on the ground after it snowed 2 or 3 days ago.  She did not have pain at that time.  Last night she was helping a friend to clean the floors.  She backed up into a table and then a thermos full of liquid hit her in the back.  Again she said she did not have pain at the time.  However yesterday evening pain started getting worse.  Pain today is minimally improved as compared to last night.  No loss of control bladder or bowel function.  Pain is primarily in the middle lumbar sacral spine.  Occasionally gets pain radiating into her legs if she is standing for too long.  She describes the pain today is \"excruciating.\"  She has tried ice and heat.  She did not sleep well last night.  She is not working right now, on disability.  She has had mild back injuries low back injuries before, but no surgeries or significant problems.  She says she is taking 1000 mg ibuprofen at a time.  I reviewed that she should limit this to 800 mg at a time with food.  She says her neurologist told her it was okay to take 1000 mg at a time.  She also took a few drinks of alcohol.  None of this has helped her pain.  She consider going into the emergency room last night, but did not.  She denies any loss of control of bladder or bowel function.    Patient Active Problem List   Diagnosis     Spinal cord disease (H)     Tremor     Nicotine abuse     Peripheral neuropathy     Insomnia     Hyperopia of both eyes with astigmatism and presbyopia     History of alcoholism (H)       Current Outpatient Medications:      EPINEPHrine (EPIPEN/ADRENACLICK/AUVI-Q) 0.3 mg/0.3 mL injection, Inject 0.3 mg into the shoulder, thigh, or buttocks. Inject 0.3 mg into the shoulder, thigh, or buttocks., Disp: , Rfl:      ketoconazole (NIZORAL) 2 % cream, Apply topically 2 (two) times a day., Disp: 30 g, Rfl: 0     predniSONE (DELTASONE) 10 mg tablet, Take 40 " mg by mouth daily for 5 days., Disp: 20 tablet, Rfl: 0     triamcinolone (KENALOG) 0.1 % ointment, Apply topically 2 (two) times a day., Disp: 30 g, Rfl: 0      Tobacco Use      Smoking status: Current Every Day Smoker        Packs/day: 0.50        Types: Cigarettes      Smokeless tobacco: Never Used       Objective:     Allergies   Allergen Reactions     Ciprofloxacin Other (See Comments)     Throat swelling     Seafood Hives     Latex      Morphine Hives     Vitals:    10/28/20 1053   BP: (!) 144/92   Pulse: 85     Body mass index is 29.24 kg/m .    Vitals reviewed as above.  General: Patient is alert and oriented x 3, shifting in her chair and moaning frequently during visit  Cardiac: Regular rate and rhythm, no murmurs  Pulmonary: lungs clear to ausculation, no crackles, rales, rhonchi, or wheezing  Musculoskeletal: normal 4/5 strength in major muscle groups in lower extremities bilaterally, patient walks in slow stiff gait  Exam significantly limited due to patient's discomfort      Assessment and Plan:     1. Acute bilateral low back pain without sciatica  History of some back pain off-and-on in the past.  She is in quite a lot of pain today.  I suggested she consider going to the emergency room.  She wants to wait on that if possible.  I discussed treatment options with her.  She says she is taking 1000 mg ibuprofen at a time.  I discussed with her that I would limit it to 800 mg at a time with food.  She says that her neurologist has told her it is okay to take 1000 mg and she states she will keep taking that.  Prescription sent for prednisone, I reviewed appropriate use with her.  I also discussed with her that if prednisone does not seem to be helping, given her complicated medical history, she should go to the emergency room today.  Aside from her significant pain presented, no red flags during exam or history.  - predniSONE (DELTASONE) 10 mg tablet; Take 40 mg by mouth daily for 5 days.  Dispense: 20  tablet; Refill: 0      This dictation uses voice recognition software, which may contain typographical errors.

## 2021-06-12 NOTE — TELEPHONE ENCOUNTER
Kendra fell the other day two days ago and landed on concrete.  Now is having pain in lower tail bone and pain shoots towards the right.  Kendra was some floors and that is when the pain began.  Pain was not present after the fall.  Kendra is requesting and in person clinic visit.    COVID 19 Nurse Triage Plan/Patient Instructions    Please be aware that novel coronavirus (COVID-19) may be circulating in the community. If you develop symptoms such as fever, cough, or SOB or if you have concerns about the presence of another infection including coronavirus (COVID-19), please contact your health care provider or visit [email or web address].     Disposition/Instructions    In-Person Visit with provider recommended. Reference Visit Selection Guide.    Thank you for taking steps to prevent the spread of this virus.  o Limit your contact with others.  o Wear a simple mask to cover your cough.  o Wash your hands well and often.    Resources    [facility]: About COVID-19: [email or web address]    CDC: What to Do If You're Sick: www.cdc.gov/coronavirus/2019-ncov/about/steps-when-sick.html    CDC: Ending Home Isolation: www.cdc.gov/coronavirus/2019-ncov/hcp/disposition-in-home-patients.html     CDC: Caring for Someone: www.cdc.gov/coronavirus/2019-ncov/if-you-are-sick/care-for-someone.html     [facility]: Interim Guidance for Hospital Discharge to Home: www.[facility]/diseases/coronavirus/hcp/hospdischarge.pdf    [facility] clinical trials (COVID-19 research studies): [email or web address]-clinical-trials     Below are the COVID-19 hotlines at the [facility] of Health ([facility]). Interpreters are available.   o For health questions: Call 124-372-8775 or 1-746.391.4820 (7 a.m. to 7 p.m.)  o For questions about schools and childcare: Call 610-840-6355 or 1-305.511.6609 (7 a.m. to 7 p.m.)       Additional Information    Negative: Passed out (i.e., lost consciousness, collapsed and was not  responding)    Negative: Shock suspected (e.g., cold/pale/clammy skin, too weak to stand, low BP, rapid pulse)    Negative: Sounds like a life-threatening emergency to the triager    Negative: [1] SEVERE back pain (e.g., excruciating) AND [2] sudden onset AND [3] age > 60    Negative: [1] Unable to urinate (or only a few drops) > 4 hours AND [2] bladder feels very full (e.g., palpable bladder or strong urge to urinate)    Negative: [1] Loss of bladder or bowel control (urine or bowel incontinence; wetting self, leaking stool) AND [2] new onset    Negative: Numbness in groin or rectal area (i.e., loss of sensation)    Negative: [1] SEVERE abdominal pain AND [2] present > 1 hour    Negative: [1] Abdominal pain AND [2] age > 60    Negative: Weakness of a leg or foot (e.g., unable to bear weight, dragging foot)    Negative: Unable to walk    Negative: Patient sounds very sick or weak to the triager    Negative: [1] SEVERE back pain (e.g., excruciating, unable to do any normal activities) AND [2] not improved 2 hours after pain medicine    [1] Pain radiates into the thigh or further down the leg AND [2] both legs    Protocols used: BACK PAIN-A-

## 2021-06-12 NOTE — TELEPHONE ENCOUNTER
If her back pain is not better, we should have her see [facility].  I can put in a referral now.  I'm not going to renew prednisone, it's not good to take that for extended periods of time.

## 2021-06-12 NOTE — PATIENT INSTRUCTIONS - HE
Ordered x-rays of your low back and pelvis.  Once I reviewed images I will have one of our nurses give you a call with results and recommendations.    Ordered cyclobenzaprine 10 mg you can take 3 times a day as needed.    I also ordered naproxen 500 mg you can take twice daily as needed with food.    Recommend that you trial lidocaine patches that you can purchase over-the-counter at the pharmacy.    Recommend that you alternate between ice and heat.    ~Please call Nurse Navigation line [phone] with any questions or concerns about your treatment plan, if symptoms worsen and you would like to be seen urgently, or if you have problems controlling bladder and bowel function.

## 2021-06-12 NOTE — TELEPHONE ENCOUNTER
I saw pt yesterday at 11 am, add-on, regarding her back pain, so I think this message is taken care of.

## 2021-06-13 NOTE — PROGRESS NOTES
[facility] Rehabilitation Daily Progress     Patient Name: Kendra Vaca  Date: 12/7/2020  Visit #: 2/12 until 3/4/2021  PTA visit #:  na  Referral Diagnosis:  Acute bilateral low back pain without sciatica  Referring provider: Chico Ca*  Visit Diagnosis:     ICD-10-CM    1. Acute bilateral low back pain without sciatica  M54.5    2. Generalized muscle weakness  M62.81        Past Medical History:   Diagnosis Date     Ankle fracture 1/16/2015     History of biliary T-tube placement 1/12/2015    On MRI performed on 07/13/2014, she was found to have a C3-C4 large posterior disk herniation, slightly more on the right than on the left, with severe spinal canal stenosis and cord compression and extensive edema in the spinal cord from the upper C3 through mid C5.   MRI CERVICAL SPINE WITHOUT CONTRAST 7/13/2014 6:48 PM  HISTORY: Neck injury. Right neck and arm pain. Bilateral back pain. Tinglin         Assessment:     HEP/POC compliance is  fair .  Response to Intervention Limited tolerance to exercise and manual therapy at this time due to intensity of pain. Relief with TENS unit while on today.   Patient is appropriate to continue with skilled physical therapy intervention, as indicated by initial plan of care.    Goal Status:  Pt. will demonstrate/verbalize independence in self-management of condition in : 6 weeks  Pt. will be independent with home exercise program in : 6 weeks    Pt will: improve VANIA by 15 points or more to show significant improvement in low back pain impacting daily activities, within 10 visits.  Pt will: report being able to stand for greater than 10 minutes before needing to sit due to pain, to be able to perform house work and personal care, within 10 visits.      Plan / Patient Education:     Continue with initial plan of care.  Progress with home program as tolerated. Continue with TENS as needed, manual therapy as tolerated, progress nerve glides,     Subjective:      Pain Rating: 10  Pt is going to get an injection tomorrow at [facility]. She has only slept for about 4 hours total over the past 3-4 nights. Pt reports they did an MRI that shows disc herniation at L5-S1.       Objective:     Pt reports pain at 7-8/10 with TENS on  Guarded/painful transitions from supine<>sit, sit<>stand    Exercises:  Exercise #1: LTR - too painful  Comment #1: knee to chest- too painful  Exercise #2: cat cow modified, seated or standing- has been able to try this one        Treatment Today     TREATMENT MINUTES COMMENTS   Evaluation     Self-care/ Home management     Manual therapy 8 (with TENS unit on) -Very gentle STM over lumbar and lower thoracic spine- limited tolerance due to pain levels   Neuromuscular Re-education     Therapeutic Activity     Therapeutic Exercises 3 -see exercise flow sheet for date completed   Gait training     Modality__________________ 22+3 set up -Empi TENS unit placed with 4 large electrodes on patients lumbar spine, skin prepped, intensity per pt tolerance in sidelying position and low back setting               Total 28    Blank areas are intentional and mean the treatment did not include these items.       Gissel Weiss, PT, DPT, CLT-STEFANY  12/7/2020

## 2021-06-13 NOTE — PROGRESS NOTES
[facility] Rehabilitation Daily Progress     Patient Name: Kendra Vaca  Date: 12/14/2020  Visit #: 3/12 until 3/4/2021  PTA visit #:  na  Referral Diagnosis:  Acute bilateral low back pain without sciatica  Referring provider: Chico Ca*  Visit Diagnosis:     ICD-10-CM    1. Acute bilateral low back pain without sciatica  M54.5    2. Generalized muscle weakness  M62.81        Past Medical History:   Diagnosis Date     Ankle fracture 1/16/2015     History of biliary T-tube placement 1/12/2015    On MRI performed on 07/13/2014, she was found to have a C3-C4 large posterior disk herniation, slightly more on the right than on the left, with severe spinal canal stenosis and cord compression and extensive edema in the spinal cord from the upper C3 through mid C5.   MRI CERVICAL SPINE WITHOUT CONTRAST 7/13/2014 6:48 PM  HISTORY: Neck injury. Right neck and arm pain. Bilateral back pain. Tinglin         Assessment:     HEP/POC compliance is  good .  Response to Intervention Pt overall reporting significant decrease in pain after her injections. She still presents with central sensitization of pain and provided with TNE education to help reduce pain.  Patient is appropriate to continue with skilled physical therapy intervention, as indicated by initial plan of care.    Goal Status:  Pt. will demonstrate/verbalize independence in self-management of condition in : 6 weeks  Pt. will be independent with home exercise program in : 6 weeks    Pt will: improve VANIA by 15 points or more to show significant improvement in low back pain impacting daily activities, within 10 visits.  Pt will: report being able to stand for greater than 10 minutes before needing to sit due to pain, to be able to perform house work and personal care, within 10 visits.      Plan / Patient Education:     Continue with initial plan of care.  Progress with home program as tolerated. TNE education as needed, slow progression of  abdominal strengthening, nerve glides as able    Subjective:     Pain Rating: 3 R buttocks numbness  Pt reports significant improvement after the injections. She still has increased pain with standing prolonged times and sleeping at night. 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       Objective:     Lumbar AROM:  Flexion: to ankles with increased pain  Ext: mild  Rotation: R severe with oaub, L mod    Standing ext: 5 reps- increased numbness/tingling in mirta lower legs    Attempted seated ankle DF/PF 5x with increased n/t, pt requires cues for diaphragmatic breathing     Exercises:  Exercise #1: LTR - too painful  Comment #1: knee to chest- too painful  Exercise #2: cat cow modified, seated or standing- has been able to try this one        Treatment Today     TREATMENT MINUTES COMMENTS   Evaluation     Self-care/ Home management     Manual therapy     Neuromuscular Re-education 15 -KTape with 3 I strips along lumbar paraspinals and across at L5-S1, skin prepped and educated on precautions/indications for use  -Education: Patient introduced to the topic of pain neuroscience education and that improving knowledge of how pain works promotes improved recovery and rehabilitation. Current knowledge and understanding of patient on pain related topics was explored to create baseline.   -Education: Patient educated on the concept of the nervous system as the bodies alarm system, and the role of nociception to warn the body of danger. Peripheral nerve sensitization, hyperalgesia and allodynia were explained using metaphors to promote deep learning.  -educated on activity modification and epsom salt bath for continued pain reduction and decreased central sensitization     Therapeutic Activity     Therapeutic Exercises 10 -see exercise flow sheet for date completed     Gait training     Modality__________________                Total 25    Blank areas are intentional and  mean the treatment did not include these items.       Gissel Weiss, PT, DPT, CLT-STEFANY  12/14/2020

## 2021-06-13 NOTE — TELEPHONE ENCOUNTER
----- Message from Chico Ca, DO sent at 11/10/2020 12:20 PM CST -----  Please call the patient let her know I reviewed x-rays of her low back and her pelvis.  There are no fractures.  There is some arthritis in the low back.  I would recommend physical therapy at this time.  I am happy to place an order for this if she would like.  I would like to see her 4 weeks after she has been doing physical therapy.

## 2021-06-13 NOTE — TELEPHONE ENCOUNTER
Call to pt with provider's results and recommendations. Pt stated understanding. Pt ok with referral to physical therapy. She would like to go through [facility] at the [place] location. Pt aware that order will be placed and she will be contacted to schedule.

## 2021-06-13 NOTE — PROGRESS NOTES
[facility] Rehabilitation Daily Progress     Patient Name: Kendra Vaca  Date: 12/21/2020  Visit #: 4/12 until 3/4/2021  PTA visit #:  na  Referral Diagnosis:  Acute bilateral low back pain without sciatica  Referring provider: Chico Ca*  Visit Diagnosis:     ICD-10-CM    1. Acute bilateral low back pain without sciatica  M54.5    2. Generalized muscle weakness  M62.81        Past Medical History:   Diagnosis Date     Ankle fracture 1/16/2015     History of biliary T-tube placement 1/12/2015    On MRI performed on 07/13/2014, she was found to have a C3-C4 large posterior disk herniation, slightly more on the right than on the left, with severe spinal canal stenosis and cord compression and extensive edema in the spinal cord from the upper C3 through mid C5.   MRI CERVICAL SPINE WITHOUT CONTRAST 7/13/2014 6:48 PM  HISTORY: Neck injury. Right neck and arm pain. Bilateral back pain. Tinglin         Assessment:     HEP/POC compliance is  fair .  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 Reports relief with TENS unit on today.  Patient is appropriate to continue with skilled physical therapy intervention, as indicated by initial plan of care.  Overall, limited tolerance to exercise/activity due to intensity of pain.    Goal Status:  Pt. will demonstrate/verbalize independence in self-management of condition in : 6 weeks  Pt. will be independent with home exercise program in : 6 weeks    Pt will: improve VANIA by 15 points or more to show significant improvement in low back pain impacting daily activities, within 10 visits.  Pt will: report being able to stand for greater than 10 minutes before needing to sit due to pain, to be able to perform house work and personal care, within 10 visits.      Plan / Patient Education:     Continue with initial plan of care.  Progress with home program as tolerated. TNE education as needed, slow progression of  abdominal strengthening, nerve glides as able    Subjective:     Pain Ratin mirta lumbar spine and R anterior hip    Pt was cleaning a house this morning and is in more pain now. Pt gets back in with [facility]. She still has the kinesiotape on from last session.       Objective:     Lumbar AROM:  Flexion: to ankles with increased pain  Ext: mild  Rotation: R severe with pain, L mod    Pt reports pain relief with TENS unit on and educated on how to purchase for at home use (pt believes she has a friend who can lend her one)    Exercises:  Exercise #1: LTR - too painful  Comment #1: knee to chest- too painful  Exercise #2: cat cow modified, seated or standing- has been able to try this one  Comment #2: seated TA set 5x, gentle throughout day        Treatment Today     TREATMENT MINUTES COMMENTS   Evaluation     Self-care/ Home management     Manual therapy     Neuromuscular Re-education 8 -KTape with 3 I strips along lumbar paraspinals and across at L5-S1, skin prepped and educated on precautions/indications for use  -Continued education on nerve sensitivity and activity modifications to help reduce pain   Therapeutic Activity     Therapeutic Exercises     Gait training     Modality___TENS_______________ 17+3 set up -Empi TENS unit placed with 4 electrodes on patients lumbar spine, skin prepped, intensity per pt tolerance in seated position              Total 28    Blank areas are intentional and mean the treatment did not include these items.       Gissel Weiss, PT, DPT, CLT-STEFANY  2020

## 2021-06-14 NOTE — PROGRESS NOTES
BOTULINUM TOXIN PROCEDURE NOTE    Chief Complaint   Patient presents with     Dystonia     Botox     UMP RETURN BOTOX CERVICAL DYSTONIA     Pulse 74   Temp 97.7  F (36.5  C) (Oral)   Resp 16   LMP 02/15/2012   SpO2 97%     Current Outpatient Medications:      acetaminophen (TYLENOL) 500 MG tablet, Take 500-1,000 mg by mouth every 6 hours as needed for mild pain (can take 4 per day), Disp: , Rfl:      azelastine (OPTIVAR) 0.05 % SOLN, Place 1 drop into both eyes as needed , Disp: , Rfl:      EPINEPHrine (EPIPEN JR) 0.15 MG/0.3ML injection, Inject 0.15 mg into the muscle as needed for anaphylaxis, Disp: , Rfl:      ibuprofen (ADVIL/MOTRIN) 800 MG tablet, Take 800 mg by mouth, Disp: , Rfl:      ketoconazole (NIZORAL) 2 % external cream, , Disp: , Rfl:      penciclovir (DENAVIR) 1 % cream, Apply 1 g topically every 2 hours, Disp: , Rfl:      Allergies   Allergen Reactions     Ciprofloxacin Anaphylaxis and Other (See Comments)     Throat swelling       Morphine Sulfate Hives     Seafood Hives     Shellfish-Derived Products Anaphylaxis     Latex      CONTACT RASH WITH LATEX PRODUCTS IN THE HEAT OF SUMMER        PHYSICAL EXAM:  HEAD, NECK AND TRUNK PATTERN:   Continuous multidirectional tremor  Right rotation  Left side bending  Slightly decreased ROM with head turning to left  Pt reports excessive pain at right neck.    HPI:    Patient denies new medical diagnoses, illnesses, hospitalizations, emergency room visits, and injuries since the previous injection with botulinum neurotoxin.     No new hospital visits or ER visits since last appointment. She did have COVID since the last injections, and is 2 weeks out from her first COVID vaccine. Her next dose is in 1 week.    We reviewed the recommended safety guidelines for  Botox from any vaccine injection, such as the seasonal flu vaccine, by a minimum of 10-14 days with Kendra Vaca. She acknowledged understanding.      RESPONSE TO PREVIOUS  TREATMENT:  Kendra Vaca received 200 units of Botox on 3/23/2021.    Problems following the previous series of neurotoxin injections included:  No problems reported  Slight fatigue.    BENEFITS BY PATIENT REPORT:  Pain Improvement: Yes.  Percent Improvement: 90%  Duration of Benefit: 11 weeks followed by a gradual reduction in benefit.      Tremors and dystonia Improvement: Yes.  Percent Improvement: 90% Duration of Benefit: 11 weeks followed by a gradual reduction in benefit. More noticeable pain and tremors over the last week.         BOTULINUM NEUROTOXIN INJECTION PROCEDURES:    VERIFICATION OF PATIENT IDENTIFICATION AND PROCEDURE     Initials   Patient Name cdm   Patient  cdm   Procedure Verified by: cdm     Prior to the start of the procedure and with procedural staff participation, I verbally confirmed the patient s identity using two indicators, relevant allergies, that the procedure was appropriate and matched the consent or emergent situation, and that the correct equipment/implants were available. Immediately prior to starting the procedure I conducted the Time Out with the procedural staff and re-confirmed the patient s name, procedure, and site/side. (The Joint Commission universal protocol was followed.)  Yes    Sedation (Moderate or Deep): None      Above assessments performed by:  Resident: Jose Francisco Ruano MD   The attending was present for the entire procedure.    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     Her baseline symptoms have been recalcitrant to oral medications and conservative therapy.  She is here today for an injection of Botox.        GOAL OF  PROCEDURE:  The goal of this procedure is to increase active range of motion, improve volitional motor control, decrease pain  and enhance functional independence associated with dystonic movements.      TOTAL DOSE ADMINISTERED:  Dose Administered:  200 units Botox    Diluent Used:  Preservative Free Normal Saline  Total Volume of Diluent Used:  2 ml  Lot # /C4 with Expiration Date: 10/2023  NDC #: Botox 100u ([address]-30)    Medication guide was offered to patient and was declined.    CONSENT:  The risks, benefits, and treatment options were discussed with Kendra De Leónbartolo and she agreed to proceed.      Written consent was obtained by cd.     EQUIPMENT USED:  Needle-37mm stimulating/recording  EMG/NCS Machine    SKIN PREPARATION:  Skin preparation was performed using an alcohol wipe.    GUIDANCE DESCRIPTION:  Electro-myographic guidance was necessary throughout the procedure to accurately identify all areas of dystonic muscles while avoiding injection of non-dystonic muscles, neighboring nerves and nearby vascular structures.       AREA/MUSCLE INJECTED:  200 UNITS BOTOX = TOTAL DOSE    1. HEAD & NECK MUSCLES: Total dose = 200 units Botox, 1:1 Dilution      Right Mid-Trapezius - 25 units of Botox at 1 site/s.   Left Mid-Trapezius - 15 units of Botox at 1 site/s.      Right lateral Trapezius - 30 units of Botox at 1 site.  Left lateral Trapezius - 10 units of Botox at 1 site.      Right Splenius Cervicis - 30 units of Botox at 1 site/s.   Left Splenius Cervicis - 15 units of Botox at 1 site/s.      Right Levator Scapulae - 25 units of Botox at 1 site/s (shoulder muscles).   Left Levator Scapulae - 10 units of Botox at 1 site/s (shoulder muscles).      Right Longissimus Capitis - 5 units of Botox at 1 site/s.       Right Inferior Obliquus Capitis - 5 units of Botox at 1 site/s.                  Right Sternocleidomastoid - 20 units of Botox at 1 site/s.               Left Sternocleidomastoid - 10 units of  Botox at 1 site/s.       RESPONSE TO PROCEDURE:  Kendra Vaca tolerated the procedure well and there were no immediate complications.  She was allowed to recover for an appropriate period of time and was discharged home in stable condition.    FOLLOW UP:  Kendra Vaca was asked to follow up by phone in 7-14 days with Gissel Holt PT  to report her response to this series of injections.  Based on the patient's previous response to this therapy, Kendra Vaca was rescheduled for the next series of injections in 12 weeks.    PLAN (Medication Changes, Therapy Orders, Work or Disability Issues, etc.): Patient will monitor her response to today's injections and report.

## 2021-06-14 NOTE — PROGRESS NOTES
[facility] Rehabilitation Daily Progress     Patient Name: Kendra Vaca  Date: 12/28/2020  Visit #: 5/12 until 3/4/2021  PTA visit #:  na  Referral Diagnosis:  Acute bilateral low back pain without sciatica  Referring provider: Chico Ca*  Visit Diagnosis:     ICD-10-CM    1. Acute bilateral low back pain without sciatica  M54.5    2. Generalized muscle weakness  M62.81        Past Medical History:   Diagnosis Date     Ankle fracture 1/16/2015     History of biliary T-tube placement 1/12/2015    On MRI performed on 07/13/2014, she was found to have a C3-C4 large posterior disk herniation, slightly more on the right than on the left, with severe spinal canal stenosis and cord compression and extensive edema in the spinal cord from the upper C3 through mid C5.   MRI CERVICAL SPINE WITHOUT CONTRAST 7/13/2014 6:48 PM  HISTORY: Neck injury. Right neck and arm pain. Bilateral back pain. [place]         Assessment:     HEP/POC compliance is  fair .  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 Reports relief with TENS unit on today.  Patient is appropriate to continue with skilled physical therapy intervention, as indicated by initial plan of care.  Overall, limited tolerance to exercise/activity due to intensity of pain.    Unable to tolerate exercise, trialed today, increase in pain.    Goal Status:  Pt. will demonstrate/verbalize independence in self-management of condition in : 6 weeks  Pt. will be independent with home exercise program in : 6 weeks    Pt will: improve VANIA by 15 points or more to show significant improvement in low back pain impacting daily activities, within 10 visits.  Pt will: report being able to stand for greater than 10 minutes before needing to sit due to pain, to be able to perform house work and personal care, within 10 visits.      Plan / Patient Education:     Continue with initial plan of care.  Progress with home  "program as tolerated. TNE education as needed, slow progression of abdominal strengthening, nerve glides as able    Subjective:     Pain Ratin/10    Cleaned this morning and had to walk to car, pain is increased.    Patient reports she is going in for shots at [facility], on the 5th 2 more to L5. They told her that there is a ripped disc and a bulging disc. And they told her \"not to move\" after the shot, to avoid getting surgery.     Right now using a TENs unit from friend, often. Hoping to get one from insurance. Will contact doctor about this.       Objective:     Lumbar AROM:  Flexion: to ankles with increased pain  Ext: mild  Rotation: R severe with pain, L mod    Pt reports pain relief with TENS unit on and educated on how to purchase for at home use (pt believes she has a friend who can lend her one)    Exercises:  Exercise #1: LTR - too painful  Comment #1: knee to chest- too painful  Exercise #2: cat cow modified, seated or standing- has been able to try this one  Comment #2: seated TA set 5x, gentle throughout day        Treatment Today     TREATMENT MINUTES COMMENTS   Evaluation     Self-care/ Home management     Manual therapy     Neuromuscular Re-education     Therapeutic Activity     Therapeutic Exercises 11 Trial of exercises, patient able to tolerate about one repetition of each exercise limited by pain.   Gait training     Modality___TENS_______________ 17+3 set up -Empi TENS unit placed with 4 electrodes on patients lumbar spine, skin prepped, intensity per pt tolerance in seated position              Total 28    Blank areas are intentional and mean the treatment did not include these items.       Kyra Flowers, PT, DPT  2020  "

## 2021-06-15 ENCOUNTER — OFFICE VISIT (OUTPATIENT)
Dept: PHYSICAL MEDICINE AND REHAB | Facility: CLINIC | Age: 61
End: 2021-06-15
Payer: MEDICARE

## 2021-06-15 VITALS — OXYGEN SATURATION: 97 % | HEART RATE: 74 BPM | RESPIRATION RATE: 16 BRPM | TEMPERATURE: 97.7 F

## 2021-06-15 DIAGNOSIS — G24.3 SPASMODIC TORTICOLLIS: Primary | ICD-10-CM

## 2021-06-15 DIAGNOSIS — G24.1 GENETIC TORSION DYSTONIA: ICD-10-CM

## 2021-06-15 PROCEDURE — 64642 CHEMODENERV 1 EXTREMITY 1-4: CPT | Mod: GC | Performed by: PHYSICAL MEDICINE & REHABILITATION

## 2021-06-15 PROCEDURE — 64643 CHEMODENERV 1 EXTREM 1-4 EA: CPT | Mod: GC | Performed by: PHYSICAL MEDICINE & REHABILITATION

## 2021-06-15 PROCEDURE — 64616 CHEMODENERV MUSC NECK DYSTON: CPT | Mod: RT | Performed by: PHYSICAL MEDICINE & REHABILITATION

## 2021-06-15 PROCEDURE — 95874 GUIDE NERV DESTR NEEDLE EMG: CPT | Mod: GC | Performed by: PHYSICAL MEDICINE & REHABILITATION

## 2021-06-15 NOTE — PROGRESS NOTES
[facility] Rehabilitation Discharge Summary  Patient Name: Kendra Vaca  Date: 2/8/2021  Referral Diagnosis:Acute bilateral low back pain without sciatica  Referring provider: Chico Ca*  Visit Diagnosis:   1. Acute bilateral low back pain without sciatica     2. Generalized muscle weakness         Goals:  Pt. will demonstrate/verbalize independence in self-management of condition in : 6 weeks  Pt. will be independent with home exercise program in : 6 weeks    Pt will: improve VANIA by 15 points or more to show significant improvement in low back pain impacting daily activities, within 10 visits.  Pt will: report being able to stand for greater than 10 minutes before needing to sit due to pain, to be able to perform house work and personal care, within 10 visits.      Patient was seen for 5 visits from 12/4/2020 to 12/28/2020 with 0 missed appointments.  The patient attended therapy initially, but did not finish the therapy sessions prescribed.  Goals were not fully achieved. Explanation for goals not achieved: Pt's pain limited progress towards goals and pt did not complete recommended PT POC.  Patient received a home program Unable to progress due to high pain levels.    Therapy will be discontinued at this time.  The patient will need a new referral to resume.    Thank you for your referral.  Gissel Weiss, PT, DPT, CLT-STEFANY  2/8/2021  11:39 AM

## 2021-06-15 NOTE — LETTER
6/15/2021       RE: Kendra Vaca  [address] [place] [address]     Dear Colleague,    Thank you for referring your patient, Kendra Vaca, to the [facility] [place] at [facility]. Please see a copy of my visit note below.    BOTULINUM TOXIN PROCEDURE NOTE    Chief Complaint   Patient presents with     Dystonia     Botox     [facility] RETURN BOTOX CERVICAL DYSTONIA     Pulse 74   Temp 97.7  F (36.5  C) (Oral)   Resp 16   LMP 02/15/2012   SpO2 97%     Current Outpatient Medications:      acetaminophen (TYLENOL) 500 MG tablet, Take 500-1,000 mg by mouth every 6 hours as needed for mild pain (can take 4 per day), Disp: , Rfl:      azelastine (OPTIVAR) 0.05 % SOLN, Place 1 drop into both eyes as needed , Disp: , Rfl:      EPINEPHrine (EPIPEN JR) 0.15 MG/0.3ML injection, Inject 0.15 mg into the muscle as needed for anaphylaxis, Disp: , Rfl:      ibuprofen (ADVIL/MOTRIN) 800 MG tablet, Take 800 mg by mouth, Disp: , Rfl:      ketoconazole (NIZORAL) 2 % external cream, , Disp: , Rfl:      penciclovir (DENAVIR) 1 % cream, Apply 1 g topically every 2 hours, Disp: , Rfl:      Allergies   Allergen Reactions     Ciprofloxacin Anaphylaxis and Other (See Comments)     Throat swelling       Morphine Sulfate Hives     Seafood Hives     Shellfish-Derived Products Anaphylaxis     Latex      CONTACT RASH WITH LATEX PRODUCTS IN THE HEAT OF SUMMER        PHYSICAL EXAM:  HEAD, NECK AND TRUNK PATTERN:   Continuous multidirectional tremor  Right rotation  Left side bending  Slightly decreased ROM with head turning to left  Pt reports excessive pain at right neck.    HPI:    Patient denies new medical diagnoses, illnesses, hospitalizations, emergency room visits, and injuries since the previous injection with botulinum neurotoxin.     No new hospital visits or ER visits since last appointment. She did have COVID since the  last injections, and is 2 weeks out from her first COVID vaccine. Her next dose is in 1 week.    We reviewed the recommended safety guidelines for  Botox from any vaccine injection, such as the seasonal flu vaccine, by a minimum of 10-14 days with Kendra Vaca. She acknowledged understanding.      RESPONSE TO PREVIOUS TREATMENT:  Kendra Vaca received 200 units of Botox on 3/23/2021.    Problems following the previous series of neurotoxin injections included:  No problems reported  Slight fatigue.    BENEFITS BY PATIENT REPORT:  Pain Improvement: Yes.  Percent Improvement: 90%  Duration of Benefit: 11 weeks followed by a gradual reduction in benefit.      Tremors and dystonia Improvement: Yes.  Percent Improvement: 90% Duration of Benefit: 11 weeks followed by a gradual reduction in benefit. More noticeable pain and tremors over the last week.         BOTULINUM NEUROTOXIN INJECTION PROCEDURES:    VERIFICATION OF PATIENT IDENTIFICATION AND PROCEDURE     Initials   Patient Name cdm   Patient  cdm   Procedure Verified by: cdm     Prior to the start of the procedure and with procedural staff participation, I verbally confirmed the patient s identity using two indicators, relevant allergies, that the procedure was appropriate and matched the consent or emergent situation, and that the correct equipment/implants were available. Immediately prior to starting the procedure I conducted the Time Out with the procedural staff and re-confirmed the patient s name, procedure, and site/side. (The Joint Commission universal protocol was followed.)  Yes    Sedation (Moderate or Deep): None      Above assessments performed by:  Resident: Jose Francisco Ruano MD   The attending was present for the entire procedure.    Virginia Rosas MD     INDICATION/S FOR PROCEDURE/S:  Kendra Vaca is a 61 year old patient with dystonia affecting the  head, neck and shoulder girdle musculature secondary to a  diagnosis of spasmodic torticollis with associated  pain, tremor, spasms, loss of joint motion, loss of volitional motor control and difficulty with activities of daily living.     Her baseline symptoms have been recalcitrant to oral medications and conservative therapy.  She is here today for an injection of Botox.        GOAL OF PROCEDURE:  The goal of this procedure is to increase active range of motion, improve volitional motor control, decrease pain  and enhance functional independence associated with dystonic movements.      TOTAL DOSE ADMINISTERED:  Dose Administered:  200 units Botox    Diluent Used:  Preservative Free Normal Saline  Total Volume of Diluent Used:  2 ml  Lot # /C4 with Expiration Date: 10/2023  NDC #: Botox 100u ([address]-47)    Medication guide was offered to patient and was declined.    CONSENT:  The risks, benefits, and treatment options were discussed with Kendra De Leónbartolo and she agreed to proceed.      Written consent was obtained by cd.     EQUIPMENT USED:  Needle-37mm stimulating/recording  EMG/NCS Machine    SKIN PREPARATION:  Skin preparation was performed using an alcohol wipe.    GUIDANCE DESCRIPTION:  Electro-myographic guidance was necessary throughout the procedure to accurately identify all areas of dystonic muscles while avoiding injection of non-dystonic muscles, neighboring nerves and nearby vascular structures.       AREA/MUSCLE INJECTED:  200 UNITS BOTOX = TOTAL DOSE    1. HEAD & NECK MUSCLES: Total dose = 200 units Botox, 1:1 Dilution      Right Mid-Trapezius - 25 units of Botox at 1 site/s.   Left Mid-Trapezius - 15 units of Botox at 1 site/s.      Right lateral Trapezius - 30 units of Botox at 1 site.  Left lateral Trapezius - 10 units of Botox at 1 site.      Right Splenius Cervicis - 30 units of Botox at 1 site/s.   Left Splenius Cervicis - 15 units of Botox at 1 site/s.      Right Levator Scapulae - 25 units of Botox at 1 site/s (shoulder muscles).   Left  Levator Scapulae - 10 units of Botox at 1 site/s (shoulder muscles).      Right Longissimus Capitis - 5 units of Botox at 1 site/s.       Right Inferior Obliquus Capitis - 5 units of Botox at 1 site/s.                  Right Sternocleidomastoid - 20 units of Botox at 1 site/s.               Left Sternocleidomastoid - 10 units of Botox at 1 site/s.       RESPONSE TO PROCEDURE:  Kendra Vaca tolerated the procedure well and there were no immediate complications.  She was allowed to recover for an appropriate period of time and was discharged home in stable condition.    FOLLOW UP:  Kendra Vaca was asked to follow up by phone in 7-14 days with Gissel Holt PT  to report her response to this series of injections.  Based on the patient's previous response to this therapy, Kendra Vaca was rescheduled for the next series of injections in 12 weeks.    PLAN (Medication Changes, Therapy Orders, Work or Disability Issues, etc.): Patient will monitor her response to today's injections and report.       Again, thank you for allowing me to participate in the care of your patient.      Sincerely,    Virginia Rosas MD

## 2021-06-15 NOTE — NURSING NOTE
Chief Complaint   Patient presents with     Dystonia     Botox     [facility] RETURN BOTOX CERVICAL DYSTONIA       Angel Vale, EMT

## 2021-06-17 NOTE — TELEPHONE ENCOUNTER
Coronavirus (COVID-19) Notification     Reason for call  Patient requesting results     Lab Result    Lab test 2019-nCoV rRt-PCR in process        RN Recommendations/Instructions per [facility]  Continue quarantee and following instructions until you receive the results     Please Contact your PCP clinic or return to the Emergency department if your:    Symptoms worsen or other concerning symptom's.    Patient informed that if test for COVID19 is POSITIVE, you will receive a call typically within 48 hours from the test date (date lab collected).  If NEGATIVE result, you will receive a letter in the mail or [employer].      Trini Mackay LPN

## 2021-06-18 NOTE — PATIENT INSTRUCTIONS - HE
Patient Instructions by Kyra Flowers PT at 12/4/2020  8:30 AM     Author: Kyra Flowers PT Service: -- Author Type: Physical Therapist    Filed: 12/4/2020  8:42 AM Encounter Date: 12/4/2020 Status: Signed    : Kyra Flowers PT (Physical Therapist)        LOWER TRUNK ROTATIONS - LTR    Lying on your back with your knees bent, gently move your knees side-to-side.      x20 both directions, 2-3 times per day      SINGLE KNEE TO CHEST STRETCH - SKTC    While Lying on your back,  hold your knee and gently pull it up towards your chest.      Hold 30-60 sec, 2-3 times per day          CAT AND COW    While on your hands and knees in a crawl position, raise up your back and arch it towards the ceiling like an angry cat.    Next return to a lowered position and arch your back the opposite direction.    x10-20 repetitions, 2-3 times per day    Can do in standing or seated
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
## 2021-06-19 NOTE — LETTER
Letter by Arpit Hernandez MD at      Author: Arpit Hernandze MD Service: -- Author Type: --    Filed:  Encounter Date: 5/15/2019 Status: (Other)         Kendra Vaca  [address] [place] [address]             May 15, 2019        Dear Ms. Vaca,    Below are the results from your recent visit:    Resulted Orders   Basic Metabolic Panel   Result Value Ref Range    Sodium 140 136 - 145 mmol/L    Potassium 4.1 3.5 - 5.0 mmol/L    Chloride 104 98 - 107 mmol/L    CO2 25 22 - 31 mmol/L    Anion Gap, Calculation 11 5 - 18 mmol/L    Glucose 87 70 - 125 mg/dL    Calcium 9.6 8.5 - 10.5 mg/dL    BUN 11 8 - 22 mg/dL    Creatinine 0.77 0.60 - 1.10 mg/dL    GFR MDRD Af Amer >60 >60 mL/min/1.73m2    GFR MDRD Non Af Amer >60 >60 mL/min/1.73m2    Narrative    Fasting Glucose reference range is 70-99 mg/dL per  American Diabetes Association (ADA) guidelines.       All test results are normal or not clinically relevant.      Please call with questions or contact us using [employer].    Sincerely,        Electronically signed by Arpit Hernandez MD

## 2021-06-30 NOTE — PROGRESS NOTES
Progress Notes by Kyra Flowers PT at 12/4/2020  8:30 AM     Author: Kyra Flowers PT Service: -- Author Type: Physical Therapist    Filed: 12/4/2020  9:23 AM Encounter Date: 12/4/2020 Status: Attested    : Kyra Flowers PT (Physical Therapist) Cosigner: Chico Ca DO at 12/4/2020  9:26 AM    Attestation signed by Chico Ca DO at 12/4/2020  9:26 AM    I agree with [facility] Certification Request    December 4, 2020      Patient: Kendra Vaca  MR Number: [id number]  YOB: 1960  Date of Visit: 12/4/2020      Dear Chico Horn*:    Thank you for this referral.   We are seeing Kendra Vaca in Physical Therapy for low back pain.    Medicare and/or Medicaid requires physician review and approval of the treatment plan. Please review the plan of care and verify that you agree with the therapy plan of care by co-signing this note.      Plan of Care  Authorization / Certification Start Date: 12/04/20  Authorization / Certification End Date: 03/04/21  Authorization / Certification Number of Visits: 12  Communication with: Referral Source  Patient Related Instruction: Nature of Condition;Self Care instruction;Treatment plan and rationale;Basis of treatment;Body mechanics;Precautions;Posture;Next steps;Expected outcome  Times per Week: 1-2  Number of Weeks: 6-12  Number of Visits: up to 12  Discharge Planning: when goals are met or patient is independent in management of condition  Therapeutic Exercise: ROM;Stretching;Strengthening  Neuromuscular Reeducation: balance/proprioception;posture;TNE;core;kinesio tape  Manual Therapy: soft tissue mobilization;myofascial release;joint mobilization;muscle energy  Modalities: TENS  Gait Training: as indicated      Goals:  Pt. will demonstrate/verbalize independence in self-management of condition in : 6 weeks  Pt. will be independent with home exercise program  in : 6 weeks    Pt will: improve VANIA by 15 points or more to show significant improvement in low back pain impacting daily activities, within 10 visits.  Pt will: report being able to stand for greater than 10 minutes before needing to sit due to pain, to be able to perform house work and personal care, within 10 visits.        If you have any questions or concerns, please don't hesitate to call.    Sincerely,      Kyra Flowers, PT, DPT        Physician recommendation:     ___ Follow therapist's recommendation        ___ Modify therapy      *Physician co-signature indicates they certify the need for these services furnished within this plan and while under their care.        [facility]   [place]-Pelvic Initial Evaluation    Patient Name: Kendra Vaca  Date of evaluation: 12/4/2020  Referral Diagnosis: Acute bilateral low back pain without sciatica  Referring provider: Chico Ca*  Visit Diagnosis:     ICD-10-CM    1. Acute bilateral low back pain without sciatica  M54.5    2. Generalized muscle weakness  M62.81        Assessment:      Kendra Vaca is a 60 y.o. female who presents to therapy today with chief complaints of low back pain and leg pain bilaterally, with an inability to perform daily activity. Onset date of sx was about a month ago when she slipped and fell and landed on her behind a couple days later a coffee pot fell on her back.  Pt reported h/o imaging performed by [facility], results below. No fractures present.  Pain symptoms are very high, sharp and limiting in all activities.  Functional impairments include sitting, standing, walking, bathing, dressing, sleeping, housework, etc.  Pt demo's signs and sx consistent with low back pain with very high sensitivity to pain, negative nerve tension testing on this date. Exam very limited by pain, weakness likely due to high pain level. Treatment was initiated focusing on gentle manual therapy with some relief, use  of TENs unit with relief of pain, and initiation of gentle mobility exercises for the low back. Patient is motivated and willing to participate in physical therapy treatment.  The POC is dynamic and will be modified on an ongoing basis.  Barriers to achieving goals as noted in the assessment section may affect outcome.  Prognosis to achieve goals is  good   Pt. is appropriate for skilled PT intervention as outlined in the Plan of Care (POC).  Pt. is a good candidate for skilled PT services to improve pain levels and function.    Plan of care and goals were established in collaboration with patient.       MRI  FINDINGS: Five lumbar-type vertebral bodies. The conus medullaris terminates at the level of the L1 vertebra.  Normal cord signal.  Normal marrow signal.  Lumbar levocurvature. Trace grade 1 retrolisthesis of L1 on L2 and L2 on L3. The visualized paraspinal structures are unremarkable.     IMPRESSION:      1. Multilevel lumbar spondylosis and facet arthrosis with moderate canal stenosis at L4-L5.    2. A left-sided broad-based disc bulge and small superimposed left paracentral disc protrusion at L4-L5 effaces the left lateral recess, posteriorly displacing the descending L5 nerve.    3. Moderate left and mild right foraminal stenosis at L4-L5 and mild multilevel foraminal stenosis is detailed above.     XRAY  FINDINGS:  Two views were obtained. No fracture or acute-appearing malalignment. Mild to moderate degenerative disc height narrowing with associated endplate sclerosis at L5-S1, minimal degenerative disc narrowing elsewhere. Mild facet arthropathy at L4-5 and L5-S1. Moderate degenerative change in the SI joints. Nonobstructive bowel gas pattern.        Goals:  Pt. will demonstrate/verbalize independence in self-management of condition in : 6 weeks  Pt. will be independent with home exercise program in : 6 weeks    Pt will: improve VANIA by 15 points or more to show significant improvement in low back pain  impacting daily activities, within 10 visits.  Pt will: report being able to stand for greater than 10 minutes before needing to sit due to pain, to be able to perform house work and personal care, within 10 visits.      Patient's expectations/goals are realistic.    Barriers to Learning or Achieving Goals:  Chronicity of the problem.  Co-morbidities or other medical factors.  .  Mental illness or emotional factors.  .       Plan / Patient Instructions:        Plan of Care:   Authorization / Certification Start Date: 12/04/20  Authorization / Certification End Date: 03/04/21  Authorization / Certification Number of Visits: 12  Communication with: Referral Source  Patient Related Instruction: Nature of Condition;Self Care instruction;Treatment plan and rationale;Basis of treatment;Body mechanics;Precautions;Posture;Next steps;Expected outcome  Times per Week: 1-2  Number of Weeks: 6-12  Number of Visits: up to 12  Discharge Planning: when goals are met or patient is independent in management of condition  Therapeutic Exercise: ROM;Stretching;Strengthening  Neuromuscular Reeducation: balance/proprioception;posture;TNE;core;kinesio tape  Manual Therapy: soft tissue mobilization;myofascial release;joint mobilization;muscle energy  Modalities: TENS  Gait Training: as indicated      POC and pathology of condition were reviewed with patient.  Pt. is in agreement with the Plan of Care  A Home Exercise Program (HEP) was initiated today.  Pt. was instructed in exercises by PT and patient was given a handout with detailed instructions.    Plan for next visit: TENs unit, gentle manual, exercise if she can tolerate it. Start to get order in for TENs unit if patient is interested and beneficial.     Subjective:        Patient reports that she slipped and fell about a month ago. Then a couple days later, a coffee pot fell and hit her back. This is when the high pain started, she states she could not get up because she was in  tears. Since then it has not gotten better, and almost gotten worse. Pain wakes up on average 7 times a night and she cannot be comfortable in any position. Lying on side with pillow between knees is maybe the best. She reports she sometimes has pain down front and side of leg right worse than left but worst pain is along the low back right above the buttock.     They gave her muscle relaxer and it did not do anything, taking 500ml of ibuprofin two of them.   Bilaterally. Numbness and tingling. She also notes now shooting pain with electric shocks in her feet bilaterally. She continues to have difficulty with ambulating.  Pain severity is a 9/10. Timing is constant with variation. Quality is burning, stabbing.     Social information:   Living Situation: roommate daughter in law and son downstairs.    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 Treid to work yesturday but could not do it.         Equipment: none    Pain Ratin   Pain rating at best: 7  Pain rating at worst: 10  Pain description: sharp, numbness and tingling in legs and hands (normal for hands)    Functional limitations are described as occurring with:   Wakes up 7 times per night, standing after about 6 min, sitting for 20 min and walking for 10. Bending, lifting, stairs, walking, squatting, house work, personal care.    Patient reports nothing benefit it.        Objective:      Note: Items left blank indicates the item was not performed or not indicated at the time of the evaluation.    Patient Outcome Measures :    Modified Oswestry Low Back Pain Disablity Questionnaire  in %: 68     Scores range from 0-100%, where a score of 0% represents minimal pain and maximal function. The minimal clinically important difference is a score reduction of 12%.    Examination  1. Acute bilateral low back pain without sciatica     2. Generalized muscle weakness       Involved side: Bilateral  Posture Observation: forward lean posture in  sitting and standing, seems to be more comfortable in flexion and uncomfortable in upright, slight lean to left side off loading right and forward with hands on knees      General sitting posture is  poor.  General standing posture is poor.    Lumbar ROM:  Very limited by pain  Date: 12/04/20     *Indicate scale AROM AROM AROM   Lumbar Flexion Major limitation to top of knees, high pain     Lumbar Extension Unable to get to neutral due to pain, lacking about 15 degrees from neutral upright      Right Left Right Left Right Left   Lumbar Sidebending Refuses due to pain Refuses due to pain       Lumbar Rotation         Thoracic Flexion      Thoracic Extension      Thoracic Sidebending         Thoracic Rotation           Lower Extremity Strength:   Generalized weakness, more limited by pain at this time, but will continue to assess. 3+/5 all pain limited below mirta  Date: 12/04/20     LE strength/5 Right Left Right Left Right Left   Hip Flexion (L1-3)         Hip Extension (L5-S1)         Hip Abduction (L4-5)         Hip Adduction (L2-3)         Hip External Rotation         Hip Internal Rotation         Knee Extension (L3-4)         Knee Flexion         Ankle Dorsiflexion (L4-5)         Great Toe Extension (L5)         Ankle Plantar flexion (S1)         Abdominals        Sensation    WNL to light touch mirta LE      Palpation: very tender to palpation light pressure along low back, difficult to tolerate    Lumbar Special Tests:   Unable to assess many of below due to high pain level  Lumbar Special Tests Right Left SI Tests Right  Left   Quadrant test   SI Compression     Straight leg raise   SI Distraction     Crossover response   POSH Test     Slump - - Sacral Thrust     Sit-up test  FADIR     MARYURIR - alonso     Trunk extensor endurance test  Resisted Abduction     Prone instability test  Other:     Pubic shotgun  Other:       Repeated Motion Testing:  Preference of flexion, unsure about centralization    Passive  "Mobility - Joint Integrity:  Not tested, unable due to pain    Exercises:  Exercise #1: LTR x20  Comment #1: knee to chest x30 sec mirta  Exercise #2: cat cow modified, seated or standing x10      Treatment Today:  TREATMENT MINUTES COMMENTS   Evaluation 19 Low Complexity Eval  Patient educated on pathology  Discussed POC   Self-care/ Home management 3 Education on use of ice/heat for management of pain. Patient had not been using prior.   Manual therapy 10 Gentle MFR and STM to low back musculature, very sensitive to light touch pressure with patient noting some relief in symptoms.     Cuing throughout for breathing to help with relaxation   Neuromuscular Re-education     Therapeutic Activity     Therapeutic Exercises 14 Demo/performance of HEP, education on purpose of exercise. Handouts with written instruction provided.   Exercises:  Exercise #1: LTR x20  Comment #1: knee to chest x30 sec mirta  Exercise #2: cat cow modified, seated or standing x10       Gait training     Modality____TENS________ 13 Blue TENs Crawford unit used. Two large electrodes on low back. L1 for 10 minutes.     Education on use and purpose.    Patient reports \"most relief in pain she has had in the last month\"              Total 59    Blank areas are intentional and mean the treatment did not include these items.     PT Evaluation Code: (Please list factors)  Patient History/Comorbidities: See PMH   Examination: see above, 4+ elements   Clinical Presentation: stable  Clinical Decision Making: low    Patient History/  Comorbidities Examination  (body structures and functions, activity limitations, and/or participation restrictions) Clinical Presentation Clinical Decision Making (Complexity)   No documented Comorbidities or personal factors 1-2 Elements Stable and/or uncomplicated Low   1-2 documented comorbidities or personal factor 3 Elements Evolving clinical presentation with changing characteristics Moderate   3-4 documented comorbidities " or personal factors 4 or more Unstable and unpredictable High            Kyra Flowers, PT, DPT  12/4/2020  1:00 PM

## 2021-08-29 ENCOUNTER — HEALTH MAINTENANCE LETTER (OUTPATIENT)
Age: 61
End: 2021-08-29

## 2021-09-07 ENCOUNTER — OFFICE VISIT (OUTPATIENT)
Dept: PHYSICAL MEDICINE AND REHAB | Facility: CLINIC | Age: 61
End: 2021-09-07
Payer: MEDICARE

## 2021-09-07 VITALS
DIASTOLIC BLOOD PRESSURE: 67 MMHG | SYSTOLIC BLOOD PRESSURE: 104 MMHG | OXYGEN SATURATION: 98 % | HEART RATE: 78 BPM | TEMPERATURE: 97.8 F | RESPIRATION RATE: 16 BRPM

## 2021-09-07 DIAGNOSIS — G24.3 SPASMODIC TORTICOLLIS: Primary | ICD-10-CM

## 2021-09-07 DIAGNOSIS — G24.1 GENETIC TORSION DYSTONIA: ICD-10-CM

## 2021-09-07 PROCEDURE — 64643 CHEMODENERV 1 EXTREM 1-4 EA: CPT | Mod: GC | Performed by: PHYSICAL MEDICINE & REHABILITATION

## 2021-09-07 PROCEDURE — 95874 GUIDE NERV DESTR NEEDLE EMG: CPT | Mod: GC | Performed by: PHYSICAL MEDICINE & REHABILITATION

## 2021-09-07 PROCEDURE — 64642 CHEMODENERV 1 EXTREMITY 1-4: CPT | Mod: GC | Performed by: PHYSICAL MEDICINE & REHABILITATION

## 2021-09-07 PROCEDURE — 64616 CHEMODENERV MUSC NECK DYSTON: CPT | Mod: RT | Performed by: PHYSICAL MEDICINE & REHABILITATION

## 2021-09-07 NOTE — PROGRESS NOTES
BOTULINUM TOXIN PROCEDURE NOTE    Chief Complaint   Patient presents with     Botox     /67   Pulse 78   Temp 97.8  F (36.6  C)   Resp 16   LMP 02/15/2012   SpO2 98%     Current Outpatient Medications:      acetaminophen (TYLENOL) 500 MG tablet, Take 500-1,000 mg by mouth every 6 hours as needed for mild pain (can take 4 per day), Disp: , Rfl:      azelastine (OPTIVAR) 0.05 % SOLN, Place 1 drop into both eyes as needed , Disp: , Rfl:      EPINEPHrine (EPIPEN JR) 0.15 MG/0.3ML injection, Inject 0.15 mg into the muscle as needed for anaphylaxis, Disp: , Rfl:      ibuprofen (ADVIL/MOTRIN) 800 MG tablet, Take 800 mg by mouth, Disp: , Rfl:      ketoconazole (NIZORAL) 2 % external cream, , Disp: , Rfl:      penciclovir (DENAVIR) 1 % cream, Apply 1 g topically every 2 hours, Disp: , Rfl:      Allergies   Allergen Reactions     Ciprofloxacin Anaphylaxis and Other (See Comments)     Throat swelling       Morphine Sulfate Hives     Seafood Hives     Shellfish-Derived Products Anaphylaxis     Latex      CONTACT RASH WITH LATEX PRODUCTS IN THE HEAT OF SUMMER        PHYSICAL EXAM:  HEAD, NECK AND TRUNK PATTERN:   Continuous multidirectional tremor  Right rotation  Left side bending  Slightly decreased ROM with head turning to left  Pt reports excessive pain at right neck.    HPI:    Patient denies new medical diagnoses, illnesses, hospitalizations, emergency room visits, and injuries since the previous injection with botulinum neurotoxin.     We reviewed the recommended safety guidelines for  Botox from any vaccine injection, such as the seasonal flu vaccine, by a minimum of 10-14 days with Kendra Jimeliandenton. She acknowledged understanding.      RESPONSE TO PREVIOUS TREATMENT:  Kendra Vaca received 200 units of Botox on 6/15/2021.    Problems following the previous series of neurotoxin injections included:  No problems reported  Slight fatigue.    BENEFITS BY PATIENT REPORT:  Pain Improvement: Yes.   Percent Improvement: 90%  Duration of Benefit: 11 weeks followed by a gradual reduction in benefit.      Tremors and dystonia Improvement: Yes.  Percent Improvement: 90% Duration of Benefit: 11 weeks followed by a gradual reduction in benefit. More noticeable pain and tremors over the last week.         BOTULINUM NEUROTOXIN INJECTION PROCEDURES:    VERIFICATION OF PATIENT IDENTIFICATION AND PROCEDURE     Initials   Patient Name oca   Patient  oca   Procedure Verified by: [email or web address]     Prior to the start of the procedure and with procedural staff participation, I verbally confirmed the patient s identity using two indicators, relevant allergies, that the procedure was appropriate and matched the consent or emergent situation, and that the correct equipment/implants were available. Immediately prior to starting the procedure I conducted the Time Out with the procedural staff and re-confirmed the patient s name, procedure, and site/side. (The Joint Commission universal protocol was followed.)  Yes    Sedation (Moderate or Deep): None      Above assessments performed by:  Alva Fuentes MD  PM&R Resident    The attending provider was present for the entire procedure documented below.    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     Her baseline symptoms have been recalcitrant to oral medications and conservative therapy.  She is here today for an injection of Botox.        GOAL OF PROCEDURE:  The goal of this procedure is to increase active range of motion, improve volitional motor control, decrease pain  and enhance functional independence associated with dystonic movements.      TOTAL DOSE ADMINISTERED:  Dose  Administered:  200 units Botox    Diluent Used:  Preservative Free Normal Saline  Total Volume of Diluent Used:  2 ml  Lot # [id number] with Expiration Date: 11/2023  NDC #: Botox 100u ([address]-95)    Medication guide was offered to patient and was declined.    CONSENT:  The risks, benefits, and treatment options were discussed with Kendra Vaca and she agreed to proceed.      Written consent was obtained by oca.     EQUIPMENT USED:  Needle-37mm stimulating/recording  EMG/NCS Machine    SKIN PREPARATION:  Skin preparation was performed using an alcohol wipe.    GUIDANCE DESCRIPTION:  Electro-myographic guidance was necessary throughout the procedure to accurately identify all areas of dystonic muscles while avoiding injection of non-dystonic muscles, neighboring nerves and nearby vascular structures.       AREA/MUSCLE INJECTED:  200 UNITS BOTOX = TOTAL DOSE    1. HEAD & NECK MUSCLES: Total dose = 200 units Botox, 1:1 Dilution      Right Mid-Trapezius - 25 units of Botox at 1 site/s.   Left Mid-Trapezius - 15 units of Botox at 1 site/s.      Right lateral Trapezius - 30 units of Botox at 1 site.  Left lateral Trapezius - 10 units of Botox at 1 site.      Right Splenius Cervicis - 30 units of Botox at 1 site/s.   Left Splenius Cervicis - 15 units of Botox at 1 site/s.      Right Levator Scapulae - 25 units of Botox at 1 site/s (shoulder muscles).   Left Levator Scapulae - 10 units of Botox at 1 site/s (shoulder muscles).      Right Longissimus Capitis - 5 units of Botox at 1 site/s.       Right Inferior Obliquus Capitis - 5 units of Botox at 1 site/s.                  Right Sternocleidomastoid - 20 units of Botox at 1 site/s.               Left Sternocleidomastoid - 10 units of Botox at 1 site/s.       RESPONSE TO PROCEDURE:  Kendra Vaca tolerated the procedure well and there were no immediate complications.  She was allowed to recover for an appropriate period of time and was discharged home in  stable condition.    FOLLOW UP:  Kendra Vaca was asked to follow up by phone in 7-14 days with Gissel Holt PT  to report her response to this series of injections.  Based on the patient's previous response to this therapy, Kendra Vaca was rescheduled for the next series of injections in 12 weeks.    PLAN (Medication Changes, Therapy Orders, Work or Disability Issues, etc.): Patient will monitor her response to today's injections and report.

## 2021-09-07 NOTE — LETTER
9/7/2021       RE: Kendra Vaca  [address] [place] [address]     Dear Colleague,    Thank you for referring your patient, Kendra Vaca, to the [facility] [place] [facility]. Please see a copy of my visit note below.    BOTULINUM TOXIN PROCEDURE NOTE    Chief Complaint   Patient presents with     Botox     /67   Pulse 78   Temp 97.8  F (36.6  C)   Resp 16   LMP 02/15/2012   SpO2 98%     Current Outpatient Medications:      acetaminophen (TYLENOL) 500 MG tablet, Take 500-1,000 mg by mouth every 6 hours as needed for mild pain (can take 4 per day), Disp: , Rfl:      azelastine (OPTIVAR) 0.05 % SOLN, Place 1 drop into both eyes as needed , Disp: , Rfl:      EPINEPHrine (EPIPEN JR) 0.15 MG/0.3ML injection, Inject 0.15 mg into the muscle as needed for anaphylaxis, Disp: , Rfl:      ibuprofen (ADVIL/MOTRIN) 800 MG tablet, Take 800 mg by mouth, Disp: , Rfl:      ketoconazole (NIZORAL) 2 % external cream, , Disp: , Rfl:      penciclovir (DENAVIR) 1 % cream, Apply 1 g topically every 2 hours, Disp: , Rfl:      Allergies   Allergen Reactions     Ciprofloxacin Anaphylaxis and Other (See Comments)     Throat swelling       Morphine Sulfate Hives     Seafood Hives     Shellfish-Derived Products Anaphylaxis     Latex      CONTACT RASH WITH LATEX PRODUCTS IN THE HEAT OF SUMMER        PHYSICAL EXAM:  HEAD, NECK AND TRUNK PATTERN:   Continuous multidirectional tremor  Right rotation  Left side bending  Slightly decreased ROM with head turning to left  Pt reports excessive pain at right neck.    HPI:    Patient denies new medical diagnoses, illnesses, hospitalizations, emergency room visits, and injuries since the previous injection with botulinum neurotoxin.     We reviewed the recommended safety guidelines for  Botox from any vaccine injection, such as the seasonal flu vaccine, by a  minimum of 10-14 days with Kendra Vaca. She acknowledged understanding.      RESPONSE TO PREVIOUS TREATMENT:  Kendra Vaca received 200 units of Botox on 6/15/2021.    Problems following the previous series of neurotoxin injections included:  No problems reported  Slight fatigue.    BENEFITS BY PATIENT REPORT:  Pain Improvement: Yes.  Percent Improvement: 90%  Duration of Benefit: 11 weeks followed by a gradual reduction in benefit.      Tremors and dystonia Improvement: Yes.  Percent Improvement: 90% Duration of Benefit: 11 weeks followed by a gradual reduction in benefit. More noticeable pain and tremors over the last week.         BOTULINUM NEUROTOXIN INJECTION PROCEDURES:    VERIFICATION OF PATIENT IDENTIFICATION AND PROCEDURE     Initials   Patient Name oca   Patient  oca   Procedure Verified by: [email or web address]     Prior to the start of the procedure and with procedural staff participation, I verbally confirmed the patient s identity using two indicators, relevant allergies, that the procedure was appropriate and matched the consent or emergent situation, and that the correct equipment/implants were available. Immediately prior to starting the procedure I conducted the Time Out with the procedural staff and re-confirmed the patient s name, procedure, and site/side. (The Joint Commission universal protocol was followed.)  Yes    Sedation (Moderate or Deep): None      Above assessments performed by:  Alva Fuentes MD  PM&R Resident    The attending provider was present for the entire procedure documented below.    Virginia Rosas MD       INDICATION/S FOR PROCEDURE/S:  Kendra Vaca is a 61 year old patient with dystonia affecting the  head, neck and shoulder girdle musculature secondary to a diagnosis of spasmodic torticollis with associated  pain, tremor, spasms, loss of joint motion, loss of volitional motor control and difficulty with activities of daily living.     Her  baseline symptoms have been recalcitrant to oral medications and conservative therapy.  She is here today for an injection of Botox.        GOAL OF PROCEDURE:  The goal of this procedure is to increase active range of motion, improve volitional motor control, decrease pain  and enhance functional independence associated with dystonic movements.      TOTAL DOSE ADMINISTERED:  Dose Administered:  200 units Botox    Diluent Used:  Preservative Free Normal Saline  Total Volume of Diluent Used:  2 ml  Lot # [id number] with Expiration Date: 11/2023  NDC #: Botox 100u ([address]-96)    Medication guide was offered to patient and was declined.    CONSENT:  The risks, benefits, and treatment options were discussed with Kendra Vaca and she agreed to proceed.      Written consent was obtained by oca.     EQUIPMENT USED:  Needle-37mm stimulating/recording  EMG/NCS Machine    SKIN PREPARATION:  Skin preparation was performed using an alcohol wipe.    GUIDANCE DESCRIPTION:  Electro-myographic guidance was necessary throughout the procedure to accurately identify all areas of dystonic muscles while avoiding injection of non-dystonic muscles, neighboring nerves and nearby vascular structures.       AREA/MUSCLE INJECTED:  200 UNITS BOTOX = TOTAL DOSE    1. HEAD & NECK MUSCLES: Total dose = 200 units Botox, 1:1 Dilution      Right Mid-Trapezius - 25 units of Botox at 1 site/s.   Left Mid-Trapezius - 15 units of Botox at 1 site/s.      Right lateral Trapezius - 30 units of Botox at 1 site.  Left lateral Trapezius - 10 units of Botox at 1 site.      Right Splenius Cervicis - 30 units of Botox at 1 site/s.   Left Splenius Cervicis - 15 units of Botox at 1 site/s.      Right Levator Scapulae - 25 units of Botox at 1 site/s (shoulder muscles).   Left Levator Scapulae - 10 units of Botox at 1 site/s (shoulder muscles).      Right Longissimus Capitis - 5 units of Botox at 1 site/s.       Right Inferior Obliquus Capitis - 5 units of  Botox at 1 site/s.                  Right Sternocleidomastoid - 20 units of Botox at 1 site/s.               Left Sternocleidomastoid - 10 units of Botox at 1 site/s.       RESPONSE TO PROCEDURE:  Kendra Vaca tolerated the procedure well and there were no immediate complications.  She was allowed to recover for an appropriate period of time and was discharged home in stable condition.    FOLLOW UP:  Kendra Vaca was asked to follow up by phone in 7-14 days with Gissel Holt PT  to report her response to this series of injections.  Based on the patient's previous response to this therapy, Kendra Vaca was rescheduled for the next series of injections in 12 weeks.    PLAN (Medication Changes, Therapy Orders, Work or Disability Issues, etc.): Patient will monitor her response to today's injections and report.       Again, thank you for allowing me to participate in the care of your patient.      Sincerely,    Virginia Rosas MD

## 2021-09-08 NOTE — PROGRESS NOTES
BOTULINUM TOXIN PROCEDURE NOTE    Chief Complaint   Patient presents with     Botox     /67   Pulse 78   Temp 97.8  F (36.6  C)   Resp 16   LMP 02/15/2012   SpO2 98%     Current Outpatient Medications:      acetaminophen (TYLENOL) 500 MG tablet, Take 500-1,000 mg by mouth every 6 hours as needed for mild pain (can take 4 per day), Disp: , Rfl:      azelastine (OPTIVAR) 0.05 % SOLN, Place 1 drop into both eyes as needed , Disp: , Rfl:      EPINEPHrine (EPIPEN JR) 0.15 MG/0.3ML injection, Inject 0.15 mg into the muscle as needed for anaphylaxis, Disp: , Rfl:      ibuprofen (ADVIL/MOTRIN) 800 MG tablet, Take 800 mg by mouth, Disp: , Rfl:      ketoconazole (NIZORAL) 2 % external cream, , Disp: , Rfl:      penciclovir (DENAVIR) 1 % cream, Apply 1 g topically every 2 hours, Disp: , Rfl:      Allergies   Allergen Reactions     Ciprofloxacin Anaphylaxis and Other (See Comments)     Throat swelling       Morphine Sulfate Hives     Seafood Hives     Shellfish-Derived Products Anaphylaxis     Latex      CONTACT RASH WITH LATEX PRODUCTS IN THE HEAT OF SUMMER        PHYSICAL EXAM:  HEAD, NECK AND TRUNK PATTERN:   Continuous multidirectional tremor  Right rotation  Left side bending  Slightly decreased ROM with head turning to left  Pt reports excessive pain at right neck.    HPI:    Patient denies new medical diagnoses, illnesses, hospitalizations, emergency room visits, and injuries since the previous injection with botulinum neurotoxin.     We reviewed the recommended safety guidelines for  Botox from any vaccine injection, such as the seasonal flu vaccine, by a minimum of 10-14 days with Kendra Jimeliandenton. She acknowledged understanding.      RESPONSE TO PREVIOUS TREATMENT:  Kendra Vaca received 200 units of Botox on 6/15/2021.    Problems following the previous series of neurotoxin injections included:  No problems reported  Slight fatigue.    BENEFITS BY PATIENT REPORT:  Pain Improvement: Yes.   Percent Improvement: 90%  Duration of Benefit: 11 weeks followed by a gradual reduction in benefit.      Tremors and dystonia Improvement: Yes.  Percent Improvement: 90% Duration of Benefit: 11 weeks followed by a gradual reduction in benefit. More noticeable pain and tremors over the last week.         BOTULINUM NEUROTOXIN INJECTION PROCEDURES:    VERIFICATION OF PATIENT IDENTIFICATION AND PROCEDURE     Initials   Patient Name oca   Patient  oca   Procedure Verified by: oca     Prior to the start of the procedure and with procedural staff participation, I verbally confirmed the patient s identity using two indicators, relevant allergies, that the procedure was appropriate and matched the consent or emergent situation, and that the correct equipment/implants were available. Immediately prior to starting the procedure I conducted the Time Out with the procedural staff and re-confirmed the patient s name, procedure, and site/side. (The Joint Commission universal protocol was followed.)  Yes    Sedation (Moderate or Deep): None      Above assessments performed by:  Alva Fuentes MD  PM&R Resident    The attending provider was present for the entire procedure documented below.    Virginia Rosas MD       INDICATION/S FOR PROCEDURE/S:  Kendra Vaca is a 61 year old patient with dystonia affecting the  head, neck and shoulder girdle musculature secondary to a diagnosis of spasmodic torticollis with associated  pain, tremor, spasms, loss of joint motion, loss of volitional motor control and difficulty with activities of daily living.     Her baseline symptoms have been recalcitrant to oral medications and conservative therapy.  She is here today for an injection of Botox.        GOAL OF PROCEDURE:  The goal of this procedure is to increase active range of motion, improve volitional motor control, decrease pain  and enhance functional independence associated with dystonic movements.      TOTAL  DOSE ADMINISTERED:  Dose Administered:  200 units Botox    Diluent Used:  Preservative Free Normal Saline  Total Volume of Diluent Used:  2 ml  Lot # [id number] with Expiration Date: 11/2023  NDC #: Botox 100u ([address]-77)    Medication guide was offered to patient and was declined.    CONSENT:  The risks, benefits, and treatment options were discussed with Kendra Vaca and she agreed to proceed.      Written consent was obtained by oca.     EQUIPMENT USED:  Needle-37mm stimulating/recording  EMG/NCS Machine    SKIN PREPARATION:  Skin preparation was performed using an alcohol wipe.    GUIDANCE DESCRIPTION:  Electro-myographic guidance was necessary throughout the procedure to accurately identify all areas of dystonic muscles while avoiding injection of non-dystonic muscles, neighboring nerves and nearby vascular structures.       AREA/MUSCLE INJECTED:  200 UNITS BOTOX = TOTAL DOSE    1. HEAD & NECK MUSCLES: Total dose = 200 units Botox, 1:1 Dilution      Right Mid-Trapezius - 25 units of Botox at 1 site/s.   Left Mid-Trapezius - 15 units of Botox at 1 site/s.      Right lateral Trapezius - 30 units of Botox at 1 site.  Left lateral Trapezius - 10 units of Botox at 1 site.      Right Splenius Cervicis - 30 units of Botox at 1 site/s.   Left Splenius Cervicis - 15 units of Botox at 1 site/s.      Right Levator Scapulae - 25 units of Botox at 1 site/s (shoulder muscles).   Left Levator Scapulae - 10 units of Botox at 1 site/s (shoulder muscles).      Right Longissimus Capitis - 5 units of Botox at 1 site/s.       Right Inferior Obliquus Capitis - 5 units of Botox at 1 site/s.                  Right Sternocleidomastoid - 20 units of Botox at 1 site/s.               Left Sternocleidomastoid - 10 units of Botox at 1 site/s.       RESPONSE TO PROCEDURE:  Kendra Vaca tolerated the procedure well and there were no immediate complications.  She was allowed to recover for an appropriate period of time and was  discharged home in stable condition.    FOLLOW UP:  Kendra Vaca was asked to follow up by phone in 7-14 days with Gissel Holt PT  to report her response to this series of injections.  Based on the patient's previous response to this therapy, Kendra Vaca was rescheduled for the next series of injections in 12 weeks.    PLAN (Medication Changes, Therapy Orders, Work or Disability Issues, etc.): Patient will monitor her response to today's injections and report.

## 2021-10-24 ENCOUNTER — HEALTH MAINTENANCE LETTER (OUTPATIENT)
Age: 61
End: 2021-10-24

## 2021-11-30 ENCOUNTER — OFFICE VISIT (OUTPATIENT)
Dept: PHYSICAL MEDICINE AND REHAB | Facility: CLINIC | Age: 61
End: 2021-11-30
Payer: MEDICARE

## 2021-11-30 VITALS
DIASTOLIC BLOOD PRESSURE: 73 MMHG | SYSTOLIC BLOOD PRESSURE: 112 MMHG | HEART RATE: 70 BPM | OXYGEN SATURATION: 99 % | TEMPERATURE: 97.8 F | RESPIRATION RATE: 16 BRPM

## 2021-11-30 DIAGNOSIS — G24.1 GENETIC TORSION DYSTONIA: ICD-10-CM

## 2021-11-30 DIAGNOSIS — G24.3 CERVICAL DYSTONIA: Primary | ICD-10-CM

## 2021-11-30 PROCEDURE — 64642 CHEMODENERV 1 EXTREMITY 1-4: CPT | Mod: 59 | Performed by: PHYSICAL MEDICINE & REHABILITATION

## 2021-11-30 PROCEDURE — 64616 CHEMODENERV MUSC NECK DYSTON: CPT | Mod: 59 | Performed by: PHYSICAL MEDICINE & REHABILITATION

## 2021-11-30 PROCEDURE — 64643 CHEMODENERV 1 EXTREM 1-4 EA: CPT | Mod: 59 | Performed by: PHYSICAL MEDICINE & REHABILITATION

## 2021-11-30 PROCEDURE — 95874 GUIDE NERV DESTR NEEDLE EMG: CPT | Performed by: PHYSICAL MEDICINE & REHABILITATION

## 2021-11-30 PROCEDURE — 96372 THER/PROPH/DIAG INJ SC/IM: CPT | Performed by: PHYSICAL MEDICINE & REHABILITATION

## 2021-11-30 NOTE — PROGRESS NOTES
BOTULINUM TOXIN PROCEDURE NOTE    Chief Complaint   Patient presents with     Dystonia     Botox     ump return botox cervical dystonia        Current Outpatient Medications:      acetaminophen (TYLENOL) 500 MG tablet, Take 500-1,000 mg by mouth every 6 hours as needed for mild pain (can take 4 per day), Disp: , Rfl:      azelastine (OPTIVAR) 0.05 % SOLN, Place 1 drop into both eyes as needed , Disp: , Rfl:      EPINEPHrine (EPIPEN JR) 0.15 MG/0.3ML injection, Inject 0.15 mg into the muscle as needed for anaphylaxis, Disp: , Rfl:      ibuprofen (ADVIL/MOTRIN) 800 MG tablet, Take 800 mg by mouth, Disp: , Rfl:      ketoconazole (NIZORAL) 2 % external cream, , Disp: , Rfl:      penciclovir (DENAVIR) 1 % cream, Apply 1 g topically every 2 hours, Disp: , Rfl:      Allergies   Allergen Reactions     Ciprofloxacin Anaphylaxis and Other (See Comments)     Throat swelling       Morphine Sulfate Hives     Seafood Hives     Shellfish-Derived Products Anaphylaxis     Latex      CONTACT RASH WITH LATEX PRODUCTS IN THE HEAT OF SUMMER        PHYSICAL EXAM:    VS: /73   Pulse 70   Temp 97.8  F (36.6  C)   Resp 16   LMP 02/15/2012   SpO2 99%    HEAD, NECK AND TRUNK PATTERN:   Continuous multidirectional tremor  Right rotation  Left side bending  Slightly decreased ROM with head turning to left  Pt reports excessive pain at right neck.    HPI:    Patient denies new medical diagnoses, illnesses, hospitalizations, emergency room visits, and injuries since the previous injection with botulinum neurotoxin.     We reviewed the recommended safety guidelines for  Botox from any vaccine injection, such as the seasonal flu vaccine, by a minimum of 10-14 days with Kendra MARLEY Nola. She acknowledged understanding.      RESPONSE TO PREVIOUS TREATMENT:  Kendra Vaca received 200 units of Botox on 9/7/2021.    Problems following the previous series of neurotoxin injections included:  No problems reported  Slight  fatigue.    BENEFITS BY PATIENT REPORT:  Pain Improvement: Yes.  Percent Improvement: 90%  Duration of Benefit: 11 weeks followed by a gradual reduction in benefit.      Tremors and dystonia Improvement: Yes.  Percent Improvement: 90% Duration of Benefit: 11 weeks followed by a gradual reduction in benefit. More noticeable pain and tremors over the last week. She typically does not notice the return of her tremor, but other people do and often mention it to her.       BOTULINUM NEUROTOXIN INJECTION PROCEDURES:    VERIFICATION OF PATIENT IDENTIFICATION AND PROCEDURE     Initials   Patient Name ses   Patient  ses   Procedure Verified by: ses     Prior to the start of the procedure and with procedural staff participation, I verbally confirmed the patient s identity using two indicators, relevant allergies, that the procedure was appropriate and matched the consent or emergent situation, and that the correct equipment/implants were available. Immediately prior to starting the procedure I conducted the Time Out with the procedural staff and re-confirmed the patient s name, procedure, and site/side. (The Joint Commission universal protocol was followed.)  Yes    Sedation (Moderate or Deep): None      Above assessments performed by:    Virginia Rosas MD       INDICATION/S FOR PROCEDURE/S:  Kendra Vaca is a 61 year old patient with dystonia affecting the  head, neck and shoulder girdle musculature secondary to a diagnosis of spasmodic torticollis with associated  pain, tremor, spasms, loss of joint motion, loss of volitional motor control and difficulty with activities of daily living.     Her baseline symptoms have been recalcitrant to oral medications and conservative therapy.  She is here today for an injection of Botox.        GOAL OF PROCEDURE:  The goal of this procedure is to increase active range of motion, improve volitional motor control, decrease pain  and enhance functional independence  associated with dystonic movements.      TOTAL DOSE ADMINISTERED:  Dose Administered:  200 units Botox    Diluent Used:  Preservative Free Normal Saline  Total Volume of Diluent Used:  2 ml  Lot # [id number] with Expiration Date: 02/2024  NDC #: Botox 100u ([address]-02)    Medication guide was offered to patient and was declined.    CONSENT:  The risks, benefits, and treatment options were discussed with Kendra Vaca and she agreed to proceed.      Written consent was obtained by oca.     EQUIPMENT USED:  Needle-37mm stimulating/recording  EMG/NCS Machine    SKIN PREPARATION:  Skin preparation was performed using an alcohol wipe.    GUIDANCE DESCRIPTION:  Electro-myographic guidance was necessary throughout the procedure to accurately identify all areas of dystonic muscles while avoiding injection of non-dystonic muscles, neighboring nerves and nearby vascular structures.       AREA/MUSCLE INJECTED:  200 UNITS BOTOX = TOTAL DOSE    1. HEAD & NECK MUSCLES: Total dose = 200 units Botox, 1:1 Dilution      Right Mid-Trapezius - 25 units of Botox at 1 site/s.   Left Mid-Trapezius - 15 units of Botox at 1 site/s.      Right lateral Trapezius - 30 units of Botox at 1 site.  Left lateral Trapezius - 10 units of Botox at 1 site.      Right Splenius Cervicis - 30 units of Botox at 1 site/s.   Left Splenius Cervicis - 15 units of Botox at 1 site/s.      Right Levator Scapulae - 25 units of Botox at 1 site/s (shoulder muscles).   Left Levator Scapulae - 10 units of Botox at 1 site/s (shoulder muscles).      Right Longissimus Capitis - 5 units of Botox at 1 site/s.       Right Inferior Obliquus Capitis - 5 units of Botox at 1 site/s.                  Right Sternocleidomastoid - 20 units of Botox at 1 site/s.               Left Sternocleidomastoid - 10 units of Botox at 1 site/s.       RESPONSE TO PROCEDURE:  Kendra Vaca tolerated the procedure well and there were no immediate complications. She was allowed to  recover for an appropriate period of time and was discharged home in stable condition.    FOLLOW UP:  Kendra Vaca was asked to follow up by phone in 7-14 days with Gissel Holt PT  to report her response to this series of injections.  Based on the patient's previous response to this therapy, Kendra Vaca was rescheduled for the next series of injections in 12 weeks.    PLAN (Medication Changes, Therapy Orders, Work or Disability Issues, etc.): Patient will monitor her response to today's injections and report.

## 2021-11-30 NOTE — LETTER
11/30/2021       RE: Kendra Vaca  [address] [place] [address]     Dear Colleague,    Thank you for referring your patient, Kendra Vaca, to the [facility] [place] at [facility]. Please see a copy of my visit note below.    BOTULINUM TOXIN PROCEDURE NOTE    Chief Complaint   Patient presents with     Dystonia     Botox     ump return botox cervical dystonia        Current Outpatient Medications:      acetaminophen (TYLENOL) 500 MG tablet, Take 500-1,000 mg by mouth every 6 hours as needed for mild pain (can take 4 per day), Disp: , Rfl:      azelastine (OPTIVAR) 0.05 % SOLN, Place 1 drop into both eyes as needed , Disp: , Rfl:      EPINEPHrine (EPIPEN JR) 0.15 MG/0.3ML injection, Inject 0.15 mg into the muscle as needed for anaphylaxis, Disp: , Rfl:      ibuprofen (ADVIL/MOTRIN) 800 MG tablet, Take 800 mg by mouth, Disp: , Rfl:      ketoconazole (NIZORAL) 2 % external cream, , Disp: , Rfl:      penciclovir (DENAVIR) 1 % cream, Apply 1 g topically every 2 hours, Disp: , Rfl:      Allergies   Allergen Reactions     Ciprofloxacin Anaphylaxis and Other (See Comments)     Throat swelling       Morphine Sulfate Hives     Seafood Hives     Shellfish-Derived Products Anaphylaxis     Latex      CONTACT RASH WITH LATEX PRODUCTS IN THE HEAT OF SUMMER        PHYSICAL EXAM:    VS: /73   Pulse 70   Temp 97.8  F (36.6  C)   Resp 16   LMP 02/15/2012   SpO2 99%    HEAD, NECK AND TRUNK PATTERN:   Continuous multidirectional tremor  Right rotation  Left side bending  Slightly decreased ROM with head turning to left  Pt reports excessive pain at right neck.    HPI:    Patient denies new medical diagnoses, illnesses, hospitalizations, emergency room visits, and injuries since the previous injection with botulinum neurotoxin.     We reviewed the recommended safety guidelines for  Botox from any  vaccine injection, such as the seasonal flu vaccine, by a minimum of 10-14 days with Kendra Vaca. She acknowledged understanding.      RESPONSE TO PREVIOUS TREATMENT:  Kendra Vaca received 200 units of Botox on 2021.    Problems following the previous series of neurotoxin injections included:  No problems reported  Slight fatigue.    BENEFITS BY PATIENT REPORT:  Pain Improvement: Yes.  Percent Improvement: 90%  Duration of Benefit: 11 weeks followed by a gradual reduction in benefit.      Tremors and dystonia Improvement: Yes.  Percent Improvement: 90% Duration of Benefit: 11 weeks followed by a gradual reduction in benefit. More noticeable pain and tremors over the last week. She typically does not notice the return of her tremor, but other people do and often mention it to her.       BOTULINUM NEUROTOXIN INJECTION PROCEDURES:    VERIFICATION OF PATIENT IDENTIFICATION AND PROCEDURE     Initials   Patient Name ses   Patient  ses   Procedure Verified by: ses     Prior to the start of the procedure and with procedural staff participation, I verbally confirmed the patient s identity using two indicators, relevant allergies, that the procedure was appropriate and matched the consent or emergent situation, and that the correct equipment/implants were available. Immediately prior to starting the procedure I conducted the Time Out with the procedural staff and re-confirmed the patient s name, procedure, and site/side. (The Joint Commission universal protocol was followed.)  Yes    Sedation (Moderate or Deep): None      Above assessments performed by:    Virginia Rosas MD       INDICATION/S FOR PROCEDURE/S:  Kendra Vaca is a 61 year old patient with dystonia affecting the  head, neck and shoulder girdle musculature secondary to a diagnosis of spasmodic torticollis with associated  pain, tremor, spasms, loss of joint motion, loss of volitional motor control and difficulty with activities  of daily living.     Her baseline symptoms have been recalcitrant to oral medications and conservative therapy.  She is here today for an injection of Botox.        GOAL OF PROCEDURE:  The goal of this procedure is to increase active range of motion, improve volitional motor control, decrease pain  and enhance functional independence associated with dystonic movements.      TOTAL DOSE ADMINISTERED:  Dose Administered:  200 units Botox    Diluent Used:  Preservative Free Normal Saline  Total Volume of Diluent Used:  2 ml  Lot # [id number] with Expiration Date: 02/2024  NDC #: Botox 100u ([address]-52)    Medication guide was offered to patient and was declined.    CONSENT:  The risks, benefits, and treatment options were discussed with Kendra Vaca and she agreed to proceed.      Written consent was obtained by [facility].     EQUIPMENT USED:  Needle-37mm stimulating/recording  EMG/NCS Machine    SKIN PREPARATION:  Skin preparation was performed using an alcohol wipe.    GUIDANCE DESCRIPTION:  Electro-myographic guidance was necessary throughout the procedure to accurately identify all areas of dystonic muscles while avoiding injection of non-dystonic muscles, neighboring nerves and nearby vascular structures.       AREA/MUSCLE INJECTED:  200 UNITS BOTOX = TOTAL DOSE    1. HEAD & NECK MUSCLES: Total dose = 200 units Botox, 1:1 Dilution      Right Mid-Trapezius - 25 units of Botox at 1 site/s.   Left Mid-Trapezius - 15 units of Botox at 1 site/s.      Right lateral Trapezius - 30 units of Botox at 1 site.  Left lateral Trapezius - 10 units of Botox at 1 site.      Right Splenius Cervicis - 30 units of Botox at 1 site/s.   Left Splenius Cervicis - 15 units of Botox at 1 site/s.      Right Levator Scapulae - 25 units of Botox at 1 site/s (shoulder muscles).   Left Levator Scapulae - 10 units of Botox at 1 site/s (shoulder muscles).      Right Longissimus Capitis - 5 units of Botox at 1 site/s.       Right Inferior  Obliquus Capitis - 5 units of Botox at 1 site/s.                  Right Sternocleidomastoid - 20 units of Botox at 1 site/s.               Left Sternocleidomastoid - 10 units of Botox at 1 site/s.       RESPONSE TO PROCEDURE:  Kendra Vaca tolerated the procedure well and there were no immediate complications. She was allowed to recover for an appropriate period of time and was discharged home in stable condition.    FOLLOW UP:  Kendra Vaca was asked to follow up by phone in 7-14 days with Gissel Holt PT  to report her response to this series of injections.  Based on the patient's previous response to this therapy, Kendra Vaca was rescheduled for the next series of injections in 12 weeks.    PLAN (Medication Changes, Therapy Orders, Work or Disability Issues, etc.): Patient will monitor her response to today's injections and report.           Again, thank you for allowing me to participate in the care of your patient.      Sincerely,    Virginia Rosas MD

## 2021-12-02 DIAGNOSIS — G24.1 GENETIC TORSION DYSTONIA: ICD-10-CM

## 2021-12-02 DIAGNOSIS — G24.3 CERVICAL DYSTONIA: Primary | ICD-10-CM

## 2021-12-03 DIAGNOSIS — G24.1 GENETIC TORSION DYSTONIA: ICD-10-CM

## 2021-12-03 DIAGNOSIS — G24.3 CERVICAL DYSTONIA: Primary | ICD-10-CM

## 2021-12-03 DIAGNOSIS — G24.3 SPASMODIC TORTICOLLIS: Primary | ICD-10-CM

## 2022-02-24 ENCOUNTER — OFFICE VISIT (OUTPATIENT)
Dept: NEUROLOGY | Facility: CLINIC | Age: 62
End: 2022-02-24
Payer: MEDICARE

## 2022-02-24 DIAGNOSIS — G24.3 CERVICAL DYSTONIA: Primary | ICD-10-CM

## 2022-02-24 DIAGNOSIS — G24.1 GENETIC TORSION DYSTONIA: ICD-10-CM

## 2022-02-24 PROCEDURE — 95874 GUIDE NERV DESTR NEEDLE EMG: CPT | Performed by: PHYSICAL MEDICINE & REHABILITATION

## 2022-02-24 PROCEDURE — 64643 CHEMODENERV 1 EXTREM 1-4 EA: CPT | Mod: 59 | Performed by: PHYSICAL MEDICINE & REHABILITATION

## 2022-02-24 PROCEDURE — 96372 THER/PROPH/DIAG INJ SC/IM: CPT | Performed by: PHYSICAL MEDICINE & REHABILITATION

## 2022-02-24 PROCEDURE — 64616 CHEMODENERV MUSC NECK DYSTON: CPT | Mod: 59 | Performed by: PHYSICAL MEDICINE & REHABILITATION

## 2022-02-24 PROCEDURE — 64642 CHEMODENERV 1 EXTREMITY 1-4: CPT | Mod: 59 | Performed by: PHYSICAL MEDICINE & REHABILITATION

## 2022-02-24 NOTE — PROGRESS NOTES
BOTULINUM TOXIN PROCEDURE NOTE    Chief Complaint   Patient presents with     Botox Migraine       Current Outpatient Medications:      acetaminophen (TYLENOL) 500 MG tablet, Take 500-1,000 mg by mouth every 6 hours as needed for mild pain (can take 4 per day), Disp: , Rfl:      EPINEPHrine (EPIPEN JR) 0.15 MG/0.3ML injection, Inject 0.15 mg into the muscle as needed for anaphylaxis, Disp: , Rfl:      ibuprofen (ADVIL/MOTRIN) 800 MG tablet, Take 800 mg by mouth, Disp: , Rfl:      ketoconazole (NIZORAL) 2 % external cream, , Disp: , Rfl:      penciclovir (DENAVIR) 1 % cream, Apply 1 g topically every 2 hours, Disp: , Rfl:      azelastine (OPTIVAR) 0.05 % SOLN, Place 1 drop into both eyes as needed , Disp: , Rfl:     Current Facility-Administered Medications:      botulinum toxin type A (BOTOX) 100 units injection 1,200 Units, 1,200 Units, Intramuscular, Q90 Days, Virginia Rosas MD     botulinum toxin type A (BOTOX) 100 units injection 300 Units, 300 Units, Intramuscular, Q90 Days, Virginia Rosas MD     Allergies   Allergen Reactions     Ciprofloxacin Anaphylaxis and Other (See Comments)     Throat swelling       Morphine Sulfate Hives     Seafood Hives     Shellfish-Derived Products Anaphylaxis     Latex      CONTACT RASH WITH LATEX PRODUCTS IN THE HEAT OF SUMMER        PHYSICAL EXAM:    VS: LMP 02/15/2012    HEAD, NECK AND TRUNK PATTERN:   Continuous multidirectional tremor  Right rotation  Left side bending  Slightly decreased ROM with head turning to left  Pt reports excessive pain at right neck.    HPI:    Patient denies new medical diagnoses, illnesses, hospitalizations, emergency room visits, and injuries since the previous injection with botulinum neurotoxin.     We reviewed the recommended safety guidelines for  Botox from any vaccine injection, such as the seasonal flu vaccine, by a minimum of 10-14 days with Kendra Vaca. She acknowledged understanding.      RESPONSE TO  PREVIOUS TREATMENT:  Kendra Vaca received 200 units of Botox on 2021.     Problems following the previous series of neurotoxin injections included:  No problems reported  Slight fatigue.    BENEFITS BY PATIENT REPORT:  Pain Improvement: Yes.  Percent Improvement: 90%  Duration of Benefit: 11 weeks followed by a gradual reduction in benefit.      Tremors and dystonia Improvement: Yes.  Percent Improvement: 90% Duration of Benefit: 11 weeks followed by a gradual reduction in benefit. More noticeable pain and tremors over the last week. She typically does not notice the return of her tremor, but other people do and often mention it to her.       BOTULINUM NEUROTOXIN INJECTION PROCEDURES:    VERIFICATION OF PATIENT IDENTIFICATION AND PROCEDURE     Initials   Patient Name ses   Patient  ses   Procedure Verified by: ses     Prior to the start of the procedure and with procedural staff participation, I verbally confirmed the patient s identity using two indicators, relevant allergies, that the procedure was appropriate and matched the consent or emergent situation, and that the correct equipment/implants were available. Immediately prior to starting the procedure I conducted the Time Out with the procedural staff and re-confirmed the patient s name, procedure, and site/side. (The Joint Commission universal protocol was followed.)  Yes    Sedation (Moderate or Deep): None      Above assessments performed by:    Virginia Rosas MD       INDICATION/S FOR PROCEDURE/S:  Kendra Vaca is a 61 year old patient with dystonia affecting the  head, neck and shoulder girdle musculature secondary to a diagnosis of spasmodic torticollis with associated  pain, tremor, spasms, loss of joint motion, loss of volitional motor control and difficulty with activities of daily living.     Her baseline symptoms have been recalcitrant to oral medications and conservative therapy.  She is here today for an injection of  Botox.        GOAL OF PROCEDURE:  The goal of this procedure is to increase active range of motion, improve volitional motor control, decrease pain  and enhance functional independence associated with dystonic movements.      TOTAL DOSE ADMINISTERED:  Dose Administered:  200 units Botox    Diluent Used:  Preservative Free Normal Saline  Total Volume of Diluent Used:  2 ml  Lot # [id number] with Expiration Date: 05/2024  NDC #: Botox 100u ([address]-37)    Medication guide was offered to patient and was declined.    CONSENT:  The risks, benefits, and treatment options were discussed with Kendra Vaca and she agreed to proceed.      Written consent was obtained by [facility].     EQUIPMENT USED:  Needle-37mm stimulating/recording  EMG/NCS Machine    SKIN PREPARATION:  Skin preparation was performed using an alcohol wipe.    GUIDANCE DESCRIPTION:  Electro-myographic guidance was necessary throughout the procedure to accurately identify all areas of dystonic muscles while avoiding injection of non-dystonic muscles, neighboring nerves and nearby vascular structures.       AREA/MUSCLE INJECTED:  200 UNITS BOTOX = TOTAL DOSE    1. HEAD & NECK MUSCLES: Total dose = 200 units Botox, 1:1 Dilution      Right Mid-Trapezius - 25 units of Botox at 1 site/s.   Left Mid-Trapezius - 15 units of Botox at 1 site/s.      Right lateral Trapezius - 30 units of Botox at 1 site.  Left lateral Trapezius - 10 units of Botox at 1 site.      Right Splenius Cervicis - 30 units of Botox at 1 site/s.   Left Splenius Cervicis - 20 units of Botox at 1 site/s.      Right Levator Scapulae - 20 units of Botox at 1 site/s (shoulder muscles).   Left Levator Scapulae - 20 units of Botox at 1 site/s (shoulder muscles).                  Right Sternocleidomastoid - 20 units of Botox at 1 site/s.               Left Sternocleidomastoid - 10 units of Botox at 1 site/s.       RESPONSE TO PROCEDURE:  Kendra Vaca tolerated the procedure well and there were  no immediate complications. She was allowed to recover for an appropriate period of time and was discharged home in stable condition.    FOLLOW UP: Kendra Vaca was asked to follow up by phone in 7-14 days with Gissel Holt PT  to report her response to this series of injections.  Based on the patient's previous response to this therapy, Kendra Vaca was rescheduled for the next series of injections in 12 weeks.    PLAN (Medication Changes, Therapy Orders, Work or Disability Issues, etc.): Fewer injections were given today as to help with pain and anxiety associated with these injections. Patient will monitor her response to today's injections and report.

## 2022-02-24 NOTE — LETTER
2/24/2022         RE: Kendra Vaca  [address] [place] [address]        Dear Colleague,    Thank you for referring your patient, Kendra Vaca, to the [facility]. Please see a copy of my visit note below.    BOTULINUM TOXIN PROCEDURE NOTE    Chief Complaint   Patient presents with     Botox Migraine       Current Outpatient Medications:      acetaminophen (TYLENOL) 500 MG tablet, Take 500-1,000 mg by mouth every 6 hours as needed for mild pain (can take 4 per day), Disp: , Rfl:      EPINEPHrine (EPIPEN JR) 0.15 MG/0.3ML injection, Inject 0.15 mg into the muscle as needed for anaphylaxis, Disp: , Rfl:      ibuprofen (ADVIL/MOTRIN) 800 MG tablet, Take 800 mg by mouth, Disp: , Rfl:      ketoconazole (NIZORAL) 2 % external cream, , Disp: , Rfl:      penciclovir (DENAVIR) 1 % cream, Apply 1 g topically every 2 hours, Disp: , Rfl:      azelastine (OPTIVAR) 0.05 % SOLN, Place 1 drop into both eyes as needed , Disp: , Rfl:     Current Facility-Administered Medications:      botulinum toxin type A (BOTOX) 100 units injection 1,200 Units, 1,200 Units, Intramuscular, Q90 Days, Virginia Rosas MD     botulinum toxin type A (BOTOX) 100 units injection 300 Units, 300 Units, Intramuscular, Q90 Days, Virginia Rosas MD     Allergies   Allergen Reactions     Ciprofloxacin Anaphylaxis and Other (See Comments)     Throat swelling       Morphine Sulfate Hives     Seafood Hives     Shellfish-Derived Products Anaphylaxis     Latex      CONTACT RASH WITH LATEX PRODUCTS IN THE HEAT OF SUMMER        PHYSICAL EXAM:    VS: LMP 02/15/2012    HEAD, NECK AND TRUNK PATTERN:   Continuous multidirectional tremor  Right rotation  Left side bending  Slightly decreased ROM with head turning to left  Pt reports excessive pain at right neck.    HPI:    Patient denies new medical diagnoses, illnesses, hospitalizations, emergency room visits, and injuries since the previous  injection with botulinum neurotoxin.     We reviewed the recommended safety guidelines for  Botox from any vaccine injection, such as the seasonal flu vaccine, by a minimum of 10-14 days with Kendra Vaca. She acknowledged understanding.      RESPONSE TO PREVIOUS TREATMENT:  Kendra Vaca received 200 units of Botox on 2021.     Problems following the previous series of neurotoxin injections included:  No problems reported  Slight fatigue.    BENEFITS BY PATIENT REPORT:  Pain Improvement: Yes.  Percent Improvement: 90%  Duration of Benefit: 11 weeks followed by a gradual reduction in benefit.      Tremors and dystonia Improvement: Yes.  Percent Improvement: 90% Duration of Benefit: 11 weeks followed by a gradual reduction in benefit. More noticeable pain and tremors over the last week. She typically does not notice the return of her tremor, but other people do and often mention it to her.       BOTULINUM NEUROTOXIN INJECTION PROCEDURES:    VERIFICATION OF PATIENT IDENTIFICATION AND PROCEDURE     Initials   Patient Name ses   Patient  ses   Procedure Verified by: ses     Prior to the start of the procedure and with procedural staff participation, I verbally confirmed the patient s identity using two indicators, relevant allergies, that the procedure was appropriate and matched the consent or emergent situation, and that the correct equipment/implants were available. Immediately prior to starting the procedure I conducted the Time Out with the procedural staff and re-confirmed the patient s name, procedure, and site/side. (The Joint Commission universal protocol was followed.)  Yes    Sedation (Moderate or Deep): None      Above assessments performed by:    Virginia Rosas MD       INDICATION/S FOR PROCEDURE/S:  Kendra Vaca is a 61 year old patient with dystonia affecting the  head, neck and shoulder girdle musculature secondary to a diagnosis of spasmodic torticollis with  associated  pain, tremor, spasms, loss of joint motion, loss of volitional motor control and difficulty with activities of daily living.     Her baseline symptoms have been recalcitrant to oral medications and conservative therapy.  She is here today for an injection of Botox.        GOAL OF PROCEDURE:  The goal of this procedure is to increase active range of motion, improve volitional motor control, decrease pain  and enhance functional independence associated with dystonic movements.      TOTAL DOSE ADMINISTERED:  Dose Administered:  200 units Botox    Diluent Used:  Preservative Free Normal Saline  Total Volume of Diluent Used:  2 ml  Lot # [id number] with Expiration Date: 05/2024  NDC #: Botox 100u ([id number])    Medication guide was offered to patient and was declined.    CONSENT:  The risks, benefits, and treatment options were discussed with Kendra Vaca and she agreed to proceed.      Written consent was obtained by [facility].     EQUIPMENT USED:  Needle-37mm stimulating/recording  EMG/NCS Machine    SKIN PREPARATION:  Skin preparation was performed using an alcohol wipe.    GUIDANCE DESCRIPTION:  Electro-myographic guidance was necessary throughout the procedure to accurately identify all areas of dystonic muscles while avoiding injection of non-dystonic muscles, neighboring nerves and nearby vascular structures.       AREA/MUSCLE INJECTED:  200 UNITS BOTOX = TOTAL DOSE    1. HEAD & NECK MUSCLES: Total dose = 200 units Botox, 1:1 Dilution      Right Mid-Trapezius - 25 units of Botox at 1 site/s.   Left Mid-Trapezius - 15 units of Botox at 1 site/s.      Right lateral Trapezius - 30 units of Botox at 1 site.  Left lateral Trapezius - 10 units of Botox at 1 site.      Right Splenius Cervicis - 30 units of Botox at 1 site/s.   Left Splenius Cervicis - 20 units of Botox at 1 site/s.      Right Levator Scapulae - 20 units of Botox at 1 site/s (shoulder muscles).   Left Levator Scapulae - 20 units of Botox at  1 site/s (shoulder muscles).                  Right Sternocleidomastoid - 20 units of Botox at 1 site/s.               Left Sternocleidomastoid - 10 units of Botox at 1 site/s.       RESPONSE TO PROCEDURE:  Kendra Vaca tolerated the procedure well and there were no immediate complications. She was allowed to recover for an appropriate period of time and was discharged home in stable condition.    FOLLOW UP: Kendra Vaca was asked to follow up by phone in 7-14 days with Gissel Holt PT  to report her response to this series of injections.  Based on the patient's previous response to this therapy, Kendra Vaca was rescheduled for the next series of injections in 12 weeks.    PLAN (Medication Changes, Therapy Orders, Work or Disability Issues, etc.): Fewer injections were given today as to help with pain and anxiety associated with these injections. Patient will monitor her response to today's injections and report.         Again, thank you for allowing me to participate in the care of your patient.        Sincerely,        Virginia Rosas MD

## 2022-05-19 ENCOUNTER — OFFICE VISIT (OUTPATIENT)
Dept: PHYSICAL MEDICINE AND REHAB | Facility: CLINIC | Age: 62
End: 2022-05-19
Payer: MEDICARE

## 2022-05-19 VITALS — SYSTOLIC BLOOD PRESSURE: 107 MMHG | DIASTOLIC BLOOD PRESSURE: 67 MMHG | HEART RATE: 90 BPM

## 2022-05-19 DIAGNOSIS — G24.3 SPASMODIC TORTICOLLIS: Primary | ICD-10-CM

## 2022-05-19 DIAGNOSIS — G24.1 GENETIC TORSION DYSTONIA: ICD-10-CM

## 2022-05-19 PROCEDURE — 64616 CHEMODENERV MUSC NECK DYSTON: CPT | Mod: 59 | Performed by: PHYSICAL MEDICINE & REHABILITATION

## 2022-05-19 PROCEDURE — 95874 GUIDE NERV DESTR NEEDLE EMG: CPT | Performed by: PHYSICAL MEDICINE & REHABILITATION

## 2022-05-19 PROCEDURE — 64643 CHEMODENERV 1 EXTREM 1-4 EA: CPT | Mod: 59 | Performed by: PHYSICAL MEDICINE & REHABILITATION

## 2022-05-19 PROCEDURE — 96372 THER/PROPH/DIAG INJ SC/IM: CPT | Performed by: PHYSICAL MEDICINE & REHABILITATION

## 2022-05-19 PROCEDURE — 64642 CHEMODENERV 1 EXTREMITY 1-4: CPT | Mod: 59 | Performed by: PHYSICAL MEDICINE & REHABILITATION

## 2022-05-19 NOTE — NURSING NOTE
Chief Complaint   Patient presents with     Botox     Cervical botox       TORI Caal on 5/19/2022 at 12:56 PM

## 2022-05-19 NOTE — LETTER
"    5/19/2022         RE: Kendra Vaca  [address] [place] [address]        Dear Colleague,    Thank you for referring your patient, Kendra Vaca, to the [facility]. Please see a copy of my visit note below.      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     SINCE LAST VISIT  Kendra Vaca was last seen here in clinic on 2/24/2022, at which time she received 200 units of Botox.    Patient denies new medical diagnoses, illnesses, hospitalizations, emergency room visits, and injuries since the previous injection with botulinum neurotoxin. She has been having lower back pain necessitating more use of Aspirin.     RESPONSE TO PREVIOUS TREATMENT    Side effects: No problems reported    Pain Improvement: Yes.  Percent Improvement: 90 %    Duration of Benefit:  10 weeks and followed by a gradual reduction in benefit. She states \"I feel great until the Botox wears off.\"     Dystonia Improvement: Yes.  Percent Improvement: 90 %    Duration of Benefit:  10 weeks and followed by a gradual reduction in benefit. She has been told that her tremors have come back over the last two weeks.       PHYSICAL EXAM  VS: /67 (BP Location: Right arm, Patient Position: Sitting)   Pulse 90   LMP 02/15/2012    GEN: Pleasant and cooperative, in no acute distress  HEENT: No facial asymmetry  HEAD, NECK AND TRUNK PATTERN:   Continuous multidirectional tremor  Right rotation  Left side bending      ALLERGIES  Allergies   Allergen Reactions     Ciprofloxacin Anaphylaxis and Other (See Comments)     Throat swelling       Morphine Sulfate Hives "     Seafood Hives     Shellfish-Derived Products Anaphylaxis     Latex      CONTACT RASH WITH LATEX PRODUCTS IN THE HEAT OF SUMMER       CURRENT MEDICATIONS    Current Outpatient Medications:      acetaminophen (TYLENOL) 500 MG tablet, Take 500-1,000 mg by mouth every 6 hours as needed for mild pain (can take 4 per day), Disp: , Rfl:      azelastine (OPTIVAR) 0.05 % SOLN, Place 1 drop into both eyes as needed , Disp: , Rfl:      ketoconazole (NIZORAL) 2 % external cream, , Disp: , Rfl:      penciclovir (DENAVIR) 1 % external cream, Apply 1 g topically every 2 hours, Disp: , Rfl:      EPINEPHrine (EPIPEN JR) 0.15 MG/0.3ML injection, Inject 0.15 mg into the muscle as needed for anaphylaxis (Patient not taking: Reported on 2022), Disp: , Rfl:      ibuprofen (ADVIL/MOTRIN) 800 MG tablet, Take 800 mg by mouth (Patient not taking: Reported on 2022), Disp: , Rfl:     Current Facility-Administered Medications:      botulinum toxin type A (BOTOX) 100 units injection 1,200 Units, 1,200 Units, Intramuscular, Q90 Days, Virginia Rosas MD     botulinum toxin type A (BOTOX) 100 units injection 300 Units, 300 Units, Intramuscular, Q90 Days, Virginia Rosas MD, 200 Units at 22 0828       BOTULINUM NEUROTOXIN INJECTION PROCEDURES    VERIFICATION OF PATIENT IDENTIFICATION AND PROCEDURE     Initials   Patient Name SES   Patient  SES   Procedure Verified by: SES     Prior to the start of the procedure and with procedural staff participation, I verbally confirmed the patient s identity using two indicators, relevant allergies, that the procedure was appropriate and matched the consent or emergent situation, and that the correct equipment/implants were available. Immediately prior to starting the procedure I conducted the Time Out with the procedural staff and re-confirmed the patient s name, procedure, and site/side. 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  She is here today for reinjection with Botox.    GOAL OF PROCEDURE  The goal of this procedure is to increase active range of motion, improve volitional motor control, decrease pain  and enhance functional independence.      TOTAL DOSE ADMINISTERED  Dose Administered:  200 units  Botox (Botulinum Toxin Type A)       1:1 Dilution   Unavoidable Drug Waste: No  Diluent Used:  Preservative Free Normal Saline  Total Volume of Diluent Used:  2 ml  Lot # [id number] with Expiration Date:  05/2024  NDC #: Botox 100u ([address]-79)      CONSENT  The risks, benefits, and treatment options were discussed with Kendra Vaca and she agreed to proceed.    Written consent was obtained by [facility].     EQUIPMENT USED  Needle-37mm stimulating/recording  EMG/NCS Machine    SKIN PREPARATION  Skin preparation was performed using an alcohol wipe.    GUIDANCE DESCRIPTION  Electro-myographic guidance was necessary throughout the procedure to accurately identify all areas of dystonic muscles while avoiding injection of non-dystonic muscles, neighboring nerves and nearby vascular structures.       AREA/MUSCLE INJECTED:  200 UNITS BOTOX = TOTAL DOSE    1. HEAD & NECK MUSCLES: Total dose = 200 units Botox, 1:1 Dilution      Right Mid-Trapezius - 25 units of Botox at 1 site/s.   Left Mid-Trapezius - 15 units of Botox at 1 site/s.      Right lateral Trapezius - 30 units of Botox at 1 site.  Left lateral Trapezius - 10 units of Botox at 1 site.      Right Splenius Cervicis - 30 units of Botox at 1 site/s.   Left Splenius Cervicis - 20 units of Botox at 1 site/s.      Right Levator Scapulae - 20 units of Botox at 1 site/s  (shoulder muscles).   Left Levator Scapulae - 20 units of Botox at 1 site/s (shoulder muscles).                  Right Sternocleidomastoid - 20 units of Botox at 1 site/s.               Left Sternocleidomastoid - 10 units of Botox at 1 site/s.       RESPONSE TO PROCEDURE  Kendra Vaca tolerated the procedure well and there were no immediate complications. She was allowed to recover for an appropriate period of time and was discharged home in stable condition.    ASSESSMENT AND PLAN   1. Botulinum toxin injections: No changes made to Botox dose or distribution today. Patient will continue to monitor response and report at next appointment.   2. Referrals: None.   3. Follow up: Kendra Vaca was rescheduled for the next series of injections in 12 weeks, at which time we will evaluate response to today's injections. she may call the clinic prior with any questions or concerns prior to the next appointment.           Again, thank you for allowing me to participate in the care of your patient.        Sincerely,        Virginia Rosas MD

## 2022-05-19 NOTE — PROGRESS NOTES
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     SINCE LAST VISIT  Kendra Vaca was last seen here in clinic on 2/24/2022, at which time she received 200 units of Botox.    Patient denies new medical diagnoses, illnesses, hospitalizations, emergency room visits, and injuries since the previous injection with botulinum neurotoxin. She has been having lower back pain necessitating more use of Aspirin.     RESPONSE TO PREVIOUS TREATMENT    Side effects: No problems reported    Pain Improvement: Yes.  Percent Improvement: 90 %    Duration of Benefit:  10 weeks and followed by a gradual reduction in benefit. She states \"I feel great until the Botox wears off.\"     Dystonia Improvement: Yes.  Percent Improvement: 90 %    Duration of Benefit:  10 weeks and followed by a gradual reduction in benefit. She has been told that her tremors have come back over the last two weeks.       PHYSICAL EXAM  VS: /67 (BP Location: Right arm, Patient Position: Sitting)   Pulse 90   LMP 02/15/2012    GEN: Pleasant and cooperative, in no acute distress  HEENT: No facial asymmetry  HEAD, NECK AND TRUNK PATTERN:   Continuous multidirectional tremor  Right rotation  Left side bending      ALLERGIES  Allergies   Allergen Reactions     Ciprofloxacin Anaphylaxis and Other (See Comments)     Throat swelling       Morphine Sulfate Hives     Seafood Hives     Shellfish-Derived Products Anaphylaxis     Latex      CONTACT RASH WITH LATEX PRODUCTS IN THE HEAT OF SUMMER       CURRENT MEDICATIONS    Current Outpatient Medications:      acetaminophen (TYLENOL) 500 MG tablet, Take 500-1,000 mg by mouth every " 6 hours as needed for mild pain (can take 4 per day), Disp: , Rfl:      azelastine (OPTIVAR) 0.05 % SOLN, Place 1 drop into both eyes as needed , Disp: , Rfl:      ketoconazole (NIZORAL) 2 % external cream, , Disp: , Rfl:      penciclovir (DENAVIR) 1 % external cream, Apply 1 g topically every 2 hours, Disp: , Rfl:      EPINEPHrine (EPIPEN JR) 0.15 MG/0.3ML injection, Inject 0.15 mg into the muscle as needed for anaphylaxis (Patient not taking: Reported on 2022), Disp: , Rfl:      ibuprofen (ADVIL/MOTRIN) 800 MG tablet, Take 800 mg by mouth (Patient not taking: Reported on 2022), Disp: , Rfl:     Current Facility-Administered Medications:      botulinum toxin type A (BOTOX) 100 units injection 1,200 Units, 1,200 Units, Intramuscular, Q90 Days, Virginia Rosas MD     botulinum toxin type A (BOTOX) 100 units injection 300 Units, 300 Units, Intramuscular, Q90 Days, Virginia Rosas MD, 200 Units at 22 0828       BOTULINUM NEUROTOXIN INJECTION PROCEDURES    VERIFICATION OF PATIENT IDENTIFICATION AND PROCEDURE     Initials   Patient Name SES   Patient  SES   Procedure Verified by: JASMYN     Prior to the start of the procedure and with procedural staff participation, I verbally confirmed the patient s identity using two indicators, relevant allergies, that the procedure was appropriate and matched the consent or emergent situation, and that the correct equipment/implants were available. Immediately prior to starting the procedure I conducted the Time Out with the procedural staff and re-confirmed the patient s name, procedure, and site/side. 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  She is here today for reinjection with Botox.    GOAL OF PROCEDURE  The goal of this procedure is to increase active range of motion, improve volitional motor control, decrease pain  and enhance functional independence.      TOTAL DOSE ADMINISTERED  Dose Administered:  200 units  Botox (Botulinum Toxin Type A)       1:1 Dilution   Unavoidable Drug Waste: No  Diluent Used:  Preservative Free Normal Saline  Total Volume of Diluent Used:  2 ml  Lot # [id number] with Expiration Date:  05/2024  NDC #: Botox 100u ([address]-35)      CONSENT  The risks, benefits, and treatment options were discussed with Kendra Vaca and she agreed to proceed.    Written consent was obtained by [facility].     EQUIPMENT USED  Needle-37mm stimulating/recording  EMG/NCS Machine    SKIN PREPARATION  Skin preparation was performed using an alcohol wipe.    GUIDANCE DESCRIPTION  Electro-myographic guidance was necessary throughout the procedure to accurately identify all areas of dystonic muscles while avoiding injection of non-dystonic muscles, neighboring nerves and nearby vascular structures.       AREA/MUSCLE INJECTED:  200 UNITS BOTOX = TOTAL DOSE    1. HEAD & NECK MUSCLES: Total dose = 200 units Botox, 1:1 Dilution      Right Mid-Trapezius - 25 units of Botox at 1 site/s.   Left Mid-Trapezius - 15 units of Botox at 1 site/s.      Right lateral Trapezius - 30 units of Botox at 1 site.  Left lateral Trapezius - 10 units of Botox at 1 site.      Right Splenius Cervicis - 30 units of Botox at 1 site/s.   Left Splenius Cervicis - 20 units of Botox at 1 site/s.      Right Levator Scapulae - 20 units of Botox at 1 site/s (shoulder muscles).   Left Levator Scapulae - 20 units of Botox at 1 site/s (shoulder muscles).                  Right Sternocleidomastoid - 20 units of Botox at 1 site/s.               Left Sternocleidomastoid - 10 units of Botox at 1 site/s.       RESPONSE TO  PROCEDURE  Kendra Vaca tolerated the procedure well and there were no immediate complications. She was allowed to recover for an appropriate period of time and was discharged home in stable condition.    ASSESSMENT AND PLAN   1. Botulinum toxin injections: No changes made to Botox dose or distribution today. Patient will continue to monitor response and report at next appointment.   2. Referrals: None.   3. Follow up: Kendra Vaca was rescheduled for the next series of injections in 12 weeks, at which time we will evaluate response to today's injections. she may call the clinic prior with any questions or concerns prior to the next appointment.

## 2022-08-11 ENCOUNTER — OFFICE VISIT (OUTPATIENT)
Dept: PHYSICAL MEDICINE AND REHAB | Facility: CLINIC | Age: 62
End: 2022-08-11
Payer: MEDICARE

## 2022-08-11 DIAGNOSIS — G24.3 SPASMODIC TORTICOLLIS: Primary | ICD-10-CM

## 2022-08-11 DIAGNOSIS — G24.1 GENETIC TORSION DYSTONIA: ICD-10-CM

## 2022-08-11 PROCEDURE — 64616 CHEMODENERV MUSC NECK DYSTON: CPT | Mod: 50 | Performed by: PHYSICAL MEDICINE & REHABILITATION

## 2022-08-11 PROCEDURE — 95874 GUIDE NERV DESTR NEEDLE EMG: CPT | Performed by: PHYSICAL MEDICINE & REHABILITATION

## 2022-08-11 PROCEDURE — 96372 THER/PROPH/DIAG INJ SC/IM: CPT | Mod: 59 | Performed by: PHYSICAL MEDICINE & REHABILITATION

## 2022-08-11 PROCEDURE — 64643 CHEMODENERV 1 EXTREM 1-4 EA: CPT | Performed by: PHYSICAL MEDICINE & REHABILITATION

## 2022-08-11 PROCEDURE — 64642 CHEMODENERV 1 EXTREMITY 1-4: CPT | Performed by: PHYSICAL MEDICINE & REHABILITATION

## 2022-08-11 NOTE — PROGRESS NOTES
"  [facility]    PM&R CLINIC NOTE  BOTULINUM TOXIN PROCEDURE      HPI  No chief complaint on file.    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     SINCE LAST VISIT  Kendra Vaca was last seen here in clinic on 5/19/2022, at which time she received 200 units of Botox.    Patient denies new medical diagnoses, illnesses, hospitalizations, emergency room visits, and injuries since the previous injection with botulinum neurotoxin.     RESPONSE TO PREVIOUS TREATMENT    Side effects: No problems reported    Pain Improvement: Yes.  Percent Improvement: 90 %    Duration of Benefit:  10 weeks and followed by a gradual reduction in benefit. She states \"I feel great until the Botox wears off.\"     Dystonia Improvement: Yes.  Percent Improvement: 90 %    Duration of Benefit:  10 weeks and followed by a gradual reduction in benefit. She has been told that her tremors have come back over the last two weeks.       PHYSICAL EXAM  VS: [facility] 02/15/2012    GEN: Pleasant and cooperative, in no acute distress  HEENT: No facial asymmetry  HEAD, NECK AND TRUNK PATTERN:   Continuous multidirectional tremor  Right rotation  Left side bending      ALLERGIES  Allergies   Allergen Reactions     Ciprofloxacin Anaphylaxis and Other (See Comments)     Throat swelling       Morphine Sulfate Hives     Seafood Hives     Shellfish-Derived Products Anaphylaxis     Latex      CONTACT RASH WITH LATEX PRODUCTS IN THE HEAT OF SUMMER       CURRENT MEDICATIONS    Current Outpatient Medications:      acetaminophen (TYLENOL) 500 MG tablet, Take 500-1,000 mg by mouth every 6 hours as needed for mild pain (can take 4 per day), Disp: , Rfl:      azelastine (OPTIVAR) 0.05 % SOLN, Place 1 drop into both eyes as needed , Disp: , Rfl:      EPINEPHrine (EPIPEN JR) " 0.15 MG/0.3ML injection, Inject 0.15 mg into the muscle as needed for anaphylaxis (Patient not taking: Reported on 2022), Disp: , Rfl:      ibuprofen (ADVIL/MOTRIN) 800 MG tablet, Take 800 mg by mouth (Patient not taking: Reported on 2022), Disp: , Rfl:      ketoconazole (NIZORAL) 2 % external cream, , Disp: , Rfl:      penciclovir (DENAVIR) 1 % external cream, Apply 1 g topically every 2 hours, Disp: , Rfl:     Current Facility-Administered Medications:      botulinum toxin type A (BOTOX) 100 units injection 1,200 Units, 1,200 Units, Intramuscular, Q90 Days, Virginia Rosas MD     botulinum toxin type A (BOTOX) 100 units injection 300 Units, 300 Units, Intramuscular, Q90 Days, Virginia Rosas MD, 200 Units at 22 1734       BOTULINUM NEUROTOXIN INJECTION PROCEDURES    VERIFICATION OF PATIENT IDENTIFICATION AND PROCEDURE     Initials   Patient Name SES   Patient  SES   Procedure Verified by: SES     Prior to the start of the procedure and with procedural staff participation, I verbally confirmed the patient s identity using two indicators, relevant allergies, that the procedure was appropriate and matched the consent or emergent situation, and that the correct equipment/implants were available. Immediately prior to starting the procedure I conducted the Time Out with the procedural staff and re-confirmed the patient s name, procedure, and site/side. 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  She is here today for reinjection with Botox.    GOAL OF PROCEDURE  The goal of this procedure is to increase active range of  motion, improve volitional motor control, decrease pain  and enhance functional independence.      TOTAL DOSE ADMINISTERED  Dose Administered:  200 units  Botox (Botulinum Toxin Type A)       1:1 Dilution   Unavoidable Drug Waste: No  Diluent Used:  Preservative Free Normal Saline  Total Volume of Diluent Used:  2 ml  Lot # [id number] with Expiration Date:  06/2024  NDC #: Botox 100u ([address]-11)      CONSENT  The risks, benefits, and treatment options were discussed with Kendra Vaca and she agreed to proceed.    Written consent was obtained by [facility].     EQUIPMENT USED  Needle-37mm stimulating/recording  EMG/NCS Machine    SKIN PREPARATION  Skin preparation was performed using an alcohol wipe.    GUIDANCE DESCRIPTION  Electro-myographic guidance was necessary throughout the procedure to accurately identify all areas of dystonic muscles while avoiding injection of non-dystonic muscles, neighboring nerves and nearby vascular structures.       AREA/MUSCLE INJECTED:  200 UNITS BOTOX = TOTAL DOSE    1. HEAD & NECK MUSCLES: Total dose = 200 units Botox, 1:1 Dilution      Right Mid-Trapezius - 25 units of Botox at 1 site/s.   Left Mid-Trapezius - 15 units of Botox at 1 site/s.      Right lateral Trapezius - 30 units of Botox at 1 site.  Left lateral Trapezius - 10 units of Botox at 1 site.      Right Splenius Cervicis - 30 units of Botox at 1 site/s.   Left Splenius Cervicis - 20 units of Botox at 1 site/s.      Right Levator Scapulae - 20 units of Botox at 1 site/s (shoulder muscles).   Left Levator Scapulae - 20 units of Botox at 1 site/s (shoulder muscles).                  Right Sternocleidomastoid - 20 units of Botox at 1 site/s.               Left Sternocleidomastoid - 10 units of Botox at 1 site/s.       RESPONSE TO PROCEDURE  Kendra Vaca tolerated the procedure well and there were no immediate complications. She was allowed to recover for an appropriate period of time and was discharged home in  stable condition.    ASSESSMENT AND PLAN   1. Botulinum toxin injections: No changes made to Botox dose or distribution today. Patient will continue to monitor response and report at next appointment.   2. Referrals: None.   3. Follow up: Kendra Vaca was rescheduled for the next series of injections in 12 weeks, at which time we will evaluate response to today's injections. she may call the clinic prior with any questions or concerns prior to the next appointment.

## 2022-08-11 NOTE — LETTER
"    8/11/2022         RE: Kendra Vaca  [address] [place] [address]        Dear Colleague,    Thank you for referring your patient, Kendra Vaca, to the [facility]. Please see a copy of my visit note below.      [facility]    PM&R CLINIC NOTE  BOTULINUM TOXIN PROCEDURE      HPI  No chief complaint on file.    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     SINCE LAST VISIT  Kendra Vaca was last seen here in clinic on 5/19/2022, at which time she received 200 units of Botox.    Patient denies new medical diagnoses, illnesses, hospitalizations, emergency room visits, and injuries since the previous injection with botulinum neurotoxin.     RESPONSE TO PREVIOUS TREATMENT    Side effects: No problems reported    Pain Improvement: Yes.  Percent Improvement: 90 %    Duration of Benefit:  10 weeks and followed by a gradual reduction in benefit. She states \"I feel great until the Botox wears off.\"     Dystonia Improvement: Yes.  Percent Improvement: 90 %    Duration of Benefit:  10 weeks and followed by a gradual reduction in benefit. She has been told that her tremors have come back over the last two weeks.       PHYSICAL EXAM  VS: [facility] 02/15/2012    GEN: Pleasant and cooperative, in no acute distress  HEENT: No facial asymmetry  HEAD, NECK AND TRUNK PATTERN:   Continuous multidirectional tremor  Right rotation  Left side bending      ALLERGIES  Allergies   Allergen Reactions     Ciprofloxacin Anaphylaxis and Other (See Comments)     Throat swelling       Morphine Sulfate Hives     Seafood Hives     Shellfish-Derived Products Anaphylaxis     Latex      CONTACT RASH WITH LATEX PRODUCTS IN THE HEAT OF SUMMER       CURRENT MEDICATIONS    Current Outpatient " Medications:      acetaminophen (TYLENOL) 500 MG tablet, Take 500-1,000 mg by mouth every 6 hours as needed for mild pain (can take 4 per day), Disp: , Rfl:      azelastine (OPTIVAR) 0.05 % SOLN, Place 1 drop into both eyes as needed , Disp: , Rfl:      EPINEPHrine (EPIPEN JR) 0.15 MG/0.3ML injection, Inject 0.15 mg into the muscle as needed for anaphylaxis (Patient not taking: Reported on 2022), Disp: , Rfl:      ibuprofen (ADVIL/MOTRIN) 800 MG tablet, Take 800 mg by mouth (Patient not taking: Reported on 2022), Disp: , Rfl:      ketoconazole (NIZORAL) 2 % external cream, , Disp: , Rfl:      penciclovir (DENAVIR) 1 % external cream, Apply 1 g topically every 2 hours, Disp: , Rfl:     Current Facility-Administered Medications:      botulinum toxin type A (BOTOX) 100 units injection 1,200 Units, 1,200 Units, Intramuscular, Q90 Days, Virginia Rosas MD     botulinum toxin type A (BOTOX) 100 units injection 300 Units, 300 Units, Intramuscular, Q90 Days, Virginia Rosas MD, 200 Units at 22 1734       BOTULINUM NEUROTOXIN INJECTION PROCEDURES    VERIFICATION OF PATIENT IDENTIFICATION AND PROCEDURE     Initials   Patient Name SES   Patient  SES   Procedure Verified by: SES     Prior to the start of the procedure and with procedural staff participation, I verbally confirmed the patient s identity using two indicators, relevant allergies, that the procedure was appropriate and matched the consent or emergent situation, and that the correct equipment/implants were available. Immediately prior to starting the procedure I conducted the Time Out with the procedural staff and re-confirmed the patient s name, procedure, and site/side. (The Joint Commission universal protocol was followed.)  Yes    Sedation (Moderate or Deep): None    ABOVE ASSESSMENTS PERFORMED BY    Virginia Rosas MD      INDICATIONS FOR PROCEDURES  Kendra Vaca is a 62 year old patient with a history of  involuntary spasms of the neck and shoulder muscles with tremor who presents to clinic for botulinum toxin injections for management of cervical dystonia.  She is here today for reinjection with Botox.    GOAL OF PROCEDURE  The goal of this procedure is to increase active range of motion, improve volitional motor control, decrease pain  and enhance functional independence.      TOTAL DOSE ADMINISTERED  Dose Administered:  200 units  Botox (Botulinum Toxin Type A)       1:1 Dilution   Unavoidable Drug Waste: No  Diluent Used:  Preservative Free Normal Saline  Total Volume of Diluent Used:  2 ml  Lot # [id number] with Expiration Date:  06/2024  NDC #: Botox 100u ([address]-95)      CONSENT  The risks, benefits, and treatment options were discussed with Kendra Vaca and she agreed to proceed.    Written consent was obtained by dejon.     EQUIPMENT USED  Needle-37mm stimulating/recording  EMG/NCS Machine    SKIN PREPARATION  Skin preparation was performed using an alcohol wipe.    GUIDANCE DESCRIPTION  Electro-myographic guidance was necessary throughout the procedure to accurately identify all areas of dystonic muscles while avoiding injection of non-dystonic muscles, neighboring nerves and nearby vascular structures.       AREA/MUSCLE INJECTED:  200 UNITS BOTOX = TOTAL DOSE    1. HEAD & NECK MUSCLES: Total dose = 200 units Botox, 1:1 Dilution      Right Mid-Trapezius - 25 units of Botox at 1 site/s.   Left Mid-Trapezius - 15 units of Botox at 1 site/s.      Right lateral Trapezius - 30 units of Botox at 1 site.  Left lateral Trapezius - 10 units of Botox at 1 site.      Right Splenius Cervicis - 30 units of Botox at 1 site/s.   Left Splenius Cervicis - 20 units of Botox at 1 site/s.      Right Levator Scapulae - 20 units of Botox at 1 site/s (shoulder muscles).   Left Levator Scapulae - 20 units of Botox at 1 site/s (shoulder muscles).                  Right Sternocleidomastoid - 20 units of Botox at 1  site/s.               Left Sternocleidomastoid - 10 units of Botox at 1 site/s.       RESPONSE TO PROCEDURE  Kendra Vaca tolerated the procedure well and there were no immediate complications. She was allowed to recover for an appropriate period of time and was discharged home in stable condition.    ASSESSMENT AND PLAN   1. Botulinum toxin injections: No changes made to Botox dose or distribution today. Patient will continue to monitor response and report at next appointment.   2. Referrals: None.   3. Follow up: Kendra Vaca was rescheduled for the next series of injections in 12 weeks, at which time we will evaluate response to today's injections. she may call the clinic prior with any questions or concerns prior to the next appointment.         Again, thank you for allowing me to participate in the care of your patient.        Sincerely,        Virginia Rosas MD

## 2022-09-13 ENCOUNTER — HOSPITAL ENCOUNTER (OUTPATIENT)
Dept: GENERAL RADIOLOGY | Facility: HOSPITAL | Age: 62
Discharge: HOME | End: 2022-09-13
Attending: PHYSICIAN ASSISTANT
Payer: MEDICARE

## 2022-09-13 ENCOUNTER — TELEPHONE (OUTPATIENT)
Dept: FAMILY MEDICINE | Facility: CLINIC | Age: 62
End: 2022-09-13

## 2022-09-13 ENCOUNTER — OFFICE VISIT (OUTPATIENT)
Dept: FAMILY MEDICINE | Facility: CLINIC | Age: 62
End: 2022-09-13
Payer: MEDICARE

## 2022-09-13 VITALS
TEMPERATURE: 97.8 F | HEART RATE: 77 BPM | DIASTOLIC BLOOD PRESSURE: 79 MMHG | BODY MASS INDEX: 28.54 KG/M2 | WEIGHT: 171.5 LBS | RESPIRATION RATE: 20 BRPM | OXYGEN SATURATION: 96 % | SYSTOLIC BLOOD PRESSURE: 122 MMHG

## 2022-09-13 DIAGNOSIS — M25.471 ANKLE SWELLING, RIGHT: ICD-10-CM

## 2022-09-13 DIAGNOSIS — M25.471 ANKLE SWELLING, RIGHT: Primary | ICD-10-CM

## 2022-09-13 LAB
BASOPHILS # BLD AUTO: 0.1 10E3/UL (ref 0–0.2)
BASOPHILS NFR BLD AUTO: 1 %
C REACTIVE PROTEIN LHE: 1.1 MG/DL (ref 0–?)
EOSINOPHIL # BLD AUTO: 0.4 10E3/UL (ref 0–0.7)
EOSINOPHIL NFR BLD AUTO: 4 %
ERYTHROCYTE [DISTWIDTH] IN BLOOD BY AUTOMATED COUNT: 13 % (ref 10–15)
ERYTHROCYTE [SEDIMENTATION RATE] IN BLOOD BY WESTERGREN METHOD: 13 MM/HR (ref 0–20)
HCT VFR BLD AUTO: 42.6 % (ref 35–47)
HGB BLD-MCNC: 13 G/DL (ref 11.7–15.7)
IMM GRANULOCYTES # BLD: 0 10E3/UL
IMM GRANULOCYTES NFR BLD: 0 %
LYMPHOCYTES # BLD AUTO: 2.5 10E3/UL (ref 0.8–5.3)
LYMPHOCYTES NFR BLD AUTO: 26 %
MCH RBC QN AUTO: 28.4 PG (ref 26.5–33)
MCHC RBC AUTO-ENTMCNC: 30.5 G/DL (ref 31.5–36.5)
MCV RBC AUTO: 93 FL (ref 78–100)
MONOCYTES # BLD AUTO: 0.6 10E3/UL (ref 0–1.3)
MONOCYTES NFR BLD AUTO: 6 %
NEUTROPHILS # BLD AUTO: 5.9 10E3/UL (ref 1.6–8.3)
NEUTROPHILS NFR BLD AUTO: 63 %
PLATELET # BLD AUTO: 322 10E3/UL (ref 150–450)
RBC # BLD AUTO: 4.58 10E6/UL (ref 3.8–5.2)
URATE SERPL-MCNC: 5.2 MG/DL (ref 2–7.5)
WBC # BLD AUTO: 9.3 10E3/UL (ref 4–11)

## 2022-09-13 PROCEDURE — 99214 OFFICE O/P EST MOD 30 MIN: CPT | Performed by: PHYSICIAN ASSISTANT

## 2022-09-13 PROCEDURE — 36415 COLL VENOUS BLD VENIPUNCTURE: CPT | Performed by: PHYSICIAN ASSISTANT

## 2022-09-13 PROCEDURE — 86140 C-REACTIVE PROTEIN: CPT | Performed by: PHYSICIAN ASSISTANT

## 2022-09-13 PROCEDURE — 73630 X-RAY EXAM OF FOOT: CPT | Mod: RT

## 2022-09-13 PROCEDURE — 84550 ASSAY OF BLOOD/URIC ACID: CPT | Performed by: PHYSICIAN ASSISTANT

## 2022-09-13 PROCEDURE — 85025 COMPLETE CBC W/AUTO DIFF WBC: CPT | Performed by: PHYSICIAN ASSISTANT

## 2022-09-13 PROCEDURE — 85652 RBC SED RATE AUTOMATED: CPT | Performed by: PHYSICIAN ASSISTANT

## 2022-09-13 PROCEDURE — 73610 X-RAY EXAM OF ANKLE: CPT | Mod: RT

## 2022-09-13 RX ORDER — AZELASTINE HYDROCHLORIDE 0.5 MG/ML
1 SOLUTION/ DROPS OPHTHALMIC
COMMUNITY
Start: 2022-08-17

## 2022-09-13 RX ORDER — PREDNISONE 20 MG/1
40 TABLET ORAL DAILY
Qty: 10 TABLET | Refills: 0 | Status: SHIPPED | OUTPATIENT
Start: 2022-09-13 | End: 2022-09-18

## 2022-09-13 NOTE — PROGRESS NOTES
"URGENT CARE VISIT:    SUBJECTIVE:   Chief Complaint   Patient presents with     Musculoskeletal Problem     Rt top of foot and ankle on and off intense throbbing pain x 1 week. Pt states swelling at ankle started 2 months, unable to sleep, \"feels like it's on fire\", stabbing pain when walking; dull pain when sitting      Kendra Vaca is a 62 year old female who presents with a chief complaint of right ankle and foot pain and swelling.  Symptoms began 2 month(s) ago, are moderate and worsening  She started having pain this week.  Pain exacerbated by walking. Relieved by nothing.  She treated it initially with Tylenol. This is the first time this type of injury has occurred to this patient.     PMH:   Past Medical History:   Diagnosis Date     Abnormal gait 10/30/2014     Ankle fracture 1/16/2015     Ankle fracture 1/16/2015     Arthritis 1/16/2015     Bruises easily 1/16/2015     Cervical dystonia 10/30/2014     Chronic neck pain 1/16/2015     Disturbance of skin sensation 1/16/2015     Hepatitis 1966    ? CONTACT HEP FROM DRINKING OUT OF [employer]CL[employer] CUP     History of alcohol use 10/29/2014     History of biliary T-tube placement 1/12/2015    On MRI performed on 07/13/2014, she was found to have a C3-C4 large posterior disk herniation, slightly more on the right than on the left, with severe spinal canal stenosis and cord compression and extensive edema in the spinal cord from the upper C3 through mid C5.   MRI CERVICAL SPINE WITHOUT CONTRAST 7/13/2014 6:48 PM  HISTORY: Neck injury. Right neck and arm pain. Bilateral back pain. [place]     History of MRI of brain and brain stem 1/12/2015    MR BRAIN W/O & W CONTRAST 10/31/2014 10:32 AM History: Abnormal involuntary movements Comparison: None. Technique: Sagittal MPRAGE T1-weighted images with axial and coronal reconstructions and axial T1- and T2-weighted, turboFLAIR with fat saturation and susceptibility-weighted images were obtained without intravenous contrast. " Following intravenous gadolinium-based contrast administration, axial      History of MRI of neck 1/12/2015    On MRI performed on 07/13/2014, she was found to have a C3-C4 large posterior disk herniation, slightly more on the right than on the left, with severe spinal canal stenosis and cord compression and extensive edema in the spinal cord from the upper C3 through mid C5.      Insomnia 1/16/2015     Leg swelling 1/16/2015     Peripheral neuropathy 10/30/2014     Skin rash 1/16/2015     Tremor 10/29/2014    tremors that were initially worked up in 2009 by Neurology and attributed to alcohol.      Tremors     ?NERVE DAMAGE FROM NECK SURGERY     Urinary incontinence 1/16/2015     Urinary urgency 1/16/2015     Allergies: Ciprofloxacin, Morphine sulfate, Seafood, Shellfish-derived products, and Latex   Medications:   Current Outpatient Medications   Medication Sig Dispense Refill     azelastine (OPTIVAR) 0.05 % ophthalmic solution Apply 1 drop to eye       azelastine (OPTIVAR) 0.05 % SOLN Place 1 drop into both eyes as needed        EPINEPHrine (EPIPEN JR) 0.15 MG/0.3ML injection Inject 0.15 mg into the muscle as needed for anaphylaxis       ibuprofen (ADVIL/MOTRIN) 800 MG tablet Take 800 mg by mouth       ketoconazole (NIZORAL) 2 % external cream        penciclovir (DENAVIR) 1 % external cream Apply 1 g topically every 2 hours       predniSONE (DELTASONE) 20 MG tablet Take 2 tablets (40 mg) by mouth daily for 5 days 10 tablet 0     acetaminophen (TYLENOL) 500 MG tablet Take 500-1,000 mg by mouth every 6 hours as needed for mild pain (can take 4 per day) (Patient not taking: Reported on 9/13/2022)       Social History:   Social History     Tobacco Use     Smoking status: Current Every Day Smoker     Packs/day: 0.50     Years: 35.00     Pack years: 17.50     Types: Cigarettes     Smokeless tobacco: Never Used   Substance Use Topics     Alcohol use: Yes     Comment: 4-7 DRINKS/WEEK       ROS:  Review of systems  negative except as stated above.    OBJECTIVE:  /79 (BP Location: Right arm, Patient Position: Sitting, Cuff Size: Adult Regular)   Pulse 77   Temp 97.8  F (36.6  C) (Oral)   Resp 20   Wt 77.8 kg (171 lb 8 oz)   LMP 02/15/2012   SpO2 96%   BMI 28.54 kg/m    GENERAL APPEARANCE: healthy, alert and no distress  MUSCULOSKELETAL: moderate diffuse TTP over right ankle. FROM elicits pain. 5/5 ankle strength.  EXTREMITIES: peripheral pulses normal  SKIN: moderate effusion over right ankle.  NEURO: sensation intact     IMAGING:  Results for orders placed or performed during the hospital encounter of 09/13/22   XR Ankle Right G/E 3 Views     Status: None    Narrative    EXAM: XR FOOT RIGHT G/E 3 VIEWS, XR ANKLE RIGHT G/E 3 VIEWS  LOCATION: [facility]  DATE/TIME: 9/13/2022 3:51 PM    INDICATION:  Ankle swelling, right  COMPARISON: None.      Impression    IMPRESSION: No acute fracture. Small chronic avulsion fracture of the tip of the lateral malleolus. Old healed fracture deformity of the distal fibular diaphysis. Moderate soft tissue swelling along the lateral aspect of the ankle. Small accessory   navicular. Mild hallux valgus. Advanced degenerative changes at the tibiotalar joint.   Results for orders placed or performed during the hospital encounter of 09/13/22   XR Foot Right G/E 3 Views     Status: None    Narrative    EXAM: XR FOOT RIGHT G/E 3 VIEWS, XR ANKLE RIGHT G/E 3 VIEWS  LOCATION: [facility]  DATE/TIME: 9/13/2022 3:51 PM    INDICATION:  Ankle swelling, right  COMPARISON: None.      Impression    IMPRESSION: No acute fracture. Small chronic avulsion fracture of the tip of the lateral malleolus. Old healed fracture deformity of the distal fibular diaphysis. Moderate soft tissue swelling along the lateral aspect of the ankle. Small accessory   navicular. Mild hallux valgus. Advanced degenerative changes at the tibiotalar joint.   Results for orders  placed or performed in visit on 09/13/22   Uric acid     Status: Normal   Result Value Ref Range    Uric Acid 5.2 2.0 - 7.5 mg/dL   ESR: Erythrocyte sedimentation rate     Status: Normal   Result Value Ref Range    Erythrocyte Sedimentation Rate 13 0 - 20 mm/hr   CRP, inflammation     Status: Abnormal   Result Value Ref Range    CRP 1.1 (H) 0.0 - <0.8 mg/dL   CBC with platelets and differential     Status: Abnormal   Result Value Ref Range    WBC Count 9.3 4.0 - 11.0 10e3/uL    RBC Count 4.58 3.80 - 5.20 10e6/uL    Hemoglobin 13.0 11.7 - 15.7 g/dL    Hematocrit 42.6 35.0 - 47.0 %    MCV 93 78 - 100 fL    MCH 28.4 26.5 - 33.0 pg    MCHC 30.5 (L) 31.5 - 36.5 g/dL    RDW 13.0 10.0 - 15.0 %    Platelet Count 322 150 - 450 10e3/uL    % Neutrophils 63 %    % Lymphocytes 26 %    % Monocytes 6 %    % Eosinophils 4 %    % Basophils 1 %    % Immature Granulocytes 0 %    Absolute Neutrophils 5.9 1.6 - 8.3 10e3/uL    Absolute Lymphocytes 2.5 0.8 - 5.3 10e3/uL    Absolute Monocytes 0.6 0.0 - 1.3 10e3/uL    Absolute Eosinophils 0.4 0.0 - 0.7 10e3/uL    Absolute Basophils 0.1 0.0 - 0.2 10e3/uL    Absolute Immature Granulocytes 0.0 <=0.4 10e3/uL   CBC with platelets and differential     Status: Abnormal    Narrative    The following orders were created for panel order CBC with platelets and differential.  Procedure                               Abnormality         Status                     ---------                               -----------         ------                     CBC with platelets and d...[[id number]]  Abnormal            Final result                 Please view results for these tests on the individual orders.       ASSESSMENT:    ICD-10-CM    1. Ankle swelling, right  M25.471 CBC with platelets and differential     Uric acid     XR Ankle Right G/E 3 Views     ESR: Erythrocyte sedimentation rate     CRP, inflammation     XR Foot Right G/E 3 Views     CBC with platelets and differential     Uric acid     ESR:  Erythrocyte sedimentation rate     CRP, inflammation     predniSONE (DELTASONE) 20 MG tablet       PLAN:  30 minutes spent on the date of the encounter doing chart review, review of outside records, review of test results, interpretation of tests, patient visit and documentation.   Patient Instructions   Patient has right ankle swelling for 2 months with recent onset of pain this last week. CRP was slightly elevated. Cbc, ESR, uric acid, and x-ray were normal. She has no major risk factors for DVT. Trial of prednisone given. Side effects discussed. Conservative measures discussed including rest, ice, compression, elevation, and over-the-counter analgesics (Tylenol or Ibuprofen) as needed. See your primary care provider if symptoms worsen or do not improve in 7 days. Seek emergency care if you develop severe pain/swelling, inability to move extremity, skin paleness, or weakness.     Patient verbalized understanding and is agreeable to plan. The patient was discharged ambulatory and in stable condition.    Ansley Deutsch PA-C on 9/13/2022 at 5:53 PM

## 2022-09-13 NOTE — TELEPHONE ENCOUNTER
Reason for Call:  Appointment Request    Patient requesting this type of appt:  office    Requested provider: any provider    Reason patient unable to be scheduled: Not within requested timeframe    When does patient want to be seen/preferred time: Same day    Comments: apt scheduled for next Tuesday for Dr. Crisostomo at 130 pm  Advised pt to go to [facility] if pain becomes worse.     Could we send this information to you in [facility] or would you prefer to receive a phone call?:   Patient would prefer a phone call     Okay to leave a detailed message?: Yes at Home number on file [phone] (home)    Call taken on 9/13/2022 at 12:21 PM by Margot Fernando

## 2022-09-13 NOTE — PATIENT INSTRUCTIONS
Patient has right ankle swelling for 2 months with recent onset of pain this last week. CRP was slightly elevated. Cbc, ESR, uric acid, and x-ray were normal. She has no major risk factors for DVT. Trial of prednisone given. Side effects discussed. Conservative measures discussed including rest, ice, compression, elevation, and over-the-counter analgesics (Tylenol or Ibuprofen) as needed. See your primary care provider if symptoms worsen or do not improve in 7 days. Seek emergency care if you develop severe pain/swelling, inability to move extremity, skin paleness, or weakness.

## 2022-09-20 ENCOUNTER — OFFICE VISIT (OUTPATIENT)
Dept: FAMILY MEDICINE | Facility: CLINIC | Age: 62
End: 2022-09-20
Payer: MEDICARE

## 2022-09-20 VITALS
WEIGHT: 171.75 LBS | SYSTOLIC BLOOD PRESSURE: 112 MMHG | TEMPERATURE: 98 F | RESPIRATION RATE: 16 BRPM | BODY MASS INDEX: 28.61 KG/M2 | HEART RATE: 88 BPM | DIASTOLIC BLOOD PRESSURE: 70 MMHG | OXYGEN SATURATION: 98 % | HEIGHT: 65 IN

## 2022-09-20 DIAGNOSIS — M67.441 GANGLION CYST OF JOINT OF FINGER OF RIGHT HAND: ICD-10-CM

## 2022-09-20 DIAGNOSIS — M19.071 OSTEOARTHRITIS OF RIGHT ANKLE, UNSPECIFIED OSTEOARTHRITIS TYPE: Primary | ICD-10-CM

## 2022-09-20 PROCEDURE — 99214 OFFICE O/P EST MOD 30 MIN: CPT | Performed by: FAMILY MEDICINE

## 2022-09-20 NOTE — PROGRESS NOTES
"  Assessment & Plan     Osteoarthritis of right ankle, unspecified osteoarthritis type    - Orthopedic  Referral    Ganglion cyst of joint of finger of right hand    - Orthopedic  Referral        35 minutes spent on the date of the encounter doing chart review, review of test results, patient visit and documentation regarding osteoarthritis of the right ankle and cyst of the right thumb.       Nicotine/Tobacco Cessation:  She reports that she has been smoking cigarettes. She has a 17.50 pack-year smoking history. She has never used smokeless tobacco.        BMI:   Estimated body mass index is 28.58 kg/m  as calculated from the following:    Height as of this encounter: 1.651 m (5' 5\").    Weight as of this encounter: 77.9 kg (171 lb 12 oz).           Return in about 1 year (around 9/20/2023) for Routine preventive.    Salvador Crisostomo MD, MD  [facility] YOLY Gardner is a 62 year old, presenting for the following health issues:  Pain (Ankle and thumb swollen and very painful )      Musculoskeletal Problem    History of Present Illness       Reason for visit:  Ankle and thumb swollen    She eats 0-1 servings of fruits and vegetables daily.She consumes 1 sweetened beverage(s) daily.She exercises with enough effort to increase her heart rate 9 or less minutes per day.  She exercises with enough effort to increase her heart rate 3 or less days per week.   She is taking medications regularly.       She went to the walk-in clinic 9/13/22 because of pain and swelling of the right ankle.  She was prescribed prednisone 40 mg daily x5 days.  This was helpful, but pain resumed the day after prednisone stopped.  Ice packs have been a little bit helpful.  She has been taking ibuprofen twice daily.  Elevating her ankle has not helped.  Pain is mainly in the front of the ankle.  Swelling has been below the lateral malleolus.  She did not have injury to the area.  Uric acid, sed " "rate, CBC were normal.  CRP was slightly elevated at 1.1.  X-ray of the right ankle and foot demonstrated degenerative arthritis of the tibiotalar joint.  Old avulsion fracture from the lateral malleolus, and old healed fracture of the fibula.    She also notes increasing size of cyst on the right thumb over the past few months.  This has become increasingly painful.    Current Outpatient Medications   Medication Sig Dispense Refill     azelastine (OPTIVAR) 0.05 % ophthalmic solution Apply 1 drop to eye       azelastine (OPTIVAR) 0.05 % SOLN Place 1 drop into both eyes as needed        EPINEPHrine (EPIPEN JR) 0.15 MG/0.3ML injection Inject 0.15 mg into the muscle as needed for anaphylaxis       ibuprofen (ADVIL/MOTRIN) 800 MG tablet Take 800 mg by mouth       ketoconazole (NIZORAL) 2 % external cream        penciclovir (DENAVIR) 1 % external cream Apply 1 g topically every 2 hours       acetaminophen (TYLENOL) 500 MG tablet Take 500-1,000 mg by mouth every 6 hours as needed for mild pain (can take 4 per day) (Patient not taking: No sig reported)           Review of Systems   No fever or cough.      Objective    /70 (BP Location: Left arm, Patient Position: Sitting, Cuff Size: Adult Regular)   Pulse 88   Temp 98  F (36.7  C) (Oral)   Resp 16   Ht 1.651 m (5' 5\")   Wt 77.9 kg (171 lb 12 oz)   LMP 02/15/2012   SpO2 98%   BMI 28.58 kg/m    Body mass index is 28.58 kg/m .  Physical Exam   Right ankle with no bony tenderness of the malleoli.  Moderate swelling below the lateral malleolus.  Pain with dorsiflexion or plantarflexion of the ankle.  No erythema or increased warmth.  Right great toe MCP joint has mild bunion.    There is a 6 mm cyst of the right thumb IP joint dorsal aspect.  This is tender.                "

## 2022-09-23 ENCOUNTER — OFFICE VISIT (OUTPATIENT)
Dept: PODIATRY | Facility: CLINIC | Age: 62
End: 2022-09-23
Attending: FAMILY MEDICINE
Payer: MEDICARE

## 2022-09-23 VITALS — OXYGEN SATURATION: 95 % | HEART RATE: 50 BPM

## 2022-09-23 DIAGNOSIS — M19.071 OSTEOARTHRITIS OF RIGHT ANKLE, UNSPECIFIED OSTEOARTHRITIS TYPE: ICD-10-CM

## 2022-09-23 PROCEDURE — 20605 DRAIN/INJ JOINT/BURSA W/O US: CPT | Mod: RT | Performed by: PODIATRIST

## 2022-09-23 PROCEDURE — 99203 OFFICE O/P NEW LOW 30 MIN: CPT | Mod: 25 | Performed by: PODIATRIST

## 2022-09-23 ASSESSMENT — PAIN SCALES - GENERAL: PAINLEVEL: SEVERE PAIN (6)

## 2022-09-23 NOTE — LETTER
9/23/2022         RE: Kendra Vaca  [address] [facility] [address]        Dear Colleague,    Thank you for referring your patient, Kendra Vaca, to the [facility]. Please see a copy of my visit note below.          FOOT AND ANKLE SURGERY/PODIATRY CONSULT NOTE         ASSESSMENT: Capsulitis right ankle      TREATMENT:  -There is pain along the anterior right ankle with mild edema.     -I reviewed the patient's x-rays from 9/13 which are negative for fracture.    -I recommend a steroid injection today, she consents. Injected 1 cc Kenalog 40/0.5 cc 2% lidocaine plain right ankle. Patient tolerated the procedure well. Band aid applied.     -CAM boot dispensed, limited walking.     -Patient's questions invited and answered. Patient to return to clinic in 2 week(s) for re-evaluation. She was encouraged to call my office with any further questions or concerns.     Francisco Guan DPM  [facility] Podiatry/Foot & Ankle Surgery      HPI: I was asked to see Kendra Vaca today for right ankle pain. She reports first noticing swelling two weeks ago, with increasing pain over the past week. Denies trauma. She admits trying icing with no improvement. She did recently finish a five day course of prednisone which reduced some of her pain.       Past Medical History:   Diagnosis Date     Abnormal gait 10/30/2014     Ankle fracture 1/16/2015     Ankle fracture 1/16/2015     Arthritis 1/16/2015     Bruises easily 1/16/2015     Cervical dystonia 10/30/2014     Chronic neck pain 1/16/2015     Disturbance of skin sensation 1/16/2015     Hepatitis 1966    ? CONTACT HEP FROM DRINKING OUT OF UNCLES CUP     History of alcohol use 10/29/2014     History of biliary T-tube placement 1/12/2015    On MRI performed on 07/13/2014, she was found to have a C3-C4 large posterior disk herniation, slightly more on the right than on the left, with severe spinal canal stenosis and cord compression  and extensive edema in the spinal cord from the upper C3 through mid C5.   MRI CERVICAL SPINE WITHOUT CONTRAST 7/13/2014 6:48 PM  HISTORY: Neck injury. Right neck and arm pain. Bilateral back pain. [place]lin     History of MRI of brain and brain stem 1/12/2015    MR BRAIN W/O & W CONTRAST 10/31/2014 10:32 AM History: Abnormal involuntary movements Comparison: None. Technique: Sagittal MPRAGE T1-weighted images with axial and coronal reconstructions and axial T1- and T2-weighted, turboFLAIR with fat saturation and susceptibility-weighted images were obtained without intravenous contrast. Following intravenous gadolinium-based contrast administration, axial      History of MRI of neck 1/12/2015    On MRI performed on 07/13/2014, she was found to have a C3-C4 large posterior disk herniation, slightly more on the right than on the left, with severe spinal canal stenosis and cord compression and extensive edema in the spinal cord from the upper C3 through mid C5.      Insomnia 1/16/2015     Leg swelling 1/16/2015     Peripheral neuropathy 10/30/2014     Skin rash 1/16/2015     Tremor 10/29/2014    tremors that were initially worked up in 2009 by Neurology and attributed to alcohol.      Tremors     ?NERVE DAMAGE FROM NECK SURGERY     Urinary incontinence 1/16/2015     Urinary urgency 1/16/2015         Social History     Socioeconomic History     Marital status: Single     Spouse name: Not on file     Number of children: Not on file     Years of education: Not on file     Highest education level: Not on file   Occupational History     Not on file   Tobacco Use     Smoking status: Current Every Day Smoker     Packs/day: 0.50     Years: 35.00     Pack years: 17.50     Types: Cigarettes     Smokeless tobacco: Never Used   Substance and Sexual Activity     Alcohol use: Yes     Comment: 4-7 DRINKS/WEEK     Drug use: No     Comment: in the past      Sexual activity: Not on file   Other Topics Concern     Parent/sibling w/ CABG,  MI or angioplasty before 65F 55M? Not Asked   Social History Narrative    From 2009        She has a history of ankle fractures, history of tonsillectomy and appendectomy.         Family History:  Her father is 68 and mother is 67. She has two sisters ages 47 and 42. She has a brother age 44. She has three sons ages 27, 25 and 24. She had a son who was toddler with tremor which resolved in the third grade.         Habits: The patient has had substance issues including crack cocaine. She has had three DWI's. She does drink whisky a couple times a week with soda. Alcohol seems to help her with stressful episodes.         Social History:  She is . Her ex was an alcohol. Per her boyfriends report, they have been together for 11 years. The patient is on disability. Mother and step dad    alcoho in the past; continues to smoke.            Fabricio Timmons; significant other        Lives in [place]         Social Determinants of Health     Financial Resource Strain: Not on file   Food Insecurity: Not on file   Transportation Needs: Not on file   Physical Activity: Not on file   Stress: Not on file   Social Connections: Not on file   Intimate Partner Violence: Not on file   Housing Stability: Not on file            Allergies   Allergen Reactions     Ciprofloxacin Anaphylaxis and Other (See Comments)     Throat swelling       Morphine Sulfate Hives     Seafood Hives     Shellfish-Derived Products Anaphylaxis     Latex      CONTACT RASH WITH LATEX PRODUCTS IN THE HEAT OF SUMMER         MEDICATIONS:   Current Outpatient Medications   Medication     acetaminophen (TYLENOL) 500 MG tablet     azelastine (OPTIVAR) 0.05 % ophthalmic solution     azelastine (OPTIVAR) 0.05 % SOLN     EPINEPHrine (EPIPEN JR) 0.15 MG/0.3ML injection     ibuprofen (ADVIL/MOTRIN) 800 MG tablet     ketoconazole (NIZORAL) 2 % external cream     penciclovir (DENAVIR) 1 % external cream     Current Facility-Administered Medications   Medication      botulinum toxin type A (BOTOX) 100 units injection 1,200 Units     botulinum toxin type A (BOTOX) 100 units injection 300 Units        Family History   Problem Relation Age of Onset     Neurologic Disorder Paternal Grandfather         tremor     Aneurysm Other      Cerebrovascular Disease Other         hemorrhage     Cancer Other         grandfather     Heart Disease Other         father     Heart Disease Other         mother     Diabetes Other         grandmother     Neurologic Disorder Son         tremor     Breast Cancer Paternal Aunt           Review of Systems - 10 point Review of Systems is negative except for right ankle pain which is noted in HPI.    OBJECTIVE:  Appearance: alert, well appearing, and in no distress.    VITAL SIGNS: Pulse 50   LMP 02/15/2012   SpO2 95%       General appearance: Patient is alert and fully cooperative with history & exam.  No sign of distress is noted during the visit.     Psychiatric: Affect is pleasant & appropriate.  Patient appears motivated to improve health.     Respiratory: Breathing is regular & unlabored while sitting.     HEENT: Hearing is intact to spoken word.  Speech is clear.  No gross evidence of visual impairment that would impact ambulation.      Vascular: Dorsalis pedis and posterior tibial pulses are palpable. There is pedal hair growth right.  CFT < 3 sec from anterior tibial surface to distal digits right. Mild edema right ankle.   Dermatologic: Turgor and texture are within normal limits. No coloration or temperature changes. No primary or secondary lesions noted.  Neurologic: All epicritic and proprioceptive sensations are grossly intact right.  Musculoskeletal: Pain to palpation anterior right ankle, including ROM which is guarded.             Again, thank you for allowing me to participate in the care of your patient.        Sincerely,        Francisco Guan DPM

## 2022-09-23 NOTE — PROGRESS NOTES
FOOT AND ANKLE SURGERY/PODIATRY CONSULT NOTE         ASSESSMENT: Capsulitis right ankle      TREATMENT:  -There is pain along the anterior right ankle with mild edema.     -I reviewed the patient's x-rays from 9/13 which are negative for fracture.    -I recommend a steroid injection today, she consents. Injected 1 cc Kenalog 40/0.5 cc 2% lidocaine plain right ankle. Patient tolerated the procedure well. Band aid applied.     -CAM boot dispensed, limited walking.     -Patient's questions invited and answered. Patient to return to clinic in 2 week(s) for re-evaluation. She was encouraged to call my office with any further questions or concerns.     Francisco Guan DPM  [facility] Podiatry/Foot & Ankle Surgery      HPI: I was asked to see Kendra Vaca today for right ankle pain. She reports first noticing swelling two weeks ago, with increasing pain over the past week. Denies trauma. She admits trying icing with no improvement. She did recently finish a five day course of prednisone which reduced some of her pain.       Past Medical History:   Diagnosis Date     Abnormal gait 10/30/2014     Ankle fracture 1/16/2015     Ankle fracture 1/16/2015     Arthritis 1/16/2015     Bruises easily 1/16/2015     Cervical dystonia 10/30/2014     Chronic neck pain 1/16/2015     Disturbance of skin sensation 1/16/2015     Hepatitis 1966    ? CONTACT HEP FROM DRINKING OUT OF UNCLES CUP     History of alcohol use 10/29/2014     History of biliary T-tube placement 1/12/2015    On MRI performed on 07/13/2014, she was found to have a C3-C4 large posterior disk herniation, slightly more on the right than on the left, with severe spinal canal stenosis and cord compression and extensive edema in the spinal cord from the upper C3 through mid C5.   MRI CERVICAL SPINE WITHOUT CONTRAST 7/13/2014 6:48 PM  HISTORY: Neck injury. Right neck and arm pain. Bilateral back pain. [place]     History of MRI of brain and brain stem  1/12/2015    MR BRAIN W/O & W CONTRAST 10/31/2014 10:32 AM History: Abnormal involuntary movements Comparison: None. Technique: Sagittal MPRAGE T1-weighted images with axial and coronal reconstructions and axial T1- and T2-weighted, turboFLAIR with fat saturation and susceptibility-weighted images were obtained without intravenous contrast. Following intravenous gadolinium-based contrast administration, axial      History of MRI of neck 1/12/2015    On MRI performed on 07/13/2014, she was found to have a C3-C4 large posterior disk herniation, slightly more on the right than on the left, with severe spinal canal stenosis and cord compression and extensive edema in the spinal cord from the upper C3 through mid C5.      Insomnia 1/16/2015     Leg swelling 1/16/2015     Peripheral neuropathy 10/30/2014     Skin rash 1/16/2015     Tremor 10/29/2014    tremors that were initially worked up in 2009 by Neurology and attributed to alcohol.      Tremors     ?NERVE DAMAGE FROM NECK SURGERY     Urinary incontinence 1/16/2015     Urinary urgency 1/16/2015         Social History     Socioeconomic History     Marital status: Single     Spouse name: Not on file     Number of children: Not on file     Years of education: Not on file     Highest education level: Not on file   Occupational History     Not on file   Tobacco Use     Smoking status: Current Every Day Smoker     Packs/day: 0.50     Years: 35.00     Pack years: 17.50     Types: Cigarettes     Smokeless tobacco: Never Used   Substance and Sexual Activity     Alcohol use: Yes     Comment: 4-7 DRINKS/WEEK     Drug use: No     Comment: in the past      Sexual activity: Not on file   Other Topics Concern     Parent/sibling w/ CABG, MI or angioplasty before 65F 55M? Not Asked   Social History Narrative    From 2009        She has a history of ankle fractures, history of tonsillectomy and appendectomy.         Family History:  Her father is 68 and mother is 67. She has two  sisters ages 47 and 42. She has a brother age 44. She has three sons ages 27, 25 and 24. She had a son who was toddler with tremor which resolved in the third grade.         Habits: The patient has had substance issues including crack cocaine. She has had three DWI's. She does drink whisky a couple times a week with soda. Alcohol seems to help her with stressful episodes.         Social History:  She is . Her ex was an alcohol. Per her boyfriends report, they have been together for 11 years. The patient is on disability. Mother and step dad    breeket in the past; continues to smoke.            Fabricio Timmons; significant other        Lives in [place]         Social Determinants of Health     Financial Resource Strain: Not on file   Food Insecurity: Not on file   Transportation Needs: Not on file   Physical Activity: Not on file   Stress: Not on file   Social Connections: Not on file   Intimate Partner Violence: Not on file   Housing Stability: Not on file            Allergies   Allergen Reactions     Ciprofloxacin Anaphylaxis and Other (See Comments)     Throat swelling       Morphine Sulfate Hives     Seafood Hives     Shellfish-Derived Products Anaphylaxis     Latex      CONTACT RASH WITH LATEX PRODUCTS IN THE HEAT OF SUMMER         MEDICATIONS:   Current Outpatient Medications   Medication     acetaminophen (TYLENOL) 500 MG tablet     azelastine (OPTIVAR) 0.05 % ophthalmic solution     azelastine (OPTIVAR) 0.05 % SOLN     EPINEPHrine (EPIPEN JR) 0.15 MG/0.3ML injection     ibuprofen (ADVIL/MOTRIN) 800 MG tablet     ketoconazole (NIZORAL) 2 % external cream     penciclovir (DENAVIR) 1 % external cream     Current Facility-Administered Medications   Medication     botulinum toxin type A (BOTOX) 100 units injection 1,200 Units     botulinum toxin type A (BOTOX) 100 units injection 300 Units        Family History   Problem Relation Age of Onset     Neurologic Disorder Paternal Grandfather         tremor      Aneurysm Other      Cerebrovascular Disease Other         hemorrhage     Cancer Other         grandfather     Heart Disease Other         father     Heart Disease Other         mother     Diabetes Other         grandmother     Neurologic Disorder Son         tremor     Breast Cancer Paternal Aunt           Review of Systems - 10 point Review of Systems is negative except for right ankle pain which is noted in HPI.    OBJECTIVE:  Appearance: alert, well appearing, and in no distress.    VITAL SIGNS: Pulse 50   LMP 02/15/2012   SpO2 95%       General appearance: Patient is alert and fully cooperative with history & exam.  No sign of distress is noted during the visit.     Psychiatric: Affect is pleasant & appropriate.  Patient appears motivated to improve health.     Respiratory: Breathing is regular & unlabored while sitting.     HEENT: Hearing is intact to spoken word.  Speech is clear.  No gross evidence of visual impairment that would impact ambulation.      Vascular: Dorsalis pedis and posterior tibial pulses are palpable. There is pedal hair growth right.  CFT < 3 sec from anterior tibial surface to distal digits right. Mild edema right ankle.   Dermatologic: Turgor and texture are within normal limits. No coloration or temperature changes. No primary or secondary lesions noted.  Neurologic: All epicritic and proprioceptive sensations are grossly intact right.  Musculoskeletal: Pain to palpation anterior right ankle, including ROM which is guarded.

## 2022-10-10 ENCOUNTER — OFFICE VISIT (OUTPATIENT)
Dept: PODIATRY | Facility: CLINIC | Age: 62
End: 2022-10-10
Payer: MEDICARE

## 2022-10-10 VITALS — OXYGEN SATURATION: 98 % | HEART RATE: 80 BPM

## 2022-10-10 DIAGNOSIS — M19.071 OSTEOARTHRITIS OF RIGHT ANKLE, UNSPECIFIED OSTEOARTHRITIS TYPE: Primary | ICD-10-CM

## 2022-10-10 PROCEDURE — 99213 OFFICE O/P EST LOW 20 MIN: CPT | Performed by: PODIATRIST

## 2022-10-10 RX ORDER — NAPROXEN 500 MG/1
500 TABLET ORAL 2 TIMES DAILY WITH MEALS
Qty: 28 TABLET | Refills: 0 | Status: SHIPPED | OUTPATIENT
Start: 2022-10-10 | End: 2022-10-10

## 2022-10-10 RX ORDER — NAPROXEN 500 MG/1
500 TABLET ORAL 2 TIMES DAILY WITH MEALS
Qty: 28 TABLET | Refills: 0 | Status: SHIPPED | OUTPATIENT
Start: 2022-10-10

## 2022-10-10 ASSESSMENT — PAIN SCALES - GENERAL: PAINLEVEL: MILD PAIN (2)

## 2022-10-10 NOTE — LETTER
10/10/2022         RE: Kendra Vaca  [address] [facility] [address]        Dear Colleague,    Thank you for referring your patient, Kendra Vaca, to the [facility]. Please see a copy of my visit note below.        FOOT AND ANKLE SURGERY/PODIATRY PROGRESS NOTE        ASSESSMENT: Capsulitis right ankle      TREATMENT:  -Patient reports no pain walking in the CAM boot which is an improvement since her last visit. But she continues to have pain on exam today.     -We discussed that her symptoms are likely due to increased walking recently while moving.     -I will start her on Naproxen. Recommend limited walking in the CAM boot.     -Also referred to physical therapy.     -Patient's questions invited and answered. Patient to return to clinic after 4-5 PT visits for re-evaluation. She was encouraged to call my office with any further questions or concerns.     Francisco Guan DPM  [facility] Podiatry/Foot & Ankle Surgery      HPI: Kendra Vaca was seen again today for right ankle pain. Since her last visit, she has noticed no pain walking in the CAM boot and improvement after the previous steroid injection. She has had an increase in symptoms recently while moving into a new apartment.     Past Medical History:   Diagnosis Date     Abnormal gait 10/30/2014     Ankle fracture 1/16/2015     Ankle fracture 1/16/2015     Arthritis 1/16/2015     Bruises easily 1/16/2015     Cervical dystonia 10/30/2014     Chronic neck pain 1/16/2015     Disturbance of skin sensation 1/16/2015     Hepatitis 1966    ? CONTACT HEP FROM DRINKING OUT OF UNCLES CUP     History of alcohol use 10/29/2014     History of biliary T-tube placement 1/12/2015    On MRI performed on 07/13/2014, she was found to have a C3-C4 large posterior disk herniation, slightly more on the right than on the left, with severe spinal canal stenosis and cord compression and extensive edema in the spinal cord  from the upper C3 through mid C5.   MRI CERVICAL SPINE WITHOUT CONTRAST 7/13/2014 6:48 PM  HISTORY: Neck injury. Right neck and arm pain. Bilateral back pain. Tinglin     History of MRI of brain and brain stem 1/12/2015    MR BRAIN W/O & W CONTRAST 10/31/2014 10:32 AM History: Abnormal involuntary movements Comparison: None. Technique: Sagittal MPRAGE T1-weighted images with axial and coronal reconstructions and axial T1- and T2-weighted, turboFLAIR with fat saturation and susceptibility-weighted images were obtained without intravenous contrast. Following intravenous gadolinium-based contrast administration, axial      History of MRI of neck 1/12/2015    On MRI performed on 07/13/2014, she was found to have a C3-C4 large posterior disk herniation, slightly more on the right than on the left, with severe spinal canal stenosis and cord compression and extensive edema in the spinal cord from the upper C3 through mid C5.      Insomnia 1/16/2015     Leg swelling 1/16/2015     Peripheral neuropathy 10/30/2014     Skin rash 1/16/2015     Tremor 10/29/2014    tremors that were initially worked up in 2009 by Neurology and attributed to alcohol.      Tremors     ?NERVE DAMAGE FROM NECK SURGERY     Urinary incontinence 1/16/2015     Urinary urgency 1/16/2015       Past Surgical History:   Procedure Laterality Date     ------------OTHER-------------      left-sided ACDF 20 yrs ago     APPENDECTOMY  17 yrs of age     BIOPSY BREAST Right 2012     BIOPSY BREAST NEEDLE LOCALIZATION  3/21/2012    Procedure:BIOPSY BREAST NEEDLE LOCALIZATION; Right Breast Wire Localized Excisional Biopsy; Surgeon:CARMEN OSCAR; Location:[facility] OR     ENT SURGERY  17 yrs of age    TONSILECTOMY     GYN SURGERY  25YRS AGO    BTL     ORTHOPEDIC SURGERY      ANKLE FRACTURE LT     ORTHOPEDIC SURGERY  15 yrs ago    NECK FX-TITANIUM PLATE-C5 C6     SOFT TISSUE SURGERY      RT HAND CYST REMOVED       Allergies   Allergen Reactions     Ciprofloxacin Anaphylaxis  and Other (See Comments)     Throat swelling       Morphine Sulfate Hives     Seafood Hives     Shellfish-Derived Products Anaphylaxis     Latex      CONTACT RASH WITH LATEX PRODUCTS IN THE HEAT OF SUMMER         Current Outpatient Medications:      acetaminophen (TYLENOL) 500 MG tablet, Take 500-1,000 mg by mouth every 6 hours as needed for mild pain (can take 4 per day), Disp: , Rfl:      azelastine (OPTIVAR) 0.05 % ophthalmic solution, Apply 1 drop to eye, Disp: , Rfl:      azelastine (OPTIVAR) 0.05 % SOLN, Place 1 drop into both eyes as needed , Disp: , Rfl:      EPINEPHrine (EPIPEN JR) 0.15 MG/0.3ML injection, Inject 0.15 mg into the muscle as needed for anaphylaxis, Disp: , Rfl:      ibuprofen (ADVIL/MOTRIN) 800 MG tablet, Take 800 mg by mouth, Disp: , Rfl:      ketoconazole (NIZORAL) 2 % external cream, , Disp: , Rfl:      naproxen (NAPROSYN) 500 MG tablet, Take 1 tablet (500 mg) by mouth 2 times daily (with meals), Disp: 28 tablet, Rfl: 0     penciclovir (DENAVIR) 1 % external cream, Apply 1 g topically every 2 hours, Disp: , Rfl:     Current Facility-Administered Medications:      botulinum toxin type A (BOTOX) 100 units injection 1,200 Units, 1,200 Units, Intramuscular, Q90 Days, Virginia Rosas MD     botulinum toxin type A (BOTOX) 100 units injection 300 Units, 300 Units, Intramuscular, Q90 Days, Virginia Rosas MD, 200 Units at 05/26/22 2871    Family History   Problem Relation Age of Onset     Neurologic Disorder Paternal Grandfather         tremor     Aneurysm Other      Cerebrovascular Disease Other         hemorrhage     Cancer Other         grandfather     Heart Disease Other         father     Heart Disease Other         mother     Diabetes Other         grandmother     Neurologic Disorder Son         tremor     Breast Cancer Paternal Aunt        Social History     Socioeconomic History     Marital status: Single     Spouse name: Not on file     Number of children: Not on file      Years of education: Not on file     Highest education level: Not on file   Occupational History     Not on file   Tobacco Use     Smoking status: Every Day     Packs/day: 0.50     Years: 35.00     Pack years: 17.50     Types: Cigarettes     Smokeless tobacco: Never   Substance and Sexual Activity     Alcohol use: Yes     Comment: 4-7 DRINKS/WEEK     Drug use: No     Comment: in the past      Sexual activity: Not on file   Other Topics Concern     Parent/sibling w/ CABG, MI or angioplasty before 65F 55M? Not Asked   Social History Narrative    From 2009        She has a history of ankle fractures, history of tonsillectomy and appendectomy.         Family History:  Her father is 68 and mother is 67. She has two sisters ages 47 and 42. She has a brother age 44. She has three sons ages 27, 25 and 24. She had a son who was toddler with tremor which resolved in the third grade.         Habits: The patient has had substance issues including crack cocaine. She has had three DWI's. She does drink whisky a couple times a week with soda. Alcohol seems to help her with stressful episodes.         Social History:  She is . Her ex was an alcohol. Per her boyfriends report, they have been together for 11 years. The patient is on disability. Mother and step dad    alcoho in the past; continues to smoke.            Fabricio Timmons; significant other        Lives in [place]         Social Determinants of Health     Financial Resource Strain: Not on file   Food Insecurity: Not on file   Transportation Needs: Not on file   Physical Activity: Not on file   Stress: Not on file   Social Connections: Not on file   Intimate Partner Violence: Not on file   Housing Stability: Not on file       10 point Review of Systems is negative except for right ankle pain which is noted in HPI.     Pulse 80   LMP 02/15/2012   SpO2 98%     BMI= There is no height or weight on file to calculate BMI.    OBJECTIVE:  General appearance: Patient is  alert and fully cooperative with history & exam.  No sign of distress is noted during the visit.    Vascular: Dorsalis pedis and posterior tibial pulses are palpable. There is pedal hair growth right.  CFT < 3 sec from anterior tibial surface to distal digits right. There is no appreciable edema noted.  Dermatologic: Turgor and texture are within normal limits. No coloration or temperature changes. No primary or secondary lesions noted.  Neurologic: All epicritic and proprioceptive sensations are grossly intact right.  Musculoskeletal: Pain anterior right ankle.     Imaging:     XR Foot Right G/E 3 Views    Result Date: 9/13/2022  EXAM: XR FOOT RIGHT G/E 3 VIEWS, XR ANKLE RIGHT G/E 3 VIEWS LOCATION: [facility] DATE/TIME: 9/13/2022 3:51 PM INDICATION:  Ankle swelling, right COMPARISON: None.     IMPRESSION: No acute fracture. Small chronic avulsion fracture of the tip of the lateral malleolus. Old healed fracture deformity of the distal fibular diaphysis. Moderate soft tissue swelling along the lateral aspect of the ankle. Small accessory navicular. Mild hallux valgus. Advanced degenerative changes at the tibiotalar joint.    XR Ankle Right G/E 3 Views    Result Date: 9/13/2022  EXAM: XR FOOT RIGHT G/E 3 VIEWS, XR ANKLE RIGHT G/E 3 VIEWS LOCATION: [facility] DATE/TIME: 9/13/2022 3:51 PM INDICATION:  Ankle swelling, right COMPARISON: None.     IMPRESSION: No acute fracture. Small chronic avulsion fracture of the tip of the lateral malleolus. Old healed fracture deformity of the distal fibular diaphysis. Moderate soft tissue swelling along the lateral aspect of the ankle. Small accessory navicular. Mild hallux valgus. Advanced degenerative changes at the tibiotalar joint.           Again, thank you for allowing me to participate in the care of your patient.        Sincerely,        Francisco Guan DPM

## 2022-10-10 NOTE — PROGRESS NOTES
FOOT AND ANKLE SURGERY/PODIATRY PROGRESS NOTE        ASSESSMENT: Capsulitis right ankle      TREATMENT:  -Patient reports no pain walking in the CAM boot which is an improvement since her last visit. But she continues to have pain on exam today.     -We discussed that her symptoms are likely due to increased walking recently while moving.     -I will start her on Naproxen. Recommend limited walking in the CAM boot.     -Also referred to physical therapy.     -Patient's questions invited and answered. Patient to return to clinic after 4-5 PT visits for re-evaluation. She was encouraged to call my office with any further questions or concerns.     Francisco Guan DPM  [facility] Podiatry/Foot & Ankle Surgery      HPI: Kendra Vaca was seen again today for right ankle pain. Since her last visit, she has noticed no pain walking in the CAM boot and improvement after the previous steroid injection. She has had an increase in symptoms recently while moving into a new apartment.     Past Medical History:   Diagnosis Date     Abnormal gait 10/30/2014     Ankle fracture 1/16/2015     Ankle fracture 1/16/2015     Arthritis 1/16/2015     Bruises easily 1/16/2015     Cervical dystonia 10/30/2014     Chronic neck pain 1/16/2015     Disturbance of skin sensation 1/16/2015     Hepatitis 1966    ? CONTACT HEP FROM DRINKING OUT OF UNCLES CUP     History of alcohol use 10/29/2014     History of biliary T-tube placement 1/12/2015    On MRI performed on 07/13/2014, she was found to have a C3-C4 large posterior disk herniation, slightly more on the right than on the left, with severe spinal canal stenosis and cord compression and extensive edema in the spinal cord from the upper C3 through mid C5.   MRI CERVICAL SPINE WITHOUT CONTRAST 7/13/2014 6:48 PM  HISTORY: Neck injury. Right neck and arm pain. Bilateral back pain. Tinglin     History of MRI of brain and brain stem 1/12/2015    MR BRAIN W/O & W CONTRAST 10/31/2014  10:32 AM History: Abnormal involuntary movements Comparison: None. Technique: Sagittal MPRAGE T1-weighted images with axial and coronal reconstructions and axial T1- and T2-weighted, turboFLAIR with fat saturation and susceptibility-weighted images were obtained without intravenous contrast. Following intravenous gadolinium-based contrast administration, axial      History of MRI of neck 1/12/2015    On MRI performed on 07/13/2014, she was found to have a C3-C4 large posterior disk herniation, slightly more on the right than on the left, with severe spinal canal stenosis and cord compression and extensive edema in the spinal cord from the upper C3 through mid C5.      Insomnia 1/16/2015     Leg swelling 1/16/2015     Peripheral neuropathy 10/30/2014     Skin rash 1/16/2015     Tremor 10/29/2014    tremors that were initially worked up in 2009 by Neurology and attributed to alcohol.      Tremors     ?NERVE DAMAGE FROM NECK SURGERY     Urinary incontinence 1/16/2015     Urinary urgency 1/16/2015       Past Surgical History:   Procedure Laterality Date     ------------OTHER-------------      left-sided ACDF 20 yrs ago     APPENDECTOMY  17 yrs of age     BIOPSY BREAST Right 2012     BIOPSY BREAST NEEDLE LOCALIZATION  3/21/2012    Procedure:BIOPSY BREAST NEEDLE LOCALIZATION; Right Breast Wire Localized Excisional Biopsy; Surgeon:CARMEN OSCRA; Location:[facility] OR     ENT SURGERY  17 yrs of age    TONSILECTOMY     GYN SURGERY  25YRS AGO    BTL     ORTHOPEDIC SURGERY      ANKLE FRACTURE LT     ORTHOPEDIC SURGERY  15 yrs ago    NECK FX-TITANIUM PLATE-C5 C6     SOFT TISSUE SURGERY      RT HAND CYST REMOVED       Allergies   Allergen Reactions     Ciprofloxacin Anaphylaxis and Other (See Comments)     Throat swelling       Morphine Sulfate Hives     Seafood Hives     Shellfish-Derived Products Anaphylaxis     Latex      CONTACT RASH WITH LATEX PRODUCTS IN THE HEAT OF SUMMER         Current Outpatient Medications:       acetaminophen (TYLENOL) 500 MG tablet, Take 500-1,000 mg by mouth every 6 hours as needed for mild pain (can take 4 per day), Disp: , Rfl:      azelastine (OPTIVAR) 0.05 % ophthalmic solution, Apply 1 drop to eye, Disp: , Rfl:      azelastine (OPTIVAR) 0.05 % SOLN, Place 1 drop into both eyes as needed , Disp: , Rfl:      EPINEPHrine (EPIPEN JR) 0.15 MG/0.3ML injection, Inject 0.15 mg into the muscle as needed for anaphylaxis, Disp: , Rfl:      ibuprofen (ADVIL/MOTRIN) 800 MG tablet, Take 800 mg by mouth, Disp: , Rfl:      ketoconazole (NIZORAL) 2 % external cream, , Disp: , Rfl:      naproxen (NAPROSYN) 500 MG tablet, Take 1 tablet (500 mg) by mouth 2 times daily (with meals), Disp: 28 tablet, Rfl: 0     penciclovir (DENAVIR) 1 % external cream, Apply 1 g topically every 2 hours, Disp: , Rfl:     Current Facility-Administered Medications:      botulinum toxin type A (BOTOX) 100 units injection 1,200 Units, 1,200 Units, Intramuscular, Q90 Days, Virginia Rosas MD     botulinum toxin type A (BOTOX) 100 units injection 300 Units, 300 Units, Intramuscular, Q90 Days, Virginia Rosas MD, 200 Units at 05/26/22 6159    Family History   Problem Relation Age of Onset     Neurologic Disorder Paternal Grandfather         tremor     Aneurysm Other      Cerebrovascular Disease Other         hemorrhage     Cancer Other         grandfather     Heart Disease Other         father     Heart Disease Other         mother     Diabetes Other         grandmother     Neurologic Disorder Son         tremor     Breast Cancer Paternal Aunt        Social History     Socioeconomic History     Marital status: Single     Spouse name: Not on file     Number of children: Not on file     Years of education: Not on file     Highest education level: Not on file   Occupational History     Not on file   Tobacco Use     Smoking status: Every Day     Packs/day: 0.50     Years: 35.00     Pack years: 17.50     Types: Cigarettes      Smokeless tobacco: Never   Substance and Sexual Activity     Alcohol use: Yes     Comment: 4-7 DRINKS/WEEK     Drug use: No     Comment: in the past      Sexual activity: Not on file   Other Topics Concern     Parent/sibling w/ CABG, MI or angioplasty before 65F 55M? Not Asked   Social History Narrative    From 2009        She has a history of ankle fractures, history of tonsillectomy and appendectomy.         Family History:  Her father is 68 and mother is 67. She has two sisters ages 47 and 42. She has a brother age 44. She has three sons ages 27, 25 and 24. She had a son who was toddler with tremor which resolved in the third grade.         Habits: The patient has had substance issues including crack cocaine. She has had three DWI's. She does drink whisky a couple times a week with soda. Alcohol seems to help her with stressful episodes.         Social History:  She is . Her ex was an alcohol. Per her boyfriends report, they have been together for 11 years. The patient is on disability. Mother and step dad    alcoho in the past; continues to smoke.            Fabricio Timmons; significant other        Lives in [place]         Social Determinants of Health     Financial Resource Strain: Not on file   Food Insecurity: Not on file   Transportation Needs: Not on file   Physical Activity: Not on file   Stress: Not on file   Social Connections: Not on file   Intimate Partner Violence: Not on file   Housing Stability: Not on file       10 point Review of Systems is negative except for right ankle pain which is noted in HPI.     Pulse 80   LMP 02/15/2012   SpO2 98%     BMI= There is no height or weight on file to calculate BMI.    OBJECTIVE:  General appearance: Patient is alert and fully cooperative with history & exam.  No sign of distress is noted during the visit.    Vascular: Dorsalis pedis and posterior tibial pulses are palpable. There is pedal hair growth right.  CFT < 3 sec from anterior tibial surface to  distal digits right. There is no appreciable edema noted.  Dermatologic: Turgor and texture are within normal limits. No coloration or temperature changes. No primary or secondary lesions noted.  Neurologic: All epicritic and proprioceptive sensations are grossly intact right.  Musculoskeletal: Pain anterior right ankle.     Imaging:     XR Foot Right G/E 3 Views    Result Date: 9/13/2022  EXAM: XR FOOT RIGHT G/E 3 VIEWS, XR ANKLE RIGHT G/E 3 VIEWS LOCATION: [facility] DATE/TIME: 9/13/2022 3:51 PM INDICATION:  Ankle swelling, right COMPARISON: None.     IMPRESSION: No acute fracture. Small chronic avulsion fracture of the tip of the lateral malleolus. Old healed fracture deformity of the distal fibular diaphysis. Moderate soft tissue swelling along the lateral aspect of the ankle. Small accessory navicular. Mild hallux valgus. Advanced degenerative changes at the tibiotalar joint.    XR Ankle Right G/E 3 Views    Result Date: 9/13/2022  EXAM: XR FOOT RIGHT G/E 3 VIEWS, XR ANKLE RIGHT G/E 3 VIEWS LOCATION: [facility] DATE/TIME: 9/13/2022 3:51 PM INDICATION:  Ankle swelling, right COMPARISON: None.     IMPRESSION: No acute fracture. Small chronic avulsion fracture of the tip of the lateral malleolus. Old healed fracture deformity of the distal fibular diaphysis. Moderate soft tissue swelling along the lateral aspect of the ankle. Small accessory navicular. Mild hallux valgus. Advanced degenerative changes at the tibiotalar joint.

## 2022-10-10 NOTE — PATIENT INSTRUCTIONS
Naproxen and naproxen sodium oral immediate-release tablets    Brand Names: Aflaxen, Aleve, Aleve Arthritis, All Day Relief, Anaprox, Anaprox DS, Naprosyn   What is this medicine?  NAPROXEN (na PROX en) is a non-steroidal anti-inflammatory drug (NSAID). It is used to reduce swelling and to treat pain. This medicine may be used for dental pain, headache, or painful monthly periods. It is also used for painful joint and muscular problems such as arthritis, tendinitis, bursitis, and gout.  How should I use this medicine?  Take this medicine by mouth with a glass of water. Follow the directions on the prescription label. Take it with food if your stomach gets upset. Try to not lie down for at least 10 minutes after you take it. Take your medicine at regular intervals. Do not take your medicine more often than directed. Long-term, continuous use may increase the risk of heart attack or stroke.  A special MedGuide will be given to you by the pharmacist with each prescription and refill. Be sure to read this information carefully each time.  Talk to your pediatrician regarding the use of this medicine in children. Special care may be needed.  What side effects may I notice from receiving this medicine?  Side effects that you should report to your doctor or health care professional as soon as possible:  black or bloody stools, blood in the urine or vomit  blurred vision  chest pain  difficulty breathing or wheezing  nausea or vomiting  severe stomach pain  skin rash, skin redness, blistering or peeling skin, hives, or itching  slurred speech or weakness on one side of the body  swelling of eyelids, throat, lips  unexplained weight gain or swelling  unusually weak or tired  yellowing of eyes or skin  Side effects that usually do not require medical attention (report to your doctor or health care professional if they continue or are bothersome):  constipation  headache  heartburn  What may interact with this  medicine?  alcohol  aspirin  cidofovir  diuretics  lithium  methotrexate  other drugs for inflammation like ketorolac or prednisone  pemetrexed  probenecid  warfarin  What if I miss a dose?  If you miss a dose, take it as soon as you can. If it is almost time for your next dose, take only that dose. Do not take double or extra doses.  Where should I keep my medicine?  Keep out of the reach of children.  Store at room temperature between 15 and 30 degrees C (59 and 86 degrees F). Keep container tightly closed. Throw away any unused medicine after the expiration date.  What should I tell my health care provider before I take this medicine?  They need to know if you have any of these conditions:  cigarette smoker  coronary artery bypass graft (CABG) surgery within the past 2 weeks  drink more than 3 alcohol-containing drinks a day  heart disease  high blood pressure  history of stomach bleeding  kidney disease  liver disease  lung or breathing disease, like asthma  an unusual or allergic reaction to naproxen, aspirin, other NSAIDs, other medicines, foods, dyes, or preservatives  pregnant or trying to get pregnant  breast-feeding  What should I watch for while using this medicine?  Tell your doctor or health care professional if your pain does not get better. Talk to your doctor before taking another medicine for pain. Do not treat yourself.  This medicine does not prevent heart attack or stroke. In fact, this medicine may increase the chance of a heart attack or stroke. The chance may increase with longer use of this medicine and in people who have heart disease. If you take aspirin to prevent heart attack or stroke, talk with your doctor or health care professional.  Do not take other medicines that contain aspirin, ibuprofen, or naproxen with this medicine. Side effects such as stomach upset, nausea, or ulcers may be more likely to occur. Many medicines available without a prescription should not be taken with this  medicine.  This medicine can cause ulcers and bleeding in the stomach and intestines at any time during treatment. Do not smoke cigarettes or drink alcohol. These increase irritation to your stomach and can make it more susceptible to damage from this medicine. Ulcers and bleeding can happen without warning symptoms and can cause death.  You may get drowsy or dizzy. Do not drive, use machinery, or do anything that needs mental alertness until you know how this medicine affects you. Do not stand or sit up quickly, especially if you are an older patient. This reduces the risk of dizzy or fainting spells.  This medicine can cause you to bleed more easily. Try to avoid damage to your teeth and gums when you brush or floss your teeth.    Medicine is given to help treat or prevent illness. But if you don t take it correctly, it might not help. It might even harm you. Your healthcare provider or pharmacist can help you learn the right way to take your medicine. Listed below are some tips to help you take medicine safely.  Safety tips  Have a routine for taking each medicine. Make it part of something you do each day, such as brushing your teeth or eating a meal.  When you go to the hospital or your healthcare provider s office, bring all your current medicines in their original boxes or bottles. If you can t do that, bring an up-to-date list of your medicines.  Don't stop taking a prescription medicine unless your healthcare provider tells you to. Doing so could make your condition worse.  Don't share medicines.  Let your healthcare provider and pharmacist know of any allergies you have.  Taking prescription medicines with alcohol, street drugs, herbs, supplements, or even some over-the-counter medicines can be harmful. Talk to your healthcare provider or pharmacist before using any of these things while taking a prescription medicine.  When filling your prescriptions, try using the same pharmacy for all your medicines. If  that isn't possible, let each pharmacist know what medicines you are already taking.  Keep medicines out of the reach of children and pets. Store medicines in a cool, dry, dark place -- not in the bathroom or in the kitchen near moisture or heat.  Don't use medicine that has  or that doesn t look or smell right. Call your pharmacist for instructions on how to dispose of your medicines or where you can take them for safe disposal.  Medicine that comes in a container for a single dose should be used only 1 time. If you use the container a second time, it may have germs in it that can cause illness. These illnesses include hepatitis B and C. They also include infections of the brain or spinal cord (meningitis and epidural abscess).  WHAT YOU NEED TO KNOW:    What is a walking boot?  A walking boot is a type of medical shoe used to protect the foot and ankle after an injury or surgery. The boot can be used for broken bones, tendon injuries, severe sprains, or shin splints. A walking boot helps keep the foot stable so it can heal. It can keep your weight off an area, such as your toe, as it heals. Most boots have between 2 and 5 adjustable straps and go mid-way up your calf.              How do I put on the walking boot?  You may want to wear a large sock.  Sit down and place your heel all the way to the back of the boot.  Wrap the soft liner around your foot and leg.  Place the front piece over the liner.  Start to fasten the straps closest to your toes then move up your leg.  Tighten the straps so they are snug but not too tight. The boot should limit movement but not cut off your blood flow.  If your boot has one or more air chambers, pump them up as directed by your healthcare provider.  Stand up and take a few steps to practice walking.    What else do I need to know?  Check your foot and toes often. Check your foot and toes for redness and swelling. If your toes are red, swollen, numb, or tingly, loosen your  straps or deflate the air chamber. Over time, the swelling from the injury or surgery will decrease. When this happens, you may need to tighten the straps.  Be careful when you walk on wet surfaces. The boot may be slippery.  Follow the instructions to wash the liner. Remove the liner and wash it by hand in cold water with a mild detergent. Do not use a washing machine or dryer. Place the liner flat to dry. Wash the plastic parts with a damp cloth and mild soap.  Ask about removing the boot to bathe or for motion exercises. You may need to leave the boot on when you bathe. Cover it with a plastic bag and tape the bag closed around your leg.

## 2022-10-15 ENCOUNTER — HEALTH MAINTENANCE LETTER (OUTPATIENT)
Age: 62
End: 2022-10-15

## 2022-10-21 NOTE — PROGRESS NOTES
[facility] H&P NOTE:  Date: Oct 25, 2022    Patient Name: Kendra Vaca  MRN: [id number]    CHIEF COMPLAINT: ganglion cyst, right thumb    Dominant Hand: right  Occupation: cleans [employer]      HPI:  Ms. Kendra Vaca is a 62 year old female right hand dominant who presents with ganglion cyst, right thumb. Referred by Dr. Crisostomo for a consult. She notes the cyst has been present for 3 months and fluctuates in size. The size of the cyst is worse after working. She notes intermittent redness and swelling. It is painful at times. She has history of cyst excision in right wrist.       PAST MEDICAL HISTORY:  Past Medical History:   Diagnosis Date     Abnormal gait 10/30/2014     Ankle fracture 1/16/2015     Ankle fracture 1/16/2015     Arthritis 1/16/2015     Bruises easily 1/16/2015     Cervical dystonia 10/30/2014     Chronic neck pain 1/16/2015     Disturbance of skin sensation 1/16/2015     Hepatitis 1966    ? CONTACT HEP FROM DRINKING OUT OF UNCLES CUP     History of alcohol use 10/29/2014     History of biliary T-tube placement 1/12/2015    On MRI performed on 07/13/2014, she was found to have a C3-C4 large posterior disk herniation, slightly more on the right than on the left, with severe spinal canal stenosis and cord compression and extensive edema in the spinal cord from the upper C3 through mid C5.   MRI CERVICAL SPINE WITHOUT CONTRAST 7/13/2014 6:48 PM  HISTORY: Neck injury. Right neck and arm pain. Bilateral back pain. Tinglin     History of MRI of brain and brain stem 1/12/2015    MR BRAIN W/O & W CONTRAST 10/31/2014 10:32 AM History: Abnormal involuntary movements Comparison: None. Technique: Sagittal MPRAGE T1-weighted images with axial and coronal reconstructions and axial T1- and T2-weighted, turboFLAIR with fat saturation and susceptibility-weighted images were obtained without intravenous contrast. Following intravenous gadolinium-based contrast  administration, axial      History of MRI of neck 1/12/2015    On MRI performed on 07/13/2014, she was found to have a C3-C4 large posterior disk herniation, slightly more on the right than on the left, with severe spinal canal stenosis and cord compression and extensive edema in the spinal cord from the upper C3 through mid C5.      Insomnia 1/16/2015     Leg swelling 1/16/2015     Peripheral neuropathy 10/30/2014     Skin rash 1/16/2015     Tremor 10/29/2014    tremors that were initially worked up in 2009 by Neurology and attributed to alcohol.      Tremors     ?NERVE DAMAGE FROM NECK SURGERY     Urinary incontinence 1/16/2015     Urinary urgency 1/16/2015       PAST SURGICAL HISTORY:  Past Surgical History:   Procedure Laterality Date     ------------OTHER-------------      left-sided ACDF 20 yrs ago     APPENDECTOMY  17 yrs of age     BIOPSY BREAST Right 2012     BIOPSY BREAST NEEDLE LOCALIZATION  3/21/2012    Procedure:BIOPSY BREAST NEEDLE LOCALIZATION; Right Breast Wire Localized Excisional Biopsy; Surgeon:CARMEN OSCAR; Location:[facility] OR     ENT SURGERY  17 yrs of age    TONSILECTOMY     GYN SURGERY  25YRS AGO    BTL     ORTHOPEDIC SURGERY      ANKLE FRACTURE LT     ORTHOPEDIC SURGERY  15 yrs ago    NECK FX-TITANIUM PLATE-C5 C6     SOFT TISSUE SURGERY      RT HAND CYST REMOVED       MEDICATIONS:  Current Outpatient Medications   Medication     acetaminophen (TYLENOL) 500 MG tablet     azelastine (OPTIVAR) 0.05 % ophthalmic solution     azelastine (OPTIVAR) 0.05 % SOLN     EPINEPHrine (EPIPEN JR) 0.15 MG/0.3ML injection     ibuprofen (ADVIL/MOTRIN) 800 MG tablet     ketoconazole (NIZORAL) 2 % external cream     naproxen (NAPROSYN) 500 MG tablet     penciclovir (DENAVIR) 1 % external cream     Current Facility-Administered Medications   Medication     botulinum toxin type A (BOTOX) 100 units injection 1,200 Units     botulinum toxin type A (BOTOX) 100 units injection 300 Units       ALLERGIES:     Allergies    Allergen Reactions     Ciprofloxacin Anaphylaxis and Other (See Comments)     Throat swelling       Morphine Sulfate Hives     Seafood Hives     Shellfish-Derived Products Anaphylaxis     Latex      CONTACT RASH WITH LATEX PRODUCTS IN THE HEAT OF SUMMER       FAMILY HISTORY:  No pertinent family history    SOCIAL HISTORY:  Social History     Tobacco Use     Smoking status: Every Day     Packs/day: 0.50     Years: 35.00     Pack years: 17.50     Types: Cigarettes     Smokeless tobacco: Never   Substance Use Topics     Alcohol use: Yes     Comment: 4-7 DRINKS/WEEK     Drug use: No     Comment: in the past        The patient's past medical, family, and social history was reviewed and confirmed.    REVIEW OF SYMPTOMS:      General: Negative   Eyes: Negative   Ear, Nose and Throat: Negative   Respiratory: Negative   Cardiovascular: Negative   Gastrointestinal: Negative   Genito-urinary: Negative   Musculoskeletal: Negative  Neurological: Negative   Psychological: Negative  HEME: Negative   ENDO: Negative   SKIN: Negative    VITALS:  There were no vitals filed for this visit.    EXAM:  General: NAD, A&Ox3  HEENT: NC/AT  CV: RRR by peripheral pulse  Pulmonary: Non-labored breathing on RA  RUE:  Mucous cyst over the dorsal central aspect of the thumb IP joint  Minimally tender to palpation  Skin intact  5 out of 5 EPL, FPL, hand intrinsics  Sensation intact to light touch median, radial, ulnar nerve distributions  Warm well-perfused, capillary refill less than 2 seconds      IMAGING:    X-rays of the right thumb demonstrates joint asymmetry, degenerative changes, and dorsal osteophytes of the thumb IP joint    I have personally reviewed the above images and labs.       IMPRESSION AND RECOMMENDATIONS:  Ms. Kendra Vaca is a 62 year old female right hand dominant with right thumb IP arthritis and digital mucous cyst.    Discussed the diagnosis, natural history and treatment options for digital mucous cyst.  Given  that it is only present for 3 months, I have recommended continued observation as the majority of cyst will resolve spontaneously.    Given that her symptoms are aggravated by work and activity, I have provided her with an oval 8 splint to maintain the thumb IP joint in extension.  She was happy with this noted her thumb felt immediately better wearing the splint.    She will follow-up as needed. If cyst does not resolve, consider excision in the future.      Hever Hanson MD    Hand, Upper Extremity & Microvascular Surgery  Department of Orthopaedic Surgery  [facility]

## 2022-10-25 ENCOUNTER — ANCILLARY PROCEDURE (OUTPATIENT)
Dept: GENERAL RADIOLOGY | Facility: CLINIC | Age: 62
End: 2022-10-25
Attending: STUDENT IN AN ORGANIZED HEALTH CARE EDUCATION/TRAINING PROGRAM
Payer: MEDICARE

## 2022-10-25 ENCOUNTER — OFFICE VISIT (OUTPATIENT)
Dept: ORTHOPEDICS | Facility: CLINIC | Age: 62
End: 2022-10-25
Payer: MEDICARE

## 2022-10-25 VITALS — BODY MASS INDEX: 27.32 KG/M2 | HEIGHT: 66 IN | WEIGHT: 170 LBS

## 2022-10-25 DIAGNOSIS — M79.644 PAIN OF RIGHT THUMB: ICD-10-CM

## 2022-10-25 DIAGNOSIS — M79.644 PAIN OF RIGHT THUMB: Primary | ICD-10-CM

## 2022-10-25 PROCEDURE — 99203 OFFICE O/P NEW LOW 30 MIN: CPT | Performed by: STUDENT IN AN ORGANIZED HEALTH CARE EDUCATION/TRAINING PROGRAM

## 2022-10-25 PROCEDURE — 73140 X-RAY EXAM OF FINGER(S): CPT | Mod: TC | Performed by: RADIOLOGY

## 2022-10-25 ASSESSMENT — PAIN SCALES - GENERAL: PAINLEVEL: MODERATE PAIN (4)

## 2022-10-25 NOTE — LETTER
10/25/2022         RE: Kendra Vaca  [address] [facility] [address]        Dear Colleague,    Thank you for referring your patient, Kendra Vaca, to the [facility] [place]. Please see a copy of my visit note below.    Orthopaedic Surgery [facility]rem[facility] Clinic H&P NOTE:  Date: Oct 25, 2022    Patient Name: Kendra Vaca  MRN: [id number]    CHIEF COMPLAINT: ganglion cyst, right thumb    Dominant Hand: right  Occupation: cleans [employer]      HPI:  Ms. Kendra Vaca is a 62 year old female right hand dominant who presents with ganglion cyst, right thumb. Referred by Dr. Crisostomo for a consult. She notes the cyst has been present for 3 months and fluctuates in size. The size of the cyst is worse after working. She notes intermittent redness and swelling. It is painful at times. She has history of cyst excision in right wrist.       PAST MEDICAL HISTORY:  Past Medical History:   Diagnosis Date     Abnormal gait 10/30/2014     Ankle fracture 1/16/2015     Ankle fracture 1/16/2015     Arthritis 1/16/2015     Bruises easily 1/16/2015     Cervical dystonia 10/30/2014     Chronic neck pain 1/16/2015     Disturbance of skin sensation 1/16/2015     Hepatitis 1966    ? CONTACT HEP FROM DRINKING OUT OF UNCLES CUP     History of alcohol use 10/29/2014     History of biliary T-tube placement 1/12/2015    On MRI performed on 07/13/2014, she was found to have a C3-C4 large posterior disk herniation, slightly more on the right than on the left, with severe spinal canal stenosis and cord compression and extensive edema in the spinal cord from the upper C3 through mid C5.   MRI CERVICAL SPINE WITHOUT CONTRAST 7/13/2014 6:48 PM  HISTORY: Neck injury. Right neck and arm pain. Bilateral back pain. Tinglin     History of MRI of brain and brain stem 1/12/2015    MR BRAIN W/O & W CONTRAST 10/31/2014 10:32 AM History: Abnormal involuntary movements Comparison: None.  Technique: Sagittal MPRAGE T1-weighted images with axial and coronal reconstructions and axial T1- and T2-weighted, turboFLAIR with fat saturation and susceptibility-weighted images were obtained without intravenous contrast. Following intravenous gadolinium-based contrast administration, axial      History of MRI of neck 1/12/2015    On MRI performed on 07/13/2014, she was found to have a C3-C4 large posterior disk herniation, slightly more on the right than on the left, with severe spinal canal stenosis and cord compression and extensive edema in the spinal cord from the upper C3 through mid C5.      Insomnia 1/16/2015     Leg swelling 1/16/2015     Peripheral neuropathy 10/30/2014     Skin rash 1/16/2015     Tremor 10/29/2014    tremors that were initially worked up in 2009 by Neurology and attributed to alcohol.      Tremors     ?NERVE DAMAGE FROM NECK SURGERY     Urinary incontinence 1/16/2015     Urinary urgency 1/16/2015       PAST SURGICAL HISTORY:  Past Surgical History:   Procedure Laterality Date     ------------OTHER-------------      left-sided ACDF 20 yrs ago     APPENDECTOMY  17 yrs of age     BIOPSY BREAST Right 2012     BIOPSY BREAST NEEDLE LOCALIZATION  3/21/2012    Procedure:BIOPSY BREAST NEEDLE LOCALIZATION; Right Breast Wire Localized Excisional Biopsy; Surgeon:CARMEN OSCAR; Location:[facility] OR     ENT SURGERY  17 yrs of age    TONSILECTOMY     GYN SURGERY  25YRS AGO    BTL     ORTHOPEDIC SURGERY      ANKLE FRACTURE LT     ORTHOPEDIC SURGERY  15 yrs ago    NECK FX-TITANIUM PLATE-C5 C6     SOFT TISSUE SURGERY      RT HAND CYST REMOVED       MEDICATIONS:  Current Outpatient Medications   Medication     acetaminophen (TYLENOL) 500 MG tablet     azelastine (OPTIVAR) 0.05 % ophthalmic solution     azelastine (OPTIVAR) 0.05 % SOLN     EPINEPHrine (EPIPEN JR) 0.15 MG/0.3ML injection     ibuprofen (ADVIL/MOTRIN) 800 MG tablet     ketoconazole (NIZORAL) 2 % external cream     naproxen (NAPROSYN) 500 MG  tablet     penciclovir (DENAVIR) 1 % external cream     Current Facility-Administered Medications   Medication     botulinum toxin type A (BOTOX) 100 units injection 1,200 Units     botulinum toxin type A (BOTOX) 100 units injection 300 Units       ALLERGIES:     Allergies   Allergen Reactions     Ciprofloxacin Anaphylaxis and Other (See Comments)     Throat swelling       Morphine Sulfate Hives     Seafood Hives     Shellfish-Derived Products Anaphylaxis     Latex      CONTACT RASH WITH LATEX PRODUCTS IN THE HEAT OF SUMMER       FAMILY HISTORY:  No pertinent family history    SOCIAL HISTORY:  Social History     Tobacco Use     Smoking status: Every Day     Packs/day: 0.50     Years: 35.00     Pack years: 17.50     Types: Cigarettes     Smokeless tobacco: Never   Substance Use Topics     Alcohol use: Yes     Comment: 4-7 DRINKS/WEEK     Drug use: No     Comment: in the past        The patient's past medical, family, and social history was reviewed and confirmed.    REVIEW OF SYMPTOMS:      General: Negative   Eyes: Negative   Ear, Nose and Throat: Negative   Respiratory: Negative   Cardiovascular: Negative   Gastrointestinal: Negative   Genito-urinary: Negative   Musculoskeletal: Negative  Neurological: Negative   Psychological: Negative  HEME: Negative   ENDO: Negative   SKIN: Negative    VITALS:  There were no vitals filed for this visit.    EXAM:  General: NAD, A&Ox3  HEENT: NC/AT  CV: RRR by peripheral pulse  Pulmonary: Non-labored breathing on RA  RUE:  Mucous cyst over the dorsal central aspect of the thumb IP joint  Minimally tender to palpation  Skin intact  5 out of 5 EPL, FPL, hand intrinsics  Sensation intact to light touch median, radial, ulnar nerve distributions  Warm well-perfused, capillary refill less than 2 seconds      IMAGING:    X-rays of the right thumb demonstrates joint asymmetry, degenerative changes, and dorsal osteophytes of the thumb IP joint    I have personally reviewed the above images  and labs.       IMPRESSION AND RECOMMENDATIONS:  Ms. Kendra Vaca is a 62 year old female right hand dominant with right thumb IP arthritis and digital mucous cyst.    Discussed the diagnosis, natural history and treatment options for digital mucous cyst.  Given that it is only present for 3 months, I have recommended continued observation as the majority of cyst will resolve spontaneously.    Given that her symptoms are aggravated by work and activity, I have provided her with an oval 8 splint to maintain the thumb IP joint in extension.  She was happy with this noted her thumb felt immediately better wearing the splint.    She will follow-up as needed. If cyst does not resolve, consider excision in the future.      Hever Hanson MD    Hand, Upper Extremity & Microvascular Surgery  Department of Orthopaedic Surgery  [facility]        Again, thank you for allowing me to participate in the care of your patient.        Sincerely,        Hever Hanson MD

## 2022-11-04 ENCOUNTER — TELEPHONE (OUTPATIENT)
Dept: MAMMOGRAPHY | Facility: CLINIC | Age: 62
End: 2022-11-04

## 2022-11-04 NOTE — TELEPHONE ENCOUNTER
Patient Quality Outreach    Patient is due for the following:   Breast Cancer Screening - Mammogram    Next Steps:   Schedule a office visit for mammogram    Type of outreach:    Phone, left message for patient/parent to call back.    Next Steps:  Reach out within 90 days via Phone.    Max number of attempts reached: No. Will try again in 90 days if patient still on fail list.    Questions for provider review:    None     JASIEL Ceja  Chart routed to Care Team.

## 2022-11-10 ENCOUNTER — OFFICE VISIT (OUTPATIENT)
Dept: PHYSICAL MEDICINE AND REHAB | Facility: CLINIC | Age: 62
End: 2022-11-10
Payer: MEDICARE

## 2022-11-10 VITALS — SYSTOLIC BLOOD PRESSURE: 139 MMHG | HEART RATE: 89 BPM | DIASTOLIC BLOOD PRESSURE: 70 MMHG

## 2022-11-10 DIAGNOSIS — G24.1 GENETIC TORSION DYSTONIA: ICD-10-CM

## 2022-11-10 DIAGNOSIS — G24.3 SPASMODIC TORTICOLLIS: Primary | ICD-10-CM

## 2022-11-10 PROCEDURE — 95874 GUIDE NERV DESTR NEEDLE EMG: CPT | Performed by: PHYSICAL MEDICINE & REHABILITATION

## 2022-11-10 PROCEDURE — 64616 CHEMODENERV MUSC NECK DYSTON: CPT | Mod: 50 | Performed by: PHYSICAL MEDICINE & REHABILITATION

## 2022-11-10 PROCEDURE — 64643 CHEMODENERV 1 EXTREM 1-4 EA: CPT | Mod: LT | Performed by: PHYSICAL MEDICINE & REHABILITATION

## 2022-11-10 PROCEDURE — 64642 CHEMODENERV 1 EXTREMITY 1-4: CPT | Mod: RT | Performed by: PHYSICAL MEDICINE & REHABILITATION

## 2022-11-10 NOTE — LETTER
11/10/2022         RE: Kendra Vaca  [address] [facility] [address]        Dear Colleague,    Thank you for referring your patient, Kednra Vaca, to the [facility]. Please see a copy of my visit note below.      [facility]    PM&R CLINIC NOTE  BOTULINUM TOXIN PROCEDURE      HPI  Chief Complaint   Patient presents with     Botox     Kendra Vaca is a 62 year old female with a history of involuntary spasms of the neck and shoulder muscles with tremor who presents to clinic for botulinum toxin injections for management of cervical dystonia.     SINCE LAST VISIT  Kendra Vaca was last seen here in clinic on 8/11/2022, at which time she received 200 units of Botox.    Patient denies new medical diagnoses, illnesses, hospitalizations, emergency room visits, and injuries since the previous injection with botulinum neurotoxin. She underwent a corticosteroid injection into her right ankle for osteoarthritis pain relief.     RESPONSE TO PREVIOUS TREATMENT    Side effects: No problems reported    Pain Improvement: Yes.  Percent Improvement: 90 %    Duration of Benefit:  10 weeks and followed by a gradual reduction in benefit.    Dystonia Improvement: Yes.  Percent Improvement: 90 %    Duration of Benefit:  10 weeks and followed by a gradual reduction in benefit. She has noticed a return of tremors and dystonic pulling over the last week.        PHYSICAL EXAM  VS: /70 (BP Location: Right arm, Patient Position: Sitting, Cuff Size: Adult Regular)   Pulse 89   LMP 02/15/2012    GEN: Pleasant and cooperative, in no acute distress  HEENT: No facial asymmetry  HEAD, NECK AND TRUNK PATTERN:   Continuous multidirectional tremor  Right rotation  Left side bending      ALLERGIES  Allergies   Allergen Reactions     Ciprofloxacin Anaphylaxis and Other (See Comments)     Throat swelling       Morphine Sulfate Hives     Seafood Hives      Shellfish-Derived Products Anaphylaxis     Latex      CONTACT RASH WITH LATEX PRODUCTS IN THE HEAT OF SUMMER       CURRENT MEDICATIONS    Current Outpatient Medications:      acetaminophen (TYLENOL) 500 MG tablet, Take 500-1,000 mg by mouth every 6 hours as needed for mild pain (can take 4 per day), Disp: , Rfl:      azelastine (OPTIVAR) 0.05 % SOLN, Place 1 drop into both eyes as needed , Disp: , Rfl:      EPINEPHrine (EPIPEN JR) 0.15 MG/0.3ML injection, Inject 0.15 mg into the muscle as needed for anaphylaxis, Disp: , Rfl:      ibuprofen (ADVIL/MOTRIN) 800 MG tablet, Take 800 mg by mouth, Disp: , Rfl:      ketoconazole (NIZORAL) 2 % external cream, , Disp: , Rfl:      naproxen (NAPROSYN) 500 MG tablet, Take 1 tablet (500 mg) by mouth 2 times daily (with meals), Disp: 28 tablet, Rfl: 0     penciclovir (DENAVIR) 1 % external cream, Apply 1 g topically every 2 hours, Disp: , Rfl:      azelastine (OPTIVAR) 0.05 % ophthalmic solution, Apply 1 drop to eye, Disp: , Rfl:     Current Facility-Administered Medications:      botulinum toxin type A (BOTOX) 100 units injection 1,200 Units, 1,200 Units, Intramuscular, Q90 Days, Virginia Rosas MD     botulinum toxin type A (BOTOX) 100 units injection 300 Units, 300 Units, Intramuscular, Q90 Days, Virginia Rosas MD, 200 Units at 22 1734       BOTULINUM NEUROTOXIN INJECTION PROCEDURES    VERIFICATION OF PATIENT IDENTIFICATION AND PROCEDURE     Initials   Patient Name SES   Patient  SES   Procedure Verified by: JASMYN     Prior to the start of the procedure and with procedural staff participation, I verbally confirmed the patient s identity using two indicators, relevant allergies, that the procedure was appropriate and matched the consent or emergent situation, and that the correct equipment/implants were available. Immediately prior to starting the procedure I conducted the Time Out with the procedural staff and re-confirmed the patient s name,  procedure, and site/side. 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  She is here today for reinjection with Botox.    GOAL OF PROCEDURE  The goal of this procedure is to increase active range of motion, improve volitional motor control, decrease pain  and enhance functional independence.      TOTAL DOSE ADMINISTERED  Dose Administered:  200 units  Botox (Botulinum Toxin Type A)       1:1 Dilution   Unavoidable Drug Waste: No  Diluent Used:  Preservative Free Normal Saline  Total Volume of Diluent Used:  2 ml  Lot # [id number] with Expiration Date:  12/2024  NDC #: Botox 100u ([address]-48)      CONSENT  The risks, benefits, and treatment options were discussed with Kendra Vaca and she agreed to proceed.    Written consent was obtained by [facility].     EQUIPMENT USED  Needle-37mm stimulating/recording  EMG/NCS Machine    SKIN PREPARATION  Skin preparation was performed using an alcohol wipe.    GUIDANCE DESCRIPTION  Electro-myographic guidance was necessary throughout the procedure to accurately identify all areas of dystonic muscles while avoiding injection of non-dystonic muscles, neighboring nerves and nearby vascular structures.       AREA/MUSCLE INJECTED:  200 UNITS BOTOX = TOTAL DOSE, 1:1 DILUTION    1. HEAD & NECK MUSCLES: 200 UNITS BOTOX = TOTAL DOSE     Right Mid-Trapezius - 25 units of Botox at 1 site/s.   Left Mid-Trapezius - 15 units of Botox at 1 site/s.      Right lateral Trapezius - 30 units of Botox at 1 site.  Left lateral Trapezius - 10 units of Botox at 1 site.      Right Splenius Cervicis - 30 units of Botox at 1 site/s.   Left Splenius  Cervicis - 20 units of Botox at 1 site/s.      Right Levator Scapulae - 20 units of Botox at 1 site/s (shoulder muscles).   Left Levator Scapulae - 20 units of Botox at 1 site/s (shoulder muscles).                  Right Sternocleidomastoid - 20 units of Botox at 1 site/s.               Left Sternocleidomastoid - 10 units of Botox at 1 site/s.       RESPONSE TO PROCEDURE  Kendra Vaca tolerated the procedure well and there were no immediate complications. She was allowed to recover for an appropriate period of time and was discharged home in stable condition.    ASSESSMENT AND PLAN   1. Botulinum toxin injections: No changes made to Botox dose or distribution today. Patient will continue to monitor response and report at next appointment.   2. Referrals: None.   3. Follow up: Kendra Vaca was rescheduled for the next series of injections in 12 weeks, at which time we will evaluate response to today's injections. she may call the clinic prior with any questions or concerns prior to the next appointment.       Again, thank you for allowing me to participate in the care of your patient.        Sincerely,        Virginia Rosas MD

## 2022-11-10 NOTE — PROGRESS NOTES
[facility]    PM&R CLINIC NOTE  BOTULINUM TOXIN PROCEDURE      HPI  Chief Complaint   Patient presents with     Botox     Kendra Vaca is a 62 year old female with a history of involuntary spasms of the neck and shoulder muscles with tremor who presents to clinic for botulinum toxin injections for management of cervical dystonia.     SINCE LAST VISIT  Kendra Vaca was last seen here in clinic on 8/11/2022, at which time she received 200 units of Botox.    Patient denies new medical diagnoses, illnesses, hospitalizations, emergency room visits, and injuries since the previous injection with botulinum neurotoxin. She underwent a corticosteroid injection into her right ankle for osteoarthritis pain relief.     RESPONSE TO PREVIOUS TREATMENT    Side effects: No problems reported    Pain Improvement: Yes.  Percent Improvement: 90 %    Duration of Benefit:  10 weeks and followed by a gradual reduction in benefit.    Dystonia Improvement: Yes.  Percent Improvement: 90 %    Duration of Benefit:  10 weeks and followed by a gradual reduction in benefit. She has noticed a return of tremors and dystonic pulling over the last week.        PHYSICAL EXAM  VS: /70 (BP Location: Right arm, Patient Position: Sitting, Cuff Size: Adult Regular)   Pulse 89   LMP 02/15/2012    GEN: Pleasant and cooperative, in no acute distress  HEENT: No facial asymmetry  HEAD, NECK AND TRUNK PATTERN:   Continuous multidirectional tremor  Right rotation  Left side bending      ALLERGIES  Allergies   Allergen Reactions     Ciprofloxacin Anaphylaxis and Other (See Comments)     Throat swelling       Morphine Sulfate Hives     Seafood Hives     Shellfish-Derived Products Anaphylaxis     Latex      CONTACT RASH WITH LATEX PRODUCTS IN THE HEAT OF SUMMER       CURRENT MEDICATIONS    Current Outpatient Medications:      acetaminophen (TYLENOL) 500 MG tablet, Take 500-1,000 mg by mouth every 6 hours as needed  for mild pain (can take 4 per day), Disp: , Rfl:      azelastine (OPTIVAR) 0.05 % SOLN, Place 1 drop into both eyes as needed , Disp: , Rfl:      EPINEPHrine (EPIPEN JR) 0.15 MG/0.3ML injection, Inject 0.15 mg into the muscle as needed for anaphylaxis, Disp: , Rfl:      ibuprofen (ADVIL/MOTRIN) 800 MG tablet, Take 800 mg by mouth, Disp: , Rfl:      ketoconazole (NIZORAL) 2 % external cream, , Disp: , Rfl:      naproxen (NAPROSYN) 500 MG tablet, Take 1 tablet (500 mg) by mouth 2 times daily (with meals), Disp: 28 tablet, Rfl: 0     penciclovir (DENAVIR) 1 % external cream, Apply 1 g topically every 2 hours, Disp: , Rfl:      azelastine (OPTIVAR) 0.05 % ophthalmic solution, Apply 1 drop to eye, Disp: , Rfl:     Current Facility-Administered Medications:      botulinum toxin type A (BOTOX) 100 units injection 1,200 Units, 1,200 Units, Intramuscular, Q90 Days, Virginia Rosas MD     botulinum toxin type A (BOTOX) 100 units injection 300 Units, 300 Units, Intramuscular, Q90 Days, Virginia Rosas MD, 200 Units at 22 1734       BOTULINUM NEUROTOXIN INJECTION PROCEDURES    VERIFICATION OF PATIENT IDENTIFICATION AND PROCEDURE     Initials   Patient Name SES   Patient  SES   Procedure Verified by: SES     Prior to the start of the procedure and with procedural staff participation, I verbally confirmed the patient s identity using two indicators, relevant allergies, that the procedure was appropriate and matched the consent or emergent situation, and that the correct equipment/implants were available. Immediately prior to starting the procedure I conducted the Time Out with the procedural staff and re-confirmed the patient s name, procedure, and site/side. (The Joint Commission universal protocol was followed.)  Yes    Sedation (Moderate or Deep): None    ABOVE ASSESSMENTS PERFORMED BY    Virginia Rosas MD      INDICATIONS FOR PROCEDURES  Kendra Vaca is a 62 year old patient with a  history of involuntary spasms of the neck and shoulder muscles with tremor who presents to clinic for botulinum toxin injections for management of cervical dystonia.  She is here today for reinjection with Botox.    GOAL OF PROCEDURE  The goal of this procedure is to increase active range of motion, improve volitional motor control, decrease pain  and enhance functional independence.      TOTAL DOSE ADMINISTERED  Dose Administered:  200 units  Botox (Botulinum Toxin Type A)       1:1 Dilution   Unavoidable Drug Waste: No  Diluent Used:  Preservative Free Normal Saline  Total Volume of Diluent Used:  2 ml  Lot # [id number] with Expiration Date:  12/2024  NDC #: Botox 100u ([address]-06)      CONSENT  The risks, benefits, and treatment options were discussed with Kendra Vaca and she agreed to proceed.    Written consent was obtained by [facility].     EQUIPMENT USED  Needle-37mm stimulating/recording  EMG/NCS Machine    SKIN PREPARATION  Skin preparation was performed using an alcohol wipe.    GUIDANCE DESCRIPTION  Electro-myographic guidance was necessary throughout the procedure to accurately identify all areas of dystonic muscles while avoiding injection of non-dystonic muscles, neighboring nerves and nearby vascular structures.       AREA/MUSCLE INJECTED:  200 UNITS BOTOX = TOTAL DOSE, 1:1 DILUTION    1. HEAD & NECK MUSCLES: 200 UNITS BOTOX = TOTAL DOSE     Right Mid-Trapezius - 25 units of Botox at 1 site/s.   Left Mid-Trapezius - 15 units of Botox at 1 site/s.      Right lateral Trapezius - 30 units of Botox at 1 site.  Left lateral Trapezius - 10 units of Botox at 1 site.      Right Splenius Cervicis - 30 units of Botox at 1 site/s.   Left Splenius Cervicis - 20 units of Botox at 1 site/s.      Right Levator Scapulae - 20 units of Botox at 1 site/s (shoulder muscles).   Left Levator Scapulae - 20 units of Botox at 1 site/s (shoulder muscles).                  Right Sternocleidomastoid - 20 units of Botox at 1  site/s.               Left Sternocleidomastoid - 10 units of Botox at 1 site/s.       RESPONSE TO PROCEDURE  Kendra Vaca tolerated the procedure well and there were no immediate complications. She was allowed to recover for an appropriate period of time and was discharged home in stable condition.    ASSESSMENT AND PLAN   1. Botulinum toxin injections: No changes made to Botox dose or distribution today. Patient will continue to monitor response and report at next appointment.   2. Referrals: None.   3. Follow up: Kendra Vaca was rescheduled for the next series of injections in 12 weeks, at which time we will evaluate response to today's injections. she may call the clinic prior with any questions or concerns prior to the next appointment.

## 2022-12-11 ENCOUNTER — APPOINTMENT (OUTPATIENT)
Dept: CT IMAGING | Facility: CLINIC | Age: 62
End: 2022-12-11
Attending: EMERGENCY MEDICINE
Payer: MEDICARE

## 2022-12-11 ENCOUNTER — HOSPITAL ENCOUNTER (EMERGENCY)
Facility: CLINIC | Age: 62
Discharge: HOME | End: 2022-12-11
Attending: EMERGENCY MEDICINE | Admitting: EMERGENCY MEDICINE
Payer: MEDICARE

## 2022-12-11 VITALS
DIASTOLIC BLOOD PRESSURE: 87 MMHG | BODY MASS INDEX: 27.86 KG/M2 | RESPIRATION RATE: 18 BRPM | HEART RATE: 76 BPM | SYSTOLIC BLOOD PRESSURE: 127 MMHG | WEIGHT: 170 LBS | OXYGEN SATURATION: 99 % | TEMPERATURE: 97.5 F

## 2022-12-11 DIAGNOSIS — S09.90XA CLOSED HEAD INJURY, INITIAL ENCOUNTER: ICD-10-CM

## 2022-12-11 DIAGNOSIS — S01.81XA FACIAL LACERATION, INITIAL ENCOUNTER: ICD-10-CM

## 2022-12-11 DIAGNOSIS — S80.01XA CONTUSION OF RIGHT KNEE, INITIAL ENCOUNTER: ICD-10-CM

## 2022-12-11 PROCEDURE — G1010 CDSM STANSON: HCPCS

## 2022-12-11 PROCEDURE — 90715 TDAP VACCINE 7 YRS/> IM: CPT | Performed by: EMERGENCY MEDICINE

## 2022-12-11 PROCEDURE — 90471 IMMUNIZATION ADMIN: CPT | Performed by: EMERGENCY MEDICINE

## 2022-12-11 PROCEDURE — 250N000013 HC RX MED GY IP 250 OP 250 PS 637: Performed by: EMERGENCY MEDICINE

## 2022-12-11 PROCEDURE — 12011 RPR F/E/E/N/L/M 2.5 CM/<: CPT

## 2022-12-11 PROCEDURE — 250N000011 HC RX IP 250 OP 636: Performed by: EMERGENCY MEDICINE

## 2022-12-11 PROCEDURE — 99285 EMERGENCY DEPT VISIT HI MDM: CPT | Mod: 25

## 2022-12-11 RX ORDER — ACETAMINOPHEN 500 MG
1000 TABLET ORAL ONCE
Status: COMPLETED | OUTPATIENT
Start: 2022-12-11 | End: 2022-12-11

## 2022-12-11 RX ADMIN — CLOSTRIDIUM TETANI TOXOID ANTIGEN (FORMALDEHYDE INACTIVATED), CORYNEBACTERIUM DIPHTHERIAE TOXOID ANTIGEN (FORMALDEHYDE INACTIVATED), BORDETELLA PERTUSSIS TOXOID ANTIGEN (GLUTARALDEHYDE INACTIVATED), BORDETELLA PERTUSSIS FILAMENTOUS HEMAGGLUTININ ANTIGEN (FORMALDEHYDE INACTIVATED), BORDETELLA PERTUSSIS PERTACTIN ANTIGEN, AND BORDETELLA PERTUSSIS FIMBRIAE 2/3 ANTIGEN 0.5 ML: 5; 2; 2.5; 5; 3; 5 INJECTION, SUSPENSION INTRAMUSCULAR at 22:28

## 2022-12-11 RX ADMIN — ACETAMINOPHEN 1000 MG: 500 TABLET ORAL at 22:28

## 2022-12-11 ASSESSMENT — ENCOUNTER SYMPTOMS
SHORTNESS OF BREATH: 0
ARTHRALGIAS: 1
VOMITING: 0
WOUND: 1
NAUSEA: 0
ABDOMINAL PAIN: 0
BACK PAIN: 0

## 2022-12-11 ASSESSMENT — ACTIVITIES OF DAILY LIVING (ADL)
ADLS_ACUITY_SCORE: 33
ADLS_ACUITY_SCORE: 33

## 2022-12-12 NOTE — ED PROVIDER NOTES
History   Chief Complaint:  Head Injury       The history is provided by the patient.      Kendra Vaca is a 62 year old female who presents with a head injury. At approximately 1830, she was in her garage when she tripped and fell. During the fall, she reports falling and hitting her head against a chainsaw.  She reports brief LOC though states the episode was witnessed by family.  No reported seizure activity. She has had a headache and right knee pain since then. Also, she has a laceration to her forehead and an abrasion to her right knee. She took 2 doses of ibuprofen prior to arrival. She is not on blood thinners. She endorses occasional alcohol use. She was initially seen at urgent care, but was sent to the ED for further assessment. She denies nausea, vomiting, chest pain, shortness of breath, abdominal pain, neck pain, focal weakness, paresthesias, hip pain, or back pain.     Review of Systems   Respiratory: Negative for shortness of breath.    Cardiovascular: Negative for chest pain.   Gastrointestinal: Negative for abdominal pain, nausea and vomiting.   Musculoskeletal: Positive for arthralgias (R knee). Negative for back pain.   Skin: Positive for wound (Laceration and abrasion).   Neurological: Positive for syncope.   All other systems reviewed and are negative.    Allergies:  Ciprofloxacin  Morphine Sulfate  Seafood  Shellfish-Derived Products  Latex    Medications:  Epipen   Naprosyn     Past Medical History:     Tremor  Cervical dystonia  Peripheral neuropathy  Insomnia  Chronic neck pain  Nicotine abuse  Spinal cord disease (H)  History of alcoholism (H)    Past Surgical History:    L-sided ACDF   Appendectomy   R sided breast biopsy   Tonsillectomy   BTL   L ankle fracture surgery   C5-6 titanium plate insertion   R hand cyst excision     Family History:    Mother - cancer   Son - tremor     Social History:  The patient presents to the ED alone via private vehicle   PCP: Dipak Dale  [place]   Hx of alcohol abuse and nicotine abuse     Physical Exam     Patient Vitals for the past 24 hrs:   BP Temp Pulse Resp SpO2 Weight   12/11/22 1958 127/87 97.5  F (36.4  C) 76 18 99 % 77.1 kg (170 lb)       Physical Exam  General: Alert.   Head:  The R. Frontal scalp is with 2.5cm partial thickness laceration, no bony/tendon/arterial involvement  Eyes:  Sclera white; Pupils are equal and round  ENT:    External ears normal.  No hemotympanum.      External nares normal.  No septal hematoma.    Neck:  No midline tenderness or pain with full ROM.  CV:  Rate as above with regular rhythm   No murmur   2/2 radial and dorsal pedal pulses  Resp:  Breath sounds clear and equal bilaterally    Non-labored, no retractions or accessory muscle use  GI:  Abdomen soft, non-tender, non-distended    No rebound tenderness or guarding  MSK:  No midline tenderness or bony step-off    No deformity    Moves all extremities equally and symmetrically; R. Knee abrasion, no bony tenderness  Skin:  No rash or lesions noted.  Neuro:   No apparent deficit.    Strength 5/5 x4.  Sensation intact x4.     GCS: 15  Psych:  Normal affect.        Emergency Department Course   Imaging:  Head CT w/o contrast  Final Result  IMPRESSION:  1.  No acute intracranial process.    Report per radiology    Procedures     Laceration Repair      LACERATION:  A simple clean 2.5 cm laceration.    LOCATION:  Forehead.    FUNCTION:  Distally sensation, circulation, motor and tendon function are intact.    ANESTHESIA:  Local using Lidocaine 1% with epinephrine total of 2 mLs.    PREPARATION:  Irrigation with Shur Clens.    DEBRIDEMENT:  no debridement.    CLOSURE:  Wound was closed with One Layer.  Skin closed with 5 5.0 Fast Absorbing Gut using interrupted sutures..    Emergency Department Course:    Reviewed:  I reviewed nursing notes, vitals and past medical history    Assessments:  2216 I obtained history and examined the patient as noted above.   2232 I  rechecked the patient and administered an anaesthetic.  2248 I rechecked the patient and a laceration repair was performed as outlined in the procedure note above. The patient tolerated well and there were no complications.    Interventions:  2228 Tdap 0.5 ml IM   2228 Tylenol 1 g PO    Disposition:  The patient was discharged to home.     Impression & Plan   Medical Decision Making:  Patient is a 62-year-old female presenting status post reported head injury.  She reports transient loss of consciousness.  She complains predominantly of headache and right knee pain on arrival.  She did undergo formal CT head which is fortunately without evidence of acute traumatic injury.  C-spine cleared.  She has no bony tenderness to her right knee and the remainder of her head to toe exam is without traumatic injury.  She does have a laceration that was repaired as noted herein.  She denies any prodromal symptoms and I do not feel emergent labs are warranted at this point in time.  Risks of scarring and infection discussed with the patient.  Wound precautions discussed as well as need for close outpatient follow-up.  I discussed with patient she is to be managed as sustaining a closed head injury.  Red flag symptoms reviewed as well as secondary impact syndrome.  Recommended Tylenol/Motrin for pain control.  All questions addressed.    Diagnosis:    ICD-10-CM    1. Closed head injury, initial encounter  S09.90XA       2. Contusion of right knee, initial encounter  S80.01XA       3. Facial laceration, initial encounter  S01.81XA           Scribe Disclosure:  I, Marin Schneider, am serving as a scribe at 10:24 PM on 12/11/2022 to document services personally performed by Marlene Chin DO based on my observations and the provider's statements to me.        Marlene Chin DO  12/12/22 0150

## 2022-12-12 NOTE — ED TRIAGE NOTES
Presents to ED from . Patient was walking in the garage and tripped and fell hitting the R side of forehead on a chainsaw that was sitting on the floor causing a laceration. Patient had a brief LOC  estimates was only for a few seconds, denies use of thinners. Also has abrasion to R knee. LET applied at  prior to transfer. Transferred to ED for head CT and treatment of wounds     Triage Assessment     Row Name 12/11/22 1959       Triage Assessment (Adult)    Airway WDL WDL       Respiratory WDL    Respiratory WDL WDL       Cardiac WDL    Cardiac WDL WDL

## 2022-12-12 NOTE — DISCHARGE INSTRUCTIONS
Discharge Instructions  Concussion    You were seen today for signs of a concussion.  The symptoms will vary, depending on the nature of your injury and your health. You may have: headache, confusion, nausea (feel sick to your stomach), vomiting (throwing up) and problems with memory, concentrating, or sleep. You may feel dizzy, irritable, and tired. Children and teens may need help from their parents, teachers, and coaches to watch for symptoms as they recover.    Generally, every Emergency Department visit should have a follow-up clinic visit with either a primary or a specialty clinic/provider. Please follow-up as instructed by your emergency provider today.     Return to the Emergency Department if:  Your headache gets worse or you start to have a really bad headache even with the recommended treatment plan.   You feel drowsier, have growing confusion, or slurred speech.   You keep repeating yourself.   You have strange behavior or are feeling more irritable.   You have a seizure.   You vomit (throw up) more than once.   You have trouble walking.   You have weakness or numbness.  Your neck pain gets worse.   You have a loss of consciousness.   You have blood for fluid coming from your ears or nose.   You have new symptoms or anything that worries you.     Home Care:  Get lots of rest and get enough sleep at night. Take daytime naps or rest if you feel tired.   Limit physical activity and  thinking  activities. These can make symptoms worse.   Physical activities include gym, sports, weight training, running, exercise, and heavy lifting.   Thinking activities include homework, class work, job-related work, and screen time (phone, computer, tablet, TV, and video games).   Stick to a healthy diet and drink lots of fluids. Avoid alcohol.  As symptoms improve, you may slowly return to your daily activities. If symptoms get worse or return, reduce your activity.   Know that it is normal to feel sad or frustrated when  you do not feel right and are less active.     Going Back to Work:  Your care team will tell you when you are ready to return to work.    Limit the amount of work you do soon after your injury. This may speed healing. Take breaks if your symptoms get worse. You should also reduce your physical activity as well as activities that require a lot of thinking until you see your doctor. You may need shorter work days and a lighter workload.  Avoid heavy lifting, working with machinery, driving and working at heights until your symptoms are gone or you are cleared by a provider.    Going Back to School:  If you are still having symptoms, you may need extra help at school.  Tell your teachers and school nurse about your injury and symptoms. Ask them to watch for problems with learning, memory, and concentrating. Symptoms may get worse when you do schoolwork, and you may become more irritable. You may need shorter school days, a reduced workload, and to postpone testing.  Do not drive or take gym class (physical activity) until cleared by a provider.    Returning to Sports:  Never return to play if you have any symptoms. A full recovery will reduce the chances of getting hurt again. Remember, it is better to miss one or two games than a whole season.  You should rest from all physical activity until you see your provider. Generally, if all symptoms have completely cleared, your provider can help guide you to slowly return to sports. If symptoms return or worsen, stop the activity and see your provider.  Important: If you are in an organized sport and under age 18, you will need written consent from a healthcare provider before you return to sports. Typically, this will be your primary care or sports medicine provider. Please make an appointment.    If you were given a prescription for medicine here today, be sure to read all of the information (including the package insert) that comes with your prescription.  This will  include important information about the medicine, its side effects, and any warnings that you need to know about.  The pharmacist who fills the prescription can provide more information and answer questions you may have about the medicine.  If you have questions or concerns that the pharmacist cannot address, please call or return to the Emergency Department.     Remember that you can always come back to the Emergency Department if you are not able to see your regular provider in the amount of time listed above, if you get any new symptoms, or if there is anything that worries you.    Discharge Instructions  Laceration (Cut)    You were seen today for a laceration (cut).  Your provider examined your laceration for any problems such a buried foreign body (like glass, a splinter, or gravel), or injury to blood vessels, tendons, and nerves.  Your provider may have also rinsed and/or scrubbed your laceration to help prevent an infection. It may not be possible to find all problems with your laceration on the first visit; occasionally foreign bodies or a tendon injury can go undetected.    Your laceration may have been closed in one of several ways:  No closure: many wounds will heal just fine without closure.  Stitches: regular stitches that require removal.  Staples: skin staples are often used in the scalp/head.  Wound adhesive (glue): skin glue can be used for certain lacerations and doesn t require removal.  Wound strips (aka Butterfly bandages or steri-strips): these are bandages that help to close a wound.  Absorbable stitches:  dissolving  stitches that go away on their own and usually don t require removal.    A small percentage of wounds will develop an infection regardless of how well the wound is cared for. Antibiotics are generally not indicated to prevent an infection so are only given for a small number of high-risk wounds. Some lacerations are too high risk to close, and are left open to heal because  closure can increase the likelihood that an infection will develop.    Remember that all lacerations, no matter how expertly repaired, will cause scarring. We consider many factors, techniques, and materials, in our efforts to provide the best possible cosmetic outcome.    Generally, every Emergency Department visit should have a follow-up clinic visit with either a primary or a specialty clinic/provider. Please follow-up as instructed by your emergency provider today.     Return to the Emergency Department right away if:  You have more redness, swelling, pain, drainage (pus), a bad smell, or red streaking from your laceration as these symptoms could indicate an infection.  You have a fever of 100.4 F or more.  You have bleeding that you cannot stop at home. If your cut starts to bleed, hold pressure on the bleeding area with a clean cloth or put pressure over the bandage.  If the bleeding does not stop after using constant pressure for 30 minutes, you should return to the Emergency Department for further treatment.  An area past the laceration is cool, pale, or blue compared with the other side, or has a slower return of color when squeezed.  Your dressing seems too tight or starts to get uncomfortable or painful. For children, signs of a problem might be irritability or restlessness.  You have loss of normal function or use of an area, such as being unable to straighten or bend a finger normally.  You have a numb area past the laceration.    Return to the Emergency Department or see your regular provider if:  The laceration starts to come open.   You have something coming out of the cut or a feeling that there is something in the laceration.  Your wound will not heal, or keeps breaking open. There can always be glass, wood, dirt or other things in any wound.  They will not always show up, even on x-rays.  If a wound does not heal, this may be why, and it is important to follow-up with your regular  provider.    Home Care:  Take your dressing off in 12-24 hours, or as instructed by your provider, to check your laceration. Remove the dressing sooner if it seems too tight or painful, or if it is getting numb, tingly, or pale past the dressing.  Gently wash your laceration 1-2 times daily with clean water and mild soap. It is okay to shower or run clean water over the laceration, but do not let the laceration soak in water (no swimming).  If your laceration was closed with wound adhesive or strips: pat it dry and leave it open to the air. For all other repairs: after you wash your laceration, or at least 2 times a day, apply antibiotic ointment (such as Neosporin  or Bacitracin ) to the laceration, then cover it with a Band-Aid  or gauze.  Keep the laceration clean. Wear gloves or other protective clothing if you are around dirt.    Follow-up for removal:  If your wound was closed with staples or regular stitches, they need to be removed according to the instructions and timeline specified by your provider today.  If your wound was closed with absorbable ( dissolving ) sutures, they should fall out, dissolve, or not be visible in about one week. If they are still visible, then they should be removed according to the instructions and timeline specified by your provider today.    Scars:  To help minimize scarring:  Wear sunscreen over the healed laceration when out in the sun.  Massage the area regularly once healed.  You may apply Vitamin E to the healed wound.  Wait. Scars improve in appearance over months and years.    If you were given a prescription for medicine here today, be sure to read all of the information (including the package insert) that comes with your prescription.  This will include important information about the medicine, its side effects, and any warnings that you need to know about.  The pharmacist who fills the prescription can provide more information and answer questions you may have about  the medicine.  If you have questions or concerns that the pharmacist cannot address, please call or return to the Emergency Department.       Remember that you can always come back to the Emergency Department if you are not able to see your regular provider in the amount of time listed above, if you get any new symptoms, or if there is anything that worries you.     room air

## 2022-12-21 ENCOUNTER — TELEPHONE (OUTPATIENT)
Dept: MAMMOGRAPHY | Facility: CLINIC | Age: 62
End: 2022-12-21

## 2022-12-21 NOTE — TELEPHONE ENCOUNTER
Patient Quality Outreach    Patient is due for the following:   Breast Cancer Screening - Mammogram    Next Steps:   Patient was scheduled for mammogram    Type of outreach:    Phone, spoke to patient/parent. scheduled for 12/29 at 10:00  2nd call    Next Steps:  Reach out within 90 days via Phone.    Max number of attempts reached: No. Will try again in 90 days if patient still on fail list.    Questions for provider review:    None     JASIEL Ceja  Chart routed to Care Team.

## 2022-12-23 DIAGNOSIS — G24.1 GENETIC TORSION DYSTONIA: ICD-10-CM

## 2022-12-23 DIAGNOSIS — G24.3 SPASMODIC TORTICOLLIS: Primary | ICD-10-CM

## 2022-12-29 ENCOUNTER — ANCILLARY PROCEDURE (OUTPATIENT)
Dept: MAMMOGRAPHY | Facility: CLINIC | Age: 62
End: 2022-12-29
Attending: FAMILY MEDICINE
Payer: MEDICARE

## 2022-12-29 DIAGNOSIS — Z12.31 VISIT FOR SCREENING MAMMOGRAM: ICD-10-CM

## 2022-12-29 PROCEDURE — 77067 SCR MAMMO BI INCL CAD: CPT | Mod: TC | Performed by: RADIOLOGY

## 2023-02-02 ENCOUNTER — OFFICE VISIT (OUTPATIENT)
Dept: PHYSICAL MEDICINE AND REHAB | Facility: CLINIC | Age: 63
End: 2023-02-02
Payer: MEDICARE

## 2023-02-02 VITALS — DIASTOLIC BLOOD PRESSURE: 67 MMHG | SYSTOLIC BLOOD PRESSURE: 98 MMHG | HEART RATE: 76 BPM

## 2023-02-02 DIAGNOSIS — G24.3 SPASMODIC TORTICOLLIS: Primary | ICD-10-CM

## 2023-02-02 DIAGNOSIS — G24.1 GENETIC TORSION DYSTONIA: ICD-10-CM

## 2023-02-02 PROCEDURE — 64616 CHEMODENERV MUSC NECK DYSTON: CPT | Mod: 50 | Performed by: PHYSICAL MEDICINE & REHABILITATION

## 2023-02-02 PROCEDURE — 64642 CHEMODENERV 1 EXTREMITY 1-4: CPT | Performed by: PHYSICAL MEDICINE & REHABILITATION

## 2023-02-02 PROCEDURE — 64643 CHEMODENERV 1 EXTREM 1-4 EA: CPT | Performed by: PHYSICAL MEDICINE & REHABILITATION

## 2023-02-02 PROCEDURE — 95874 GUIDE NERV DESTR NEEDLE EMG: CPT | Performed by: PHYSICAL MEDICINE & REHABILITATION

## 2023-02-02 NOTE — LETTER
2/2/2023         RE: Kendra Vaca  [address] [facility] [address]        Dear Colleague,    Thank you for referring your patient, Kendra Vaca, to the [facility]. Please see a copy of my visit note below.      [facility]    PM&R CLINIC NOTE  BOTULINUM TOXIN PROCEDURE      HPI  Chief Complaint   Patient presents with     Botox     Kendra Vaca is a 62 year old female with a history of involuntary spasms of the neck and shoulder muscles with tremor who presents to clinic for botulinum toxin injections for management of cervical dystonia.     SINCE LAST VISIT  Kendra Vaca was last seen here in clinic on 11/10/2022, at which time she received 200 units of Botox.    Patient reports the following new medical problems since last visit: She sustained a mechanical fall in her garage on 12/11/2022 and she fell onto a chainsaw with the right side of her forehead which required five stitches. She also had a CT scan of her head which was negative. She has made a full recovery.     RESPONSE TO PREVIOUS TREATMENT    Side effects: No problems reported    Pain Improvement: Yes.  Percent Improvement: 90 %    Duration of Benefit:  10 weeks and followed by a gradual reduction in benefit.    Dystonia Improvement: Yes.  Percent Improvement: 90 %    Duration of Benefit:  10 weeks and followed by a gradual reduction in benefit. She has noticed a return of tremors and dystonic pulling over the last week and a half.        PHYSICAL EXAM  VS: BP 98/67 (BP Location: Right arm, Patient Position: Sitting)   Pulse 76   LMP 02/15/2012    GEN: Pleasant and cooperative, in no acute distress  HEENT: No facial asymmetry  HEAD, NECK AND TRUNK PATTERN:   Continuous multidirectional tremor  Right rotation  Left side bending      ALLERGIES  Allergies   Allergen Reactions     Ciprofloxacin Anaphylaxis and Other (See Comments)     Throat swelling        Morphine Sulfate Hives     Seafood Hives     Shellfish-Derived Products Anaphylaxis     Latex      CONTACT RASH WITH LATEX PRODUCTS IN THE HEAT OF SUMMER       CURRENT MEDICATIONS    Current Outpatient Medications:      acetaminophen (TYLENOL) 500 MG tablet, Take 500-1,000 mg by mouth every 6 hours as needed for mild pain (can take 4 per day), Disp: , Rfl:      azelastine (OPTIVAR) 0.05 % SOLN, Place 1 drop into both eyes as needed , Disp: , Rfl:      EPINEPHrine (EPIPEN JR) 0.15 MG/0.3ML injection, Inject 0.15 mg into the muscle as needed for anaphylaxis, Disp: , Rfl:      ibuprofen (ADVIL/MOTRIN) 800 MG tablet, Take 800 mg by mouth, Disp: , Rfl:      penciclovir (DENAVIR) 1 % external cream, Apply 1 g topically every 2 hours, Disp: , Rfl:      azelastine (OPTIVAR) 0.05 % ophthalmic solution, Apply 1 drop to eye, Disp: , Rfl:      ketoconazole (NIZORAL) 2 % external cream, , Disp: , Rfl:      naproxen (NAPROSYN) 500 MG tablet, Take 1 tablet (500 mg) by mouth 2 times daily (with meals) (Patient not taking: Reported on 2023), Disp: 28 tablet, Rfl: 0    Current Facility-Administered Medications:      botulinum toxin type A (BOTOX) 100 units injection 300 Units, 300 Units, Intramuscular, Q90 Days, StandalVirginia MD       BOTULINUM NEUROTOXIN INJECTION PROCEDURES    VERIFICATION OF PATIENT IDENTIFICATION AND PROCEDURE     Initials   Patient Name SES   Patient  SES   Procedure Verified by: SES     Prior to the start of the procedure and with procedural staff participation, I verbally confirmed the patient s identity using two indicators, relevant allergies, that the procedure was appropriate and matched the consent or emergent situation, and that the correct equipment/implants were available. Immediately prior to starting the procedure I conducted the Time Out with the procedural staff and re-confirmed the patient s name, procedure, and site/side. 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  She is here today for reinjection with Botox.    GOAL OF PROCEDURE  The goal of this procedure is to increase active range of motion, improve volitional motor control, decrease pain  and enhance functional independence.      TOTAL DOSE ADMINISTERED  Dose Administered:  200 units  Botox (Botulinum Toxin Type A)       1:1 Dilution   Unavoidable Drug Waste: No  Diluent Used:  Preservative Free Normal Saline  Total Volume of Diluent Used:  2 ml  NDC #: Botox 100u ([address]-62)      CONSENT  The risks, benefits, and treatment options were discussed with Kendra Vaca and she agreed to proceed.    Written consent was obtained by [facility].     EQUIPMENT USED  Needle-37mm stimulating/recording  EMG/NCS Machine    SKIN PREPARATION  Skin preparation was performed using an alcohol wipe.    GUIDANCE DESCRIPTION  Electro-myographic guidance was necessary throughout the procedure to accurately identify all areas of dystonic muscles while avoiding injection of non-dystonic muscles, neighboring nerves and nearby vascular structures.       AREA/MUSCLE INJECTED:  200 UNITS BOTOX = TOTAL DOSE, 1:1 DILUTION    1. HEAD & NECK MUSCLES: 200 UNITS BOTOX = TOTAL DOSE     Right Mid-Trapezius - 25 units of Botox at 1 site/s.   Left Mid-Trapezius - 15 units of Botox at 1 site/s.      Right lateral Trapezius - 30 units of Botox at 1 site.  Left lateral Trapezius - 10 units of Botox at 1 site.      Right Splenius Cervicis - 30 units of Botox at 1 site/s.   Left Splenius Cervicis - 20 units of Botox at 1 site/s.      Right Levator Scapulae - 20 units of Botox at 1 site/s (shoulder muscles).   Left  Levator Scapulae - 20 units of Botox at 1 site/s (shoulder muscles).                  Right Sternocleidomastoid - 20 units of Botox at 1 site/s.               Left Sternocleidomastoid - 10 units of Botox at 1 site/s.       RESPONSE TO PROCEDURE  Kendra Vaca tolerated the procedure well and there were no immediate complications. She was allowed to recover for an appropriate period of time and was discharged home in stable condition.    ASSESSMENT AND PLAN   1. Botulinum toxin injections: No changes made to Botox dose or distribution today. Patient will continue to monitor response and report at next appointment.   2. Referrals: None.   3. Follow up: Kendra Vaca was rescheduled for the next series of injections in 12 weeks, at which time we will evaluate response to today's injections. she may call the clinic prior with any questions or concerns prior to the next appointment.       Again, thank you for allowing me to participate in the care of your patient.        Sincerely,        Virginia Rosas MD

## 2023-02-02 NOTE — PROGRESS NOTES
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     SINCE LAST VISIT  Kendra Vaca was last seen here in clinic on 11/10/2022, at which time she received 200 units of Botox.    Patient reports the following new medical problems since last visit: She sustained a mechanical fall in her garage on 12/11/2022 and she fell onto a chainsaw with the right side of her forehead which required five stitches. She also had a CT scan of her head which was negative. She has made a full recovery.     RESPONSE TO PREVIOUS TREATMENT    Side effects: No problems reported    Pain Improvement: Yes.  Percent Improvement: 90 %    Duration of Benefit:  10 weeks and followed by a gradual reduction in benefit.    Dystonia Improvement: Yes.  Percent Improvement: 90 %    Duration of Benefit:  10 weeks and followed by a gradual reduction in benefit. She has noticed a return of tremors and dystonic pulling over the last week and a half.        PHYSICAL EXAM  VS: BP 98/67 (BP Location: Right arm, Patient Position: Sitting)   Pulse 76   LMP 02/15/2012    GEN: Pleasant and cooperative, in no acute distress  HEENT: No facial asymmetry  HEAD, NECK AND TRUNK PATTERN:   Continuous multidirectional tremor  Right rotation  Left side bending      ALLERGIES  Allergies   Allergen Reactions     Ciprofloxacin Anaphylaxis and Other (See Comments)     Throat swelling       Morphine Sulfate Hives     Seafood Hives     Shellfish-Derived Products Anaphylaxis     Latex      CONTACT RASH WITH LATEX PRODUCTS IN THE HEAT OF SUMMER       CURRENT MEDICATIONS    Current Outpatient Medications:      acetaminophen (TYLENOL) 500 MG tablet, Take  500-1,000 mg by mouth every 6 hours as needed for mild pain (can take 4 per day), Disp: , Rfl:      azelastine (OPTIVAR) 0.05 % SOLN, Place 1 drop into both eyes as needed , Disp: , Rfl:      EPINEPHrine (EPIPEN JR) 0.15 MG/0.3ML injection, Inject 0.15 mg into the muscle as needed for anaphylaxis, Disp: , Rfl:      ibuprofen (ADVIL/MOTRIN) 800 MG tablet, Take 800 mg by mouth, Disp: , Rfl:      penciclovir (DENAVIR) 1 % external cream, Apply 1 g topically every 2 hours, Disp: , Rfl:      azelastine (OPTIVAR) 0.05 % ophthalmic solution, Apply 1 drop to eye, Disp: , Rfl:      ketoconazole (NIZORAL) 2 % external cream, , Disp: , Rfl:      naproxen (NAPROSYN) 500 MG tablet, Take 1 tablet (500 mg) by mouth 2 times daily (with meals) (Patient not taking: Reported on 2023), Disp: 28 tablet, Rfl: 0    Current Facility-Administered Medications:      botulinum toxin type A (BOTOX) 100 units injection 300 Units, 300 Units, Intramuscular, Q90 Days, Virginia Rosas MD       BOTULINUM NEUROTOXIN INJECTION PROCEDURES    VERIFICATION OF PATIENT IDENTIFICATION AND PROCEDURE     Initials   Patient Name SES   Patient  SES   Procedure Verified by: SES     Prior to the start of the procedure and with procedural staff participation, I verbally confirmed the patient s identity using two indicators, relevant allergies, that the procedure was appropriate and matched the consent or emergent situation, and that the correct equipment/implants were available. Immediately prior to starting the procedure I conducted the Time Out with the procedural staff and re-confirmed the patient s name, procedure, and site/side. 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  She is here today for reinjection with Botox.    GOAL OF PROCEDURE  The goal of this procedure is to increase active range of motion, improve volitional motor control, decrease pain  and enhance functional independence.      TOTAL DOSE ADMINISTERED  Dose Administered:  200 units  Botox (Botulinum Toxin Type A)       1:1 Dilution   Unavoidable Drug Waste: No  Diluent Used:  Preservative Free Normal Saline  Total Volume of Diluent Used:  2 ml  NDC #: Botox 100u ([address]-47)      CONSENT  The risks, benefits, and treatment options were discussed with Kendra Vaca and she agreed to proceed.    Written consent was obtained by [facility].     EQUIPMENT USED  Needle-37mm stimulating/recording  EMG/NCS Machine    SKIN PREPARATION  Skin preparation was performed using an alcohol wipe.    GUIDANCE DESCRIPTION  Electro-myographic guidance was necessary throughout the procedure to accurately identify all areas of dystonic muscles while avoiding injection of non-dystonic muscles, neighboring nerves and nearby vascular structures.       AREA/MUSCLE INJECTED:  200 UNITS BOTOX = TOTAL DOSE, 1:1 DILUTION    1. HEAD & NECK MUSCLES: 200 UNITS BOTOX = TOTAL DOSE     Right Mid-Trapezius - 25 units of Botox at 1 site/s.   Left Mid-Trapezius - 15 units of Botox at 1 site/s.      Right lateral Trapezius - 30 units of Botox at 1 site.  Left lateral Trapezius - 10 units of Botox at 1 site.      Right Splenius Cervicis - 30 units of Botox at 1 site/s.   Left Splenius Cervicis - 20 units of Botox at 1 site/s.      Right Levator Scapulae - 20 units of Botox at 1 site/s (shoulder muscles).   Left Levator Scapulae - 20 units of Botox at 1 site/s (shoulder muscles).                  Right Sternocleidomastoid - 20 units of Botox at 1 site/s.               Left Sternocleidomastoid - 10 units of Botox at 1 site/s.       RESPONSE TO PROCEDURE  Kendra MARLEY  Nola tolerated the procedure well and there were no immediate complications. She was allowed to recover for an appropriate period of time and was discharged home in stable condition.    ASSESSMENT AND PLAN   1. Botulinum toxin injections: No changes made to Botox dose or distribution today. Patient will continue to monitor response and report at next appointment.   2. Referrals: None.   3. Follow up: Kendra Vaca was rescheduled for the next series of injections in 12 weeks, at which time we will evaluate response to today's injections. she may call the clinic prior with any questions or concerns prior to the next appointment.

## 2023-04-27 ENCOUNTER — OFFICE VISIT (OUTPATIENT)
Dept: PHYSICAL MEDICINE AND REHAB | Facility: CLINIC | Age: 63
End: 2023-04-27
Payer: MEDICARE

## 2023-04-27 VITALS — DIASTOLIC BLOOD PRESSURE: 69 MMHG | SYSTOLIC BLOOD PRESSURE: 105 MMHG | HEART RATE: 78 BPM

## 2023-04-27 DIAGNOSIS — G24.3 SPASMODIC TORTICOLLIS: Primary | ICD-10-CM

## 2023-04-27 DIAGNOSIS — G24.1 GENETIC TORSION DYSTONIA: ICD-10-CM

## 2023-04-27 PROCEDURE — 95874 GUIDE NERV DESTR NEEDLE EMG: CPT | Performed by: PHYSICAL MEDICINE & REHABILITATION

## 2023-04-27 PROCEDURE — 64642 CHEMODENERV 1 EXTREMITY 1-4: CPT | Performed by: PHYSICAL MEDICINE & REHABILITATION

## 2023-04-27 PROCEDURE — 64643 CHEMODENERV 1 EXTREM 1-4 EA: CPT | Performed by: PHYSICAL MEDICINE & REHABILITATION

## 2023-04-27 PROCEDURE — 64616 CHEMODENERV MUSC NECK DYSTON: CPT | Mod: 50 | Performed by: PHYSICAL MEDICINE & REHABILITATION

## 2023-04-27 NOTE — LETTER
4/27/2023         RE: Kendra Vaca  [address] [facility] [address]        Dear Colleague,    Thank you for referring your patient, Kendra Vaca, to the [facility]. Please see a copy of my visit note below.      [facility]    PM&R CLINIC NOTE  BOTULINUM TOXIN PROCEDURE      HPI  Chief Complaint   Patient presents with     Botox     Kendra Vaca is a 62 year old female with a history of involuntary spasms of the neck and shoulder muscles with tremor who presents to clinic for botulinum toxin injections for management of cervical dystonia.     SINCE LAST VISIT  Kendra Vaca was last seen here in clinic on 2/2/2023, at which time she received 200 units of Botox.    Patient denies new medical diagnoses, illnesses, hospitalizations, emergency room visits, and injuries since the previous injection with botulinum neurotoxin.     RESPONSE TO PREVIOUS TREATMENT    Side effects: No problems reported    Pain Improvement: Yes.  Percent Improvement: 90 %    Duration of Benefit:  10 weeks and followed by a gradual reduction in benefit    Dystonia Improvement: Yes.  Percent Improvement: 90 %    Duration of Benefit:  10 weeks and followed by a gradual reduction in benefit. She has noticed a return of tremors and dystonic pulling over the last week and a half.        PHYSICAL EXAM  VS: /69 (BP Location: Right arm, Patient Position: Sitting)   Pulse 78   LMP 02/15/2012    GEN: Pleasant and cooperative, in no acute distress  HEENT: No facial asymmetry  HEAD, NECK AND TRUNK PATTERN:   Continuous multidirectional tremor  Right rotation  Left side bending      ALLERGIES  Allergies   Allergen Reactions     Ciprofloxacin Anaphylaxis and Other (See Comments)     Throat swelling       Morphine Sulfate Hives     Seafood Hives     Shellfish-Derived Products Anaphylaxis     Latex      CONTACT RASH WITH LATEX PRODUCTS IN THE HEAT OF SUMMER        CURRENT MEDICATIONS    Current Outpatient Medications:      acetaminophen (TYLENOL) 500 MG tablet, Take 500-1,000 mg by mouth every 6 hours as needed for mild pain (can take 4 per day), Disp: , Rfl:      azelastine (OPTIVAR) 0.05 % SOLN, Place 1 drop into both eyes as needed , Disp: , Rfl:      EPINEPHrine (EPIPEN JR) 0.15 MG/0.3ML injection, Inject 0.15 mg into the muscle as needed for anaphylaxis, Disp: , Rfl:      ibuprofen (ADVIL/MOTRIN) 800 MG tablet, Take 800 mg by mouth, Disp: , Rfl:      ketoconazole (NIZORAL) 2 % external cream, , Disp: , Rfl:      naproxen (NAPROSYN) 500 MG tablet, Take 1 tablet (500 mg) by mouth 2 times daily (with meals), Disp: 28 tablet, Rfl: 0     penciclovir (DENAVIR) 1 % external cream, Apply 1 g topically every 2 hours, Disp: , Rfl:      azelastine (OPTIVAR) 0.05 % ophthalmic solution, Apply 1 drop to eye, Disp: , Rfl:     Current Facility-Administered Medications:      botulinum toxin type A (BOTOX) 100 units injection 300 Units, 300 Units, Intramuscular, Q90 Days, Virginia Rosas MD, 200 Units at 23 1339       BOTULINUM NEUROTOXIN INJECTION PROCEDURES    VERIFICATION OF PATIENT IDENTIFICATION AND PROCEDURE     Initials   Patient Name SES   Patient  SES   Procedure Verified by: SES     Prior to the start of the procedure and with procedural staff participation, I verbally confirmed the patient s identity using two indicators, relevant allergies, that the procedure was appropriate and matched the consent or emergent situation, and that the correct equipment/implants were available. Immediately prior to starting the procedure I conducted the Time Out with the procedural staff and re-confirmed the patient s name, procedure, and site/side. (The Joint Commission universal protocol was followed.)  Yes    Sedation (Moderate or Deep): None    ABOVE ASSESSMENTS PERFORMED BY    Virginia Rosas MD      INDICATIONS FOR PROCEDURES  Kendra Vaca is a 62 year old  patient with a history of involuntary spasms of the neck and shoulder muscles with tremor who presents to clinic for botulinum toxin injections for management of cervical dystonia.  She is here today for reinjection with Botox.    GOAL OF PROCEDURE  The goal of this procedure is to increase active range of motion, improve volitional motor control, decrease pain  and enhance functional independence.      TOTAL DOSE ADMINISTERED  Dose Administered:  200 units  Botox (Botulinum Toxin Type A)       1:1 Dilution   Unavoidable Drug Waste: No  Diluent Used:  Preservative Free Normal Saline  Total Volume of Diluent Used:  2 ml  NDC #: Botox 100u ([address]-34)      CONSENT  The risks, benefits, and treatment options were discussed with Kendra Vaca and she agreed to proceed.    Written consent was obtained by dejon.     EQUIPMENT USED  Needle-37mm stimulating/recording  EMG/NCS Machine    SKIN PREPARATION  Skin preparation was performed using an alcohol wipe.    GUIDANCE DESCRIPTION  Electro-myographic guidance was necessary throughout the procedure to accurately identify all areas of dystonic muscles while avoiding injection of non-dystonic muscles, neighboring nerves and nearby vascular structures.       AREA/MUSCLE INJECTED:  200 UNITS BOTOX = TOTAL DOSE, 1:1 DILUTION    1. HEAD & NECK MUSCLES: 200 UNITS BOTOX = TOTAL DOSE     Right Mid-Trapezius - 25 units of Botox at 1 site/s.   Left Mid-Trapezius - 15 units of Botox at 1 site/s.      Right lateral Trapezius - 30 units of Botox at 1 site.  Left lateral Trapezius - 10 units of Botox at 1 site.      Right Splenius Cervicis - 30 units of Botox at 1 site/s.   Left Splenius Cervicis - 20 units of Botox at 1 site/s.      Right Levator Scapulae - 20 units of Botox at 1 site/s (shoulder muscles).   Left Levator Scapulae - 20 units of Botox at 1 site/s (shoulder muscles).                  Right Sternocleidomastoid - 20 units of Botox at 1 site/s.               Left  Sternocleidomastoid - 10 units of Botox at 1 site/s.       RESPONSE TO PROCEDURE  Kendra Vaca tolerated the procedure well and there were no immediate complications. She was allowed to recover for an appropriate period of time and was discharged home in stable condition.    ASSESSMENT AND PLAN   1. Botulinum toxin injections: No changes made to Botox dose or distribution today. Patient will continue to monitor response and report at next appointment.   2. Referrals: None.   3. Follow up: Kendra Vaca was rescheduled for the next series of injections in 12 weeks, at which time we will evaluate response to today's injections. she may call the clinic prior with any questions or concerns prior to the next appointment.       Again, thank you for allowing me to participate in the care of your patient.        Sincerely,        Virginia Rosas MD

## 2023-05-01 NOTE — PROGRESS NOTES
[facility]    PM&R CLINIC NOTE  BOTULINUM TOXIN PROCEDURE      HPI  Chief Complaint   Patient presents with     Botox     Kendra Vaca is a 62 year old female with a history of involuntary spasms of the neck and shoulder muscles with tremor who presents to clinic for botulinum toxin injections for management of cervical dystonia.     SINCE LAST VISIT  Kendra Vaca was last seen here in clinic on 2/2/2023, at which time she received 200 units of Botox.    Patient denies new medical diagnoses, illnesses, hospitalizations, emergency room visits, and injuries since the previous injection with botulinum neurotoxin.     RESPONSE TO PREVIOUS TREATMENT    Side effects: No problems reported    Pain Improvement: Yes.  Percent Improvement: 90 %    Duration of Benefit:  10 weeks and followed by a gradual reduction in benefit    Dystonia Improvement: Yes.  Percent Improvement: 90 %    Duration of Benefit:  10 weeks and followed by a gradual reduction in benefit. She has noticed a return of tremors and dystonic pulling over the last week and a half.        PHYSICAL EXAM  VS: /69 (BP Location: Right arm, Patient Position: Sitting)   Pulse 78   LMP 02/15/2012    GEN: Pleasant and cooperative, in no acute distress  HEENT: No facial asymmetry  HEAD, NECK AND TRUNK PATTERN:   Continuous multidirectional tremor  Right rotation  Left side bending      ALLERGIES  Allergies   Allergen Reactions     Ciprofloxacin Anaphylaxis and Other (See Comments)     Throat swelling       Morphine Sulfate Hives     Seafood Hives     Shellfish-Derived Products Anaphylaxis     Latex      CONTACT RASH WITH LATEX PRODUCTS IN THE HEAT OF SUMMER       CURRENT MEDICATIONS    Current Outpatient Medications:      acetaminophen (TYLENOL) 500 MG tablet, Take 500-1,000 mg by mouth every 6 hours as needed for mild pain (can take 4 per day), Disp: , Rfl:      azelastine (OPTIVAR) 0.05 % SOLN, Place 1 drop into both  eyes as needed , Disp: , Rfl:      EPINEPHrine (EPIPEN JR) 0.15 MG/0.3ML injection, Inject 0.15 mg into the muscle as needed for anaphylaxis, Disp: , Rfl:      ibuprofen (ADVIL/MOTRIN) 800 MG tablet, Take 800 mg by mouth, Disp: , Rfl:      ketoconazole (NIZORAL) 2 % external cream, , Disp: , Rfl:      naproxen (NAPROSYN) 500 MG tablet, Take 1 tablet (500 mg) by mouth 2 times daily (with meals), Disp: 28 tablet, Rfl: 0     penciclovir (DENAVIR) 1 % external cream, Apply 1 g topically every 2 hours, Disp: , Rfl:      azelastine (OPTIVAR) 0.05 % ophthalmic solution, Apply 1 drop to eye, Disp: , Rfl:     Current Facility-Administered Medications:      botulinum toxin type A (BOTOX) 100 units injection 300 Units, 300 Units, Intramuscular, Q90 Days, Virginia Rosas MD, 200 Units at 23 1339       BOTULINUM NEUROTOXIN INJECTION PROCEDURES    VERIFICATION OF PATIENT IDENTIFICATION AND PROCEDURE     Initials   Patient Name SES   Patient  SES   Procedure Verified by: SES     Prior to the start of the procedure and with procedural staff participation, I verbally confirmed the patient s identity using two indicators, relevant allergies, that the procedure was appropriate and matched the consent or emergent situation, and that the correct equipment/implants were available. Immediately prior to starting the procedure I conducted the Time Out with the procedural staff and re-confirmed the patient s name, procedure, and site/side. 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  She is here today for reinjection with Botox.    GOAL OF PROCEDURE  The goal of this  procedure is to increase active range of motion, improve volitional motor control, decrease pain  and enhance functional independence.      TOTAL DOSE ADMINISTERED  Dose Administered:  200 units  Botox (Botulinum Toxin Type A)       1:1 Dilution   Unavoidable Drug Waste: No  Diluent Used:  Preservative Free Normal Saline  Total Volume of Diluent Used:  2 ml  NDC #: Botox 100u ([address]-00)      CONSENT  The risks, benefits, and treatment options were discussed with Kendra Vaca and she agreed to proceed.    Written consent was obtained by dejon.     EQUIPMENT USED  Needle-37mm stimulating/recording  EMG/NCS Machine    SKIN PREPARATION  Skin preparation was performed using an alcohol wipe.    GUIDANCE DESCRIPTION  Electro-myographic guidance was necessary throughout the procedure to accurately identify all areas of dystonic muscles while avoiding injection of non-dystonic muscles, neighboring nerves and nearby vascular structures.       AREA/MUSCLE INJECTED:  200 UNITS BOTOX = TOTAL DOSE, 1:1 DILUTION    1. HEAD & NECK MUSCLES: 200 UNITS BOTOX = TOTAL DOSE     Right Mid-Trapezius - 25 units of Botox at 1 site/s.   Left Mid-Trapezius - 15 units of Botox at 1 site/s.      Right lateral Trapezius - 30 units of Botox at 1 site.  Left lateral Trapezius - 10 units of Botox at 1 site.      Right Splenius Cervicis - 30 units of Botox at 1 site/s.   Left Splenius Cervicis - 20 units of Botox at 1 site/s.      Right Levator Scapulae - 20 units of Botox at 1 site/s (shoulder muscles).   Left Levator Scapulae - 20 units of Botox at 1 site/s (shoulder muscles).                  Right Sternocleidomastoid - 20 units of Botox at 1 site/s.               Left Sternocleidomastoid - 10 units of Botox at 1 site/s.       RESPONSE TO PROCEDURE  Kendra Vaca tolerated the procedure well and there were no immediate complications. She was allowed to recover for an appropriate period of time and was discharged home in stable  condition.    ASSESSMENT AND PLAN   1. Botulinum toxin injections: No changes made to Botox dose or distribution today. Patient will continue to monitor response and report at next appointment.   2. Referrals: None.   3. Follow up: Kendra Vaca was rescheduled for the next series of injections in 12 weeks, at which time we will evaluate response to today's injections. she may call the clinic prior with any questions or concerns prior to the next appointment.

## 2023-07-20 ENCOUNTER — OFFICE VISIT (OUTPATIENT)
Dept: PHYSICAL MEDICINE AND REHAB | Facility: CLINIC | Age: 63
End: 2023-07-20
Payer: MEDICARE

## 2023-07-20 DIAGNOSIS — G24.1 GENETIC TORSION DYSTONIA: ICD-10-CM

## 2023-07-20 DIAGNOSIS — G24.3 SPASMODIC TORTICOLLIS: Primary | ICD-10-CM

## 2023-07-20 PROCEDURE — 64616 CHEMODENERV MUSC NECK DYSTON: CPT | Mod: 50 | Performed by: PHYSICAL MEDICINE & REHABILITATION

## 2023-07-20 PROCEDURE — 95874 GUIDE NERV DESTR NEEDLE EMG: CPT | Performed by: PHYSICAL MEDICINE & REHABILITATION

## 2023-07-20 PROCEDURE — 64642 CHEMODENERV 1 EXTREMITY 1-4: CPT | Performed by: PHYSICAL MEDICINE & REHABILITATION

## 2023-07-20 PROCEDURE — 64643 CHEMODENERV 1 EXTREM 1-4 EA: CPT | Performed by: PHYSICAL MEDICINE & REHABILITATION

## 2023-07-20 NOTE — LETTER
7/20/2023         RE: Kendra Vaca  [address] [facility] [address]        Dear Colleague,    Thank you for referring your patient, Kendra Vaca, to the [facility]. Please see a copy of my visit note below.      [facility]    PM&R CLINIC NOTE  BOTULINUM TOXIN PROCEDURE      HPI  Chief Complaint   Patient presents with     Procedure     Botox     Kendra Vaca is a 63 year old female with a history of involuntary spasms of the neck and shoulder muscles with tremor who presents to clinic for botulinum toxin injections for management of cervical dystonia.     SINCE LAST VISIT  Kendra Vaca was last seen here in clinic on 4/27/2023, at which time she received 200 units of Botox.    Patient denies new medical diagnoses, illnesses, hospitalizations, emergency room visits, and injuries since the previous injection with botulinum neurotoxin.     RESPONSE TO PREVIOUS TREATMENT    Side effects: No problems reported    Pain Improvement: Yes.  Percent Improvement: 90 %    Duration of Benefit:  10 weeks and followed by a gradual reduction in benefit    Dystonia Improvement: Yes.  Percent Improvement: 90 %    Duration of Benefit:  10 weeks and followed by a gradual reduction in benefit. She has noticed a return of tremors and dystonic pulling over the last week and a half. She spilled her coffee on herself this morning due to severe tremors.        PHYSICAL EXAM  VS: [facility] 02/15/2012    GEN: Pleasant and cooperative, in no acute distress  HEENT: No facial asymmetry  HEAD, NECK AND TRUNK PATTERN:   Continuous multidirectional tremor  Right rotation  Left side bending      ALLERGIES  Allergies   Allergen Reactions     Ciprofloxacin Anaphylaxis and Other (See Comments)     Throat swelling       Morphine Sulfate Hives     Seafood Hives     Shellfish-Derived Products Anaphylaxis     Latex      CONTACT RASH WITH LATEX PRODUCTS IN THE HEAT OF  SUMMER       CURRENT MEDICATIONS    Current Outpatient Medications:      acetaminophen (TYLENOL) 500 MG tablet, Take 500-1,000 mg by mouth every 6 hours as needed for mild pain (can take 4 per day), Disp: , Rfl:      azelastine (OPTIVAR) 0.05 % ophthalmic solution, Apply 1 drop to eye, Disp: , Rfl:      azelastine (OPTIVAR) 0.05 % SOLN, Place 1 drop into both eyes as needed , Disp: , Rfl:      EPINEPHrine (EPIPEN JR) 0.15 MG/0.3ML injection, Inject 0.15 mg into the muscle as needed for anaphylaxis, Disp: , Rfl:      ibuprofen (ADVIL/MOTRIN) 800 MG tablet, Take 800 mg by mouth, Disp: , Rfl:      ketoconazole (NIZORAL) 2 % external cream, , Disp: , Rfl:      naproxen (NAPROSYN) 500 MG tablet, Take 1 tablet (500 mg) by mouth 2 times daily (with meals), Disp: 28 tablet, Rfl: 0     penciclovir (DENAVIR) 1 % external cream, Apply 1 g topically every 2 hours, Disp: , Rfl:     Current Facility-Administered Medications:      botulinum toxin type A (BOTOX) 100 units injection 300 Units, 300 Units, Intramuscular, Q90 Days, Virginia Rosas MD, 200 Units at 23 1001       BOTULINUM NEUROTOXIN INJECTION PROCEDURES    VERIFICATION OF PATIENT IDENTIFICATION AND PROCEDURE     Initials   Patient Name SES   Patient  SES   Procedure Verified by: SES     Prior to the start of the procedure and with procedural staff participation, I verbally confirmed the patient s identity using two indicators, relevant allergies, that the procedure was appropriate and matched the consent or emergent situation, and that the correct equipment/implants were available. Immediately prior to starting the procedure I conducted the Time Out with the procedural staff and re-confirmed the patient s name, procedure, and site/side. (The Joint Commission universal protocol was followed.)  Yes    Sedation (Moderate or Deep): None    ABOVE ASSESSMENTS PERFORMED BY    Virginia Rosas MD      INDICATIONS FOR PROCEDURES  Kendra Vaca is a 63  year old patient with a history of involuntary spasms of the neck and shoulder muscles with tremor who presents to clinic for botulinum toxin injections for management of cervical dystonia.  She is here today for reinjection with Botox.    GOAL OF PROCEDURE  The goal of this procedure is to increase active range of motion, improve volitional motor control, decrease pain  and enhance functional independence.      TOTAL DOSE ADMINISTERED  Dose Administered:  200 units  Botox (Botulinum Toxin Type A)       1:1 Dilution   Unavoidable Drug Waste: No  Diluent Used:  Preservative Free Normal Saline  Total Volume of Diluent Used:  2 ml  NDC #: Botox 100u ([address]-83)      CONSENT  The risks, benefits, and treatment options were discussed with Kendra Vaca and she agreed to proceed.    Written consent was obtained by dejon.     EQUIPMENT USED  Needle-37mm stimulating/recording  EMG/NCS Machine    SKIN PREPARATION  Skin preparation was performed using an alcohol wipe.    GUIDANCE DESCRIPTION  Electro-myographic guidance was necessary throughout the procedure to accurately identify all areas of dystonic muscles while avoiding injection of non-dystonic muscles, neighboring nerves and nearby vascular structures.       AREA/MUSCLE INJECTED:  200 UNITS BOTOX = TOTAL DOSE, 1:1 DILUTION    1. HEAD & NECK MUSCLES: 200 UNITS BOTOX = TOTAL DOSE     Right Mid-Trapezius - 20 units of Botox at 1 site/s.   Left Mid-Trapezius - 20 units of Botox at 1 site/s.      Right lateral Trapezius - 30 units of Botox at 1 site.  Left lateral Trapezius - 10 units of Botox at 1 site.      Right Splenius Cervicis - 30 units of Botox at 1 site/s.   Left Splenius Cervicis - 20 units of Botox at 1 site/s.      Right Levator Scapulae - 20 units of Botox at 1 site/s (shoulder muscles).   Left Levator Scapulae - 20 units of Botox at 1 site/s (shoulder muscles).                  Right Sternocleidomastoid - 20 units of Botox at 1  site/s.               Left Sternocleidomastoid - 10 units of Botox at 1 site/s.       RESPONSE TO PROCEDURE  Kendra Vaca tolerated the procedure well and there were no immediate complications. She was allowed to recover for an appropriate period of time and was discharged home in stable condition.    ASSESSMENT AND PLAN   1. Botulinum toxin injections: No changes made to Botox dose or distribution today. Patient will continue to monitor response and report at next appointment.   2. Referrals: None.   3. Follow up: Kendra Vaca was rescheduled for the next series of injections in 12 weeks, at which time we will evaluate response to today's injections. she may call the clinic prior with any questions or concerns prior to the next appointment.       Again, thank you for allowing me to participate in the care of your patient.        Sincerely,        Virginia Rosas MD

## 2023-07-20 NOTE — NURSING NOTE
Chief Complaint   Patient presents with     Procedure     Alyxox     Zhanna Low MA on 7/20/2023 at 10:29 AM

## 2023-07-20 NOTE — PROGRESS NOTES
[facility]    PM&R CLINIC NOTE  BOTULINUM TOXIN PROCEDURE      HPI  Chief Complaint   Patient presents with     Procedure     Botox     Kendra Vaca is a 63 year old female with a history of involuntary spasms of the neck and shoulder muscles with tremor who presents to clinic for botulinum toxin injections for management of cervical dystonia.     SINCE LAST VISIT  Kendra Vaca was last seen here in clinic on 4/27/2023, at which time she received 200 units of Botox.    Patient denies new medical diagnoses, illnesses, hospitalizations, emergency room visits, and injuries since the previous injection with botulinum neurotoxin.     RESPONSE TO PREVIOUS TREATMENT    Side effects: No problems reported    Pain Improvement: Yes.  Percent Improvement: 90 %    Duration of Benefit:  10 weeks and followed by a gradual reduction in benefit    Dystonia Improvement: Yes.  Percent Improvement: 90 %    Duration of Benefit:  10 weeks and followed by a gradual reduction in benefit. She has noticed a return of tremors and dystonic pulling over the last week and a half. She spilled her coffee on herself this morning due to severe tremors.        PHYSICAL EXAM  VS: [facility] 02/15/2012    GEN: Pleasant and cooperative, in no acute distress  HEENT: No facial asymmetry  HEAD, NECK AND TRUNK PATTERN:   Continuous multidirectional tremor  Right rotation  Left side bending      ALLERGIES  Allergies   Allergen Reactions     Ciprofloxacin Anaphylaxis and Other (See Comments)     Throat swelling       Morphine Sulfate Hives     Seafood Hives     Shellfish-Derived Products Anaphylaxis     Latex      CONTACT RASH WITH LATEX PRODUCTS IN THE HEAT OF SUMMER       CURRENT MEDICATIONS    Current Outpatient Medications:      acetaminophen (TYLENOL) 500 MG tablet, Take 500-1,000 mg by mouth every 6 hours as needed for mild pain (can take 4 per day), Disp: , Rfl:      azelastine (OPTIVAR) 0.05 % ophthalmic solution,  Apply 1 drop to eye, Disp: , Rfl:      azelastine (OPTIVAR) 0.05 % SOLN, Place 1 drop into both eyes as needed , Disp: , Rfl:      EPINEPHrine (EPIPEN JR) 0.15 MG/0.3ML injection, Inject 0.15 mg into the muscle as needed for anaphylaxis, Disp: , Rfl:      ibuprofen (ADVIL/MOTRIN) 800 MG tablet, Take 800 mg by mouth, Disp: , Rfl:      ketoconazole (NIZORAL) 2 % external cream, , Disp: , Rfl:      naproxen (NAPROSYN) 500 MG tablet, Take 1 tablet (500 mg) by mouth 2 times daily (with meals), Disp: 28 tablet, Rfl: 0     penciclovir (DENAVIR) 1 % external cream, Apply 1 g topically every 2 hours, Disp: , Rfl:     Current Facility-Administered Medications:      botulinum toxin type A (BOTOX) 100 units injection 300 Units, 300 Units, Intramuscular, Q90 Days, Virginia Rosas MD, 200 Units at 23 1001       BOTULINUM NEUROTOXIN INJECTION PROCEDURES    VERIFICATION OF PATIENT IDENTIFICATION AND PROCEDURE     Initials   Patient Name SES   Patient  SES   Procedure Verified by: SES     Prior to the start of the procedure and with procedural staff participation, I verbally confirmed the patient s identity using two indicators, relevant allergies, that the procedure was appropriate and matched the consent or emergent situation, and that the correct equipment/implants were available. Immediately prior to starting the procedure I conducted the Time Out with the procedural staff and re-confirmed the patient s name, procedure, and site/side. 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  She is here today for reinjection with Botox.    GOAL OF PROCEDURE  The goal  of this procedure is to increase active range of motion, improve volitional motor control, decrease pain  and enhance functional independence.      TOTAL DOSE ADMINISTERED  Dose Administered:  200 units  Botox (Botulinum Toxin Type A)       1:1 Dilution   Unavoidable Drug Waste: No  Diluent Used:  Preservative Free Normal Saline  Total Volume of Diluent Used:  2 ml  NDC #: Botox 100u ([address]-10)      CONSENT  The risks, benefits, and treatment options were discussed with Kendra Vaca and she agreed to proceed.    Written consent was obtained by dejon.     EQUIPMENT USED  Needle-37mm stimulating/recording  EMG/NCS Machine    SKIN PREPARATION  Skin preparation was performed using an alcohol wipe.    GUIDANCE DESCRIPTION  Electro-myographic guidance was necessary throughout the procedure to accurately identify all areas of dystonic muscles while avoiding injection of non-dystonic muscles, neighboring nerves and nearby vascular structures.       AREA/MUSCLE INJECTED:  200 UNITS BOTOX = TOTAL DOSE, 1:1 DILUTION    1. HEAD & NECK MUSCLES: 200 UNITS BOTOX = TOTAL DOSE     Right Mid-Trapezius - 20 units of Botox at 1 site/s.   Left Mid-Trapezius - 20 units of Botox at 1 site/s.      Right lateral Trapezius - 30 units of Botox at 1 site.  Left lateral Trapezius - 10 units of Botox at 1 site.      Right Splenius Cervicis - 30 units of Botox at 1 site/s.   Left Splenius Cervicis - 20 units of Botox at 1 site/s.      Right Levator Scapulae - 20 units of Botox at 1 site/s (shoulder muscles).   Left Levator Scapulae - 20 units of Botox at 1 site/s (shoulder muscles).                  Right Sternocleidomastoid - 20 units of Botox at 1 site/s.               Left Sternocleidomastoid - 10 units of Botox at 1 site/s.       RESPONSE TO PROCEDURE  Kendra Vaca tolerated the procedure well and there were no immediate complications. She was allowed to recover for an appropriate period of time and was discharged home in  stable condition.    ASSESSMENT AND PLAN   1. Botulinum toxin injections: No changes made to Botox dose or distribution today. Patient will continue to monitor response and report at next appointment.   2. Referrals: None.   3. Follow up: Kendra Vaca was rescheduled for the next series of injections in 12 weeks, at which time we will evaluate response to today's injections. she may call the clinic prior with any questions or concerns prior to the next appointment.

## 2023-08-07 ENCOUNTER — TRANSFERRED RECORDS (OUTPATIENT)
Dept: HEALTH INFORMATION MANAGEMENT | Facility: CLINIC | Age: 63
End: 2023-08-07
Payer: MEDICARE

## 2023-08-26 ENCOUNTER — NURSE TRIAGE (OUTPATIENT)
Dept: NURSING | Facility: CLINIC | Age: 63
End: 2023-08-26
Payer: MEDICARE

## 2023-08-27 NOTE — TELEPHONE ENCOUNTER
Nurse Triage SBAR    Is this a 2nd Level Triage? NO    Situation: Right  Flank Pain    Background: :Patient had a catheter placed on 8/7/2023.    Assessment: Patient reports severe right flank pain with onset at 1400. She reports nausea. She denies urinary symptoms or fever.  She reports her last void was 3 hours ago.    Protocol Recommended Disposition:   According to the protocol, patient should go to ED now.  Care advice given. Patient verbalizes understanding and agrees with plan of care. Patient plans to go to ED tonight.    Virginia Partida RN  08/26/23 11:51 PM  [facility] Nurse Advisor      Reason for Disposition   [1] SEVERE pain (e.g., excruciating, scale 8-10) AND [2] present > 1 hour    Additional Information   Negative: Passed out (i.e., lost consciousness, collapsed and was not responding)   Negative: Shock suspected (e.g., cold/pale/clammy skin, too weak to stand, low BP, rapid pulse)   Negative: Difficult to awaken or acting confused (e.g., disoriented, slurred speech)   Negative: Sounds like a life-threatening emergency to the triager   Negative: Followed a major injury to the back (e.g., MVA, fall > 10 feet or 3 meters, penetrating injury, etc.)   Negative: Back pain or flank pain during pregnancy   Negative: Upper, mid or lower back pain that occurs mainly in the midline    Protocols used: Flank Pain-A-

## 2023-10-12 ENCOUNTER — OFFICE VISIT (OUTPATIENT)
Dept: PHYSICAL MEDICINE AND REHAB | Facility: CLINIC | Age: 63
End: 2023-10-12
Payer: MEDICARE

## 2023-10-12 VITALS — DIASTOLIC BLOOD PRESSURE: 81 MMHG | SYSTOLIC BLOOD PRESSURE: 133 MMHG

## 2023-10-12 DIAGNOSIS — G24.3 SPASMODIC TORTICOLLIS: Primary | ICD-10-CM

## 2023-10-12 DIAGNOSIS — G24.1 GENETIC TORSION DYSTONIA: ICD-10-CM

## 2023-10-12 PROCEDURE — 64642 CHEMODENERV 1 EXTREMITY 1-4: CPT | Performed by: PHYSICAL MEDICINE & REHABILITATION

## 2023-10-12 PROCEDURE — 64643 CHEMODENERV 1 EXTREM 1-4 EA: CPT | Performed by: PHYSICAL MEDICINE & REHABILITATION

## 2023-10-12 PROCEDURE — 64616 CHEMODENERV MUSC NECK DYSTON: CPT | Mod: 50 | Performed by: PHYSICAL MEDICINE & REHABILITATION

## 2023-10-12 PROCEDURE — 95874 GUIDE NERV DESTR NEEDLE EMG: CPT | Performed by: PHYSICAL MEDICINE & REHABILITATION

## 2023-10-12 RX ORDER — VIBEGRON 75 MG/1
TABLET, FILM COATED ORAL
COMMUNITY
Start: 2023-09-21

## 2023-10-12 NOTE — LETTER
10/12/2023         RE: Kendra Vaca  [address] [facility] [id number]        Dear Colleague,    Thank you for referring your patient, Kendra Vaca, to the [facility]. Please see a copy of my visit note below.      [facility]    PM&R CLINIC NOTE  BOTULINUM TOXIN PROCEDURE      HPI  Chief Complaint   Patient presents with     Procedure     Botox       Kendra Vaca is a 63 year old female with a history of involuntary spasms of the neck and shoulder muscles with tremor who presents to clinic for botulinum toxin injections for management of cervical dystonia.     SINCE LAST VISIT  Kendra Vaca was last seen here in clinic on 7/20/2023, at which time she received 200 units of Botox.    Patient denies new medical diagnoses, illnesses, hospitalizations, emergency room visits, and injuries since the previous injection with botulinum neurotoxin.     RESPONSE TO PREVIOUS TREATMENT    Side effects: No problems reported    Pain Improvement: Yes.  Percent Improvement: 90 %    Duration of Benefit:   10 weeks and followed by a gradual reduction in benefit    Dystonia Improvement: Yes.  Percent Improvement: 90 %    Duration of Benefit:   10 weeks and followed by a gradual reduction in benefit.       PHYSICAL EXAM  VS: /81 (BP Location: Right arm, Patient Position: Sitting)   LMP 02/15/2012    GEN: Pleasant and cooperative, in no acute distress  HEENT: No facial asymmetry  HEAD, NECK AND TRUNK PATTERN:   Continuous multidirectional tremor  Right rotation  Left side bending      ALLERGIES  Allergies   Allergen Reactions     Ciprofloxacin Anaphylaxis and Other (See Comments)     Throat swelling       Morphine Sulfate Hives     Seafood Hives     Shellfish-Derived Products Anaphylaxis     Latex      CONTACT RASH WITH LATEX PRODUCTS IN THE HEAT OF SUMMER       CURRENT MEDICATIONS    Current Outpatient Medications:      acetaminophen  (TYLENOL) 500 MG tablet, Take 500-1,000 mg by mouth every 6 hours as needed for mild pain (can take 4 per day), Disp: , Rfl:      azelastine (OPTIVAR) 0.05 % ophthalmic solution, Apply 1 drop to eye, Disp: , Rfl:      azelastine (OPTIVAR) 0.05 % SOLN, Place 1 drop into both eyes as needed , Disp: , Rfl:      EPINEPHrine (EPIPEN JR) 0.15 MG/0.3ML injection, Inject 0.15 mg into the muscle as needed for anaphylaxis, Disp: , Rfl:      GEMTESA 75 MG TABS tablet, Take 1 tablet every day by oral route., Disp: , Rfl:      ibuprofen (ADVIL/MOTRIN) 800 MG tablet, Take 800 mg by mouth, Disp: , Rfl:      ketoconazole (NIZORAL) 2 % external cream, , Disp: , Rfl:      naproxen (NAPROSYN) 500 MG tablet, Take 1 tablet (500 mg) by mouth 2 times daily (with meals), Disp: 28 tablet, Rfl: 0     penciclovir (DENAVIR) 1 % external cream, Apply 1 g topically every 2 hours, Disp: , Rfl:     Current Facility-Administered Medications:      botulinum toxin type A (BOTOX) 100 units injection 300 Units, 300 Units, Intramuscular, Q90 Days, Virginia Rosas MD, 200 Units at 23 1129       BOTULINUM NEUROTOXIN INJECTION PROCEDURES    VERIFICATION OF PATIENT IDENTIFICATION AND PROCEDURE     Initials   Patient Name SES   Patient  SES   Procedure Verified by: SES     Prior to the start of the procedure and with procedural staff participation, I verbally confirmed the patient s identity using two indicators, relevant allergies, that the procedure was appropriate and matched the consent or emergent situation, and that the correct equipment/implants were available. Immediately prior to starting the procedure I conducted the Time Out with the procedural staff and re-confirmed the patient s name, procedure, and site/side. (The Joint Commission universal protocol was followed.)  Yes    Sedation (Moderate or Deep): None    ABOVE ASSESSMENTS PERFORMED BY    Virginia Rosas MD      INDICATIONS FOR PROCEDURES  Kendra Vaca is a 63  year old patient with a history of involuntary spasms of the neck and shoulder muscles with tremor who presents to clinic for botulinum toxin injections for management of cervical dystonia.  She is here today for reinjection with Botox.    GOAL OF PROCEDURE  The goal of this procedure is to increase active range of motion, improve volitional motor control, decrease pain  and enhance functional independence.      TOTAL DOSE ADMINISTERED  Dose Administered:  200 units  Botox (Botulinum Toxin Type A)       1:1 Dilution   Unavoidable Drug Waste: No  Diluent Used:  Preservative Free Normal Saline  Total Volume of Diluent Used:  2 ml  NDC #: Botox 100u ([address]-25)      CONSENT  The risks, benefits, and treatment options were discussed with Kendra Vaca and she agreed to proceed.    Written consent was obtained by  dejon .     EQUIPMENT USED  Needle-37mm stimulating/recording  EMG/NCS Machine    SKIN PREPARATION  Skin preparation was performed using an alcohol wipe.    GUIDANCE DESCRIPTION  Electro-myographic guidance was necessary throughout the procedure to accurately identify all areas of dystonic muscles while avoiding injection of non-dystonic muscles, neighboring nerves and nearby vascular structures.       AREA/MUSCLE INJECTED:  200 UNITS BOTOX = TOTAL DOSE, 1:1 DILUTION    1. HEAD & NECK MUSCLES: 200 UNITS BOTOX = TOTAL DOSE     Right Mid-Trapezius - 20 units of Botox at 1 site/s.   Left Mid-Trapezius - 20 units of Botox at 1 site/s.      Right lateral Trapezius - 30 units of Botox at 1 site.  Left lateral Trapezius - 10 units of Botox at 1 site.      Right Splenius Cervicis - 30 units of Botox at 1 site/s.   Left Splenius Cervicis - 20 units of Botox at 1 site/s.      Right Levator Scapulae - 20 units of Botox at 1 site/s (shoulder muscles).   Left Levator Scapulae - 20 units of Botox at 1 site/s (shoulder muscles).                  Right Sternocleidomastoid - 20 units of Botox at 1 site/s.                Left Sternocleidomastoid - 10 units of Botox at 1 site/s.       RESPONSE TO PROCEDURE  Kendra Vaca tolerated the procedure well and there were no immediate complications. She was allowed to recover for an appropriate period of time and was discharged home in stable condition.    ASSESSMENT AND PLAN   Botulinum toxin injections: No changes made to Botox dose or distribution today. Patient will continue to monitor response and report at next appointment.   Referrals: None.   Follow up: Kendra Vaca was rescheduled for the next series of injections in 12 weeks, at which time we will evaluate response to today's injections. she may call the clinic prior with any questions or concerns prior to the next appointment.       Again, thank you for allowing me to participate in the care of your patient.        Sincerely,        Virginia Rosas MD

## 2023-10-12 NOTE — PROGRESS NOTES
[facility]    PM&R CLINIC NOTE  BOTULINUM TOXIN PROCEDURE      HPI  Chief Complaint   Patient presents with    Procedure     Botox       Kendra Vaca is a 63 year old female with a history of involuntary spasms of the neck and shoulder muscles with tremor who presents to clinic for botulinum toxin injections for management of cervical dystonia.     SINCE LAST VISIT  Kendra Vaca was last seen here in clinic on 7/20/2023, at which time she received 200 units of Botox.    Patient denies new medical diagnoses, illnesses, hospitalizations, emergency room visits, and injuries since the previous injection with botulinum neurotoxin.     RESPONSE TO PREVIOUS TREATMENT    Side effects: No problems reported    Pain Improvement: Yes.  Percent Improvement: 90 %    Duration of Benefit:   10 weeks and followed by a gradual reduction in benefit    Dystonia Improvement: Yes.  Percent Improvement: 90 %    Duration of Benefit:   10 weeks and followed by a gradual reduction in benefit.       PHYSICAL EXAM  VS: /81 (BP Location: Right arm, Patient Position: Sitting)   LMP 02/15/2012    GEN: Pleasant and cooperative, in no acute distress  HEENT: No facial asymmetry  HEAD, NECK AND TRUNK PATTERN:   Continuous multidirectional tremor  Right rotation  Left side bending      ALLERGIES  Allergies   Allergen Reactions    Ciprofloxacin Anaphylaxis and Other (See Comments)     Throat swelling      Morphine Sulfate Hives    Seafood Hives    Shellfish-Derived Products Anaphylaxis    Latex      CONTACT RASH WITH LATEX PRODUCTS IN THE HEAT OF SUMMER       CURRENT MEDICATIONS    Current Outpatient Medications:     acetaminophen (TYLENOL) 500 MG tablet, Take 500-1,000 mg by mouth every 6 hours as needed for mild pain (can take 4 per day), Disp: , Rfl:     azelastine (OPTIVAR) 0.05 % ophthalmic solution, Apply 1 drop to eye, Disp: , Rfl:     azelastine (OPTIVAR) 0.05 % SOLN, Place 1 drop into both eyes  as needed , Disp: , Rfl:     EPINEPHrine (EPIPEN JR) 0.15 MG/0.3ML injection, Inject 0.15 mg into the muscle as needed for anaphylaxis, Disp: , Rfl:     GEMTESA 75 MG TABS tablet, Take 1 tablet every day by oral route., Disp: , Rfl:     ibuprofen (ADVIL/MOTRIN) 800 MG tablet, Take 800 mg by mouth, Disp: , Rfl:     ketoconazole (NIZORAL) 2 % external cream, , Disp: , Rfl:     naproxen (NAPROSYN) 500 MG tablet, Take 1 tablet (500 mg) by mouth 2 times daily (with meals), Disp: 28 tablet, Rfl: 0    penciclovir (DENAVIR) 1 % external cream, Apply 1 g topically every 2 hours, Disp: , Rfl:     Current Facility-Administered Medications:     botulinum toxin type A (BOTOX) 100 units injection 300 Units, 300 Units, Intramuscular, Q90 Days, Virginia Rosas MD, 200 Units at 23 1129       BOTULINUM NEUROTOXIN INJECTION PROCEDURES    VERIFICATION OF PATIENT IDENTIFICATION AND PROCEDURE     Initials   Patient Name SES   Patient  SES   Procedure Verified by: SES     Prior to the start of the procedure and with procedural staff participation, I verbally confirmed the patient s identity using two indicators, relevant allergies, that the procedure was appropriate and matched the consent or emergent situation, and that the correct equipment/implants were available. Immediately prior to starting the procedure I conducted the Time Out with the procedural staff and re-confirmed the patient s name, procedure, and site/side. 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  She is here today for reinjection with Botox.    GOAL OF PROCEDURE  The goal of this procedure is  to increase active range of motion, improve volitional motor control, decrease pain  and enhance functional independence.      TOTAL DOSE ADMINISTERED  Dose Administered:  200 units  Botox (Botulinum Toxin Type A)       1:1 Dilution   Unavoidable Drug Waste: No  Diluent Used:  Preservative Free Normal Saline  Total Volume of Diluent Used:  2 ml  NDC #: Botox 100u ([address]-07)      CONSENT  The risks, benefits, and treatment options were discussed with Kendra Vaca and she agreed to proceed.    Written consent was obtained by  dejon .     EQUIPMENT USED  Needle-37mm stimulating/recording  EMG/NCS Machine    SKIN PREPARATION  Skin preparation was performed using an alcohol wipe.    GUIDANCE DESCRIPTION  Electro-myographic guidance was necessary throughout the procedure to accurately identify all areas of dystonic muscles while avoiding injection of non-dystonic muscles, neighboring nerves and nearby vascular structures.       AREA/MUSCLE INJECTED:  200 UNITS BOTOX = TOTAL DOSE, 1:1 DILUTION    1. HEAD & NECK MUSCLES: 200 UNITS BOTOX = TOTAL DOSE     Right Mid-Trapezius - 20 units of Botox at 1 site/s.   Left Mid-Trapezius - 20 units of Botox at 1 site/s.      Right lateral Trapezius - 30 units of Botox at 1 site.  Left lateral Trapezius - 10 units of Botox at 1 site.      Right Splenius Cervicis - 30 units of Botox at 1 site/s.   Left Splenius Cervicis - 20 units of Botox at 1 site/s.      Right Levator Scapulae - 20 units of Botox at 1 site/s (shoulder muscles).   Left Levator Scapulae - 20 units of Botox at 1 site/s (shoulder muscles).                  Right Sternocleidomastoid - 20 units of Botox at 1 site/s.               Left Sternocleidomastoid - 10 units of Botox at 1 site/s.       RESPONSE TO PROCEDURE  Kendra Vaca tolerated the procedure well and there were no immediate complications. She was allowed to recover for an appropriate period of time and was discharged home in stable  condition.    ASSESSMENT AND PLAN   Botulinum toxin injections: No changes made to Botox dose or distribution today. Patient will continue to monitor response and report at next appointment.   Referrals: None.   Follow up: Kendra Vaca was rescheduled for the next series of injections in 12 weeks, at which time we will evaluate response to today's injections. she may call the clinic prior with any questions or concerns prior to the next appointment.

## 2023-12-04 DIAGNOSIS — G24.1 GENETIC TORSION DYSTONIA: ICD-10-CM

## 2023-12-04 DIAGNOSIS — G24.3 SPASMODIC TORTICOLLIS: Primary | ICD-10-CM

## 2024-01-11 ENCOUNTER — OFFICE VISIT (OUTPATIENT)
Dept: PHYSICAL MEDICINE AND REHAB | Facility: CLINIC | Age: 64
End: 2024-01-11
Payer: MEDICARE

## 2024-01-11 VITALS — HEART RATE: 85 BPM | SYSTOLIC BLOOD PRESSURE: 125 MMHG | DIASTOLIC BLOOD PRESSURE: 86 MMHG

## 2024-01-11 DIAGNOSIS — G24.1 GENETIC TORSION DYSTONIA: ICD-10-CM

## 2024-01-11 DIAGNOSIS — G24.3 SPASMODIC TORTICOLLIS: Primary | ICD-10-CM

## 2024-01-11 PROCEDURE — 64642 CHEMODENERV 1 EXTREMITY 1-4: CPT | Performed by: PHYSICAL MEDICINE & REHABILITATION

## 2024-01-11 PROCEDURE — 95874 GUIDE NERV DESTR NEEDLE EMG: CPT | Performed by: PHYSICAL MEDICINE & REHABILITATION

## 2024-01-11 PROCEDURE — 64643 CHEMODENERV 1 EXTREM 1-4 EA: CPT | Performed by: PHYSICAL MEDICINE & REHABILITATION

## 2024-01-11 PROCEDURE — 64616 CHEMODENERV MUSC NECK DYSTON: CPT | Mod: 50 | Performed by: PHYSICAL MEDICINE & REHABILITATION

## 2024-01-11 NOTE — PROGRESS NOTES
[facility]    PM&R CLINIC NOTE  BOTULINUM TOXIN PROCEDURE      HPI  Chief Complaint   Patient presents with    Procedure     Botox     Kendra Vaca is a 63 year old female with a history of involuntary spasms of the neck and shoulder muscles with tremor who presents to clinic for botulinum toxin injections for management of cervical dystonia.     SINCE LAST VISIT  Kendra Vaca was last seen here in clinic on 10/12/2023, at which time she received 200 units of Botox.    Patient reports the following new medical problems since last visit: She is going to be having a right ankle fusion 1/29/2024. She also recently had influenza A, and is now doing much better.      RESPONSE TO PREVIOUS TREATMENT    Side effects: No problems reported    Pain Improvement: Yes.  Percent Improvement: 90 %    Duration of Benefit:   10 weeks and followed by a gradual reduction in benefit    Dystonia Improvement: Yes.  Percent Improvement: 90 %    Duration of Benefit:   10 weeks and followed by a gradual reduction in benefit.       PHYSICAL EXAM  VS: /86 (BP Location: Right arm, Patient Position: Sitting)   Pulse 85   LMP 02/15/2012    GEN: Pleasant and cooperative, in no acute distress  HEENT: No facial asymmetry  HEAD, NECK AND TRUNK PATTERN:   Continuous multidirectional tremor  Right rotation  Left side bending      ALLERGIES  Allergies   Allergen Reactions    Ciprofloxacin Anaphylaxis and Other (See Comments)     Throat swelling      Morphine Sulfate Hives    Seafood Hives    Shellfish-Derived Products Anaphylaxis    Latex      CONTACT RASH WITH LATEX PRODUCTS IN THE HEAT OF SUMMER       CURRENT MEDICATIONS    Current Outpatient Medications:     acetaminophen (TYLENOL) 500 MG tablet, Take 500-1,000 mg by mouth every 6 hours as needed for mild pain (can take 4 per day), Disp: , Rfl:     azelastine (OPTIVAR) 0.05 % ophthalmic solution, Apply 1 drop to eye, Disp: , Rfl:     azelastine  (OPTIVAR) 0.05 % SOLN, Place 1 drop into both eyes as needed , Disp: , Rfl:     EPINEPHrine (EPIPEN JR) 0.15 MG/0.3ML injection, Inject 0.15 mg into the muscle as needed for anaphylaxis, Disp: , Rfl:     GEMTESA 75 MG TABS tablet, Take 1 tablet every day by oral route., Disp: , Rfl:     ibuprofen (ADVIL/MOTRIN) 800 MG tablet, Take 800 mg by mouth, Disp: , Rfl:     ketoconazole (NIZORAL) 2 % external cream, , Disp: , Rfl:     naproxen (NAPROSYN) 500 MG tablet, Take 1 tablet (500 mg) by mouth 2 times daily (with meals), Disp: 28 tablet, Rfl: 0    penciclovir (DENAVIR) 1 % external cream, Apply 1 g topically every 2 hours, Disp: , Rfl:     Current Facility-Administered Medications:     botulinum toxin type A (BOTOX) 100 units injection 300 Units, 300 Units, Intramuscular, Q90 Days, Virginia Rosas MD       BOTULINUM NEUROTOXIN INJECTION PROCEDURES    VERIFICATION OF PATIENT IDENTIFICATION AND PROCEDURE     Initials   Patient Name SES   Patient  SES   Procedure Verified by: SES     Prior to the start of the procedure and with procedural staff participation, I verbally confirmed the patient s identity using two indicators, relevant allergies, that the procedure was appropriate and matched the consent or emergent situation, and that the correct equipment/implants were available. Immediately prior to starting the procedure I conducted the Time Out with the procedural staff and re-confirmed the patient s name, procedure, and site/side. 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  She is here today for reinjection with Botox.    GOAL OF PROCEDURE  The goal  of this procedure is to increase active range of motion, improve volitional motor control, decrease pain  and enhance functional independence.      TOTAL DOSE ADMINISTERED  Dose Administered:  200 units  Botox (Botulinum Toxin Type A)       1:1 Dilution   Unavoidable Drug Waste: No  Diluent Used:  Preservative Free Normal Saline  Total Volume of Diluent Used:  2 ml  NDC #: Botox 100u ([address]-27)      CONSENT  The risks, benefits, and treatment options were discussed with Kendra Vaca and she agreed to proceed.    Written consent was obtained by  dejon .     EQUIPMENT USED  Needle-37mm stimulating/recording  EMG/NCS Machine    SKIN PREPARATION  Skin preparation was performed using an alcohol wipe.    GUIDANCE DESCRIPTION  Electro-myographic guidance was necessary throughout the procedure to accurately identify all areas of dystonic muscles while avoiding injection of non-dystonic muscles, neighboring nerves and nearby vascular structures.       AREA/MUSCLE INJECTED:  200 UNITS BOTOX = TOTAL DOSE, 1:1 DILUTION    1. HEAD & NECK MUSCLES: 200 UNITS BOTOX = TOTAL DOSE     Right Mid-Trapezius - 20 units of Botox at 1 site/s.   Left Mid-Trapezius - 20 units of Botox at 1 site/s.      Right lateral Trapezius - 30 units of Botox at 1 site.  Left lateral Trapezius - 10 units of Botox at 1 site.      Right Splenius Cervicis - 30 units of Botox at 1 site/s.   Left Splenius Cervicis - 20 units of Botox at 1 site/s.      Right Levator Scapulae - 20 units of Botox at 1 site/s (shoulder muscles).   Left Levator Scapulae - 20 units of Botox at 1 site/s (shoulder muscles).                  Right Sternocleidomastoid - 20 units of Botox at 1 site/s.               Left Sternocleidomastoid - 10 units of Botox at 1 site/s.       RESPONSE TO PROCEDURE  Kendra Vaca tolerated the procedure well and there were no immediate complications. She was allowed to recover for an appropriate period of time and was discharged home in  stable condition.    ASSESSMENT AND PLAN   Botulinum toxin injections: No changes made to Botox dose or distribution today. Patient will continue to monitor response and report at next appointment.   Referrals: None.   Follow up: Kendra Vaca was rescheduled for the next series of injections in 12 weeks, at which time we will evaluate response to today's injections. she may call the clinic prior with any questions or concerns prior to the next appointment.

## 2024-01-11 NOTE — LETTER
1/11/2024         RE: Kendra Vaca  [address] [facility] [address]        Dear Colleague,    Thank you for referring your patient, Kendra Vaca, to the [facility]. Please see a copy of my visit note below.      [facility]    PM&R CLINIC NOTE  BOTULINUM TOXIN PROCEDURE      HPI  Chief Complaint   Patient presents with     Procedure     Botox     Kendra Vaca is a 63 year old female with a history of involuntary spasms of the neck and shoulder muscles with tremor who presents to clinic for botulinum toxin injections for management of cervical dystonia.     SINCE LAST VISIT  Kendra Vaca was last seen here in clinic on 10/12/2023, at which time she received 200 units of Botox.    Patient reports the following new medical problems since last visit: She is going to be having a right ankle fusion 1/29/2024. She also recently had influenza A, and is now doing much better.      RESPONSE TO PREVIOUS TREATMENT    Side effects: No problems reported    Pain Improvement: Yes.  Percent Improvement: 90 %    Duration of Benefit:   10 weeks and followed by a gradual reduction in benefit    Dystonia Improvement: Yes.  Percent Improvement: 90 %    Duration of Benefit:   10 weeks and followed by a gradual reduction in benefit.       PHYSICAL EXAM  VS: /86 (BP Location: Right arm, Patient Position: Sitting)   Pulse 85   LMP 02/15/2012    GEN: Pleasant and cooperative, in no acute distress  HEENT: No facial asymmetry  HEAD, NECK AND TRUNK PATTERN:   Continuous multidirectional tremor  Right rotation  Left side bending      ALLERGIES  Allergies   Allergen Reactions     Ciprofloxacin Anaphylaxis and Other (See Comments)     Throat swelling       Morphine Sulfate Hives     Seafood Hives     Shellfish-Derived Products Anaphylaxis     Latex      CONTACT RASH WITH LATEX PRODUCTS IN THE HEAT OF SUMMER       CURRENT MEDICATIONS    Current  Outpatient Medications:      acetaminophen (TYLENOL) 500 MG tablet, Take 500-1,000 mg by mouth every 6 hours as needed for mild pain (can take 4 per day), Disp: , Rfl:      azelastine (OPTIVAR) 0.05 % ophthalmic solution, Apply 1 drop to eye, Disp: , Rfl:      azelastine (OPTIVAR) 0.05 % SOLN, Place 1 drop into both eyes as needed , Disp: , Rfl:      EPINEPHrine (EPIPEN JR) 0.15 MG/0.3ML injection, Inject 0.15 mg into the muscle as needed for anaphylaxis, Disp: , Rfl:      GEMTESA 75 MG TABS tablet, Take 1 tablet every day by oral route., Disp: , Rfl:      ibuprofen (ADVIL/MOTRIN) 800 MG tablet, Take 800 mg by mouth, Disp: , Rfl:      ketoconazole (NIZORAL) 2 % external cream, , Disp: , Rfl:      naproxen (NAPROSYN) 500 MG tablet, Take 1 tablet (500 mg) by mouth 2 times daily (with meals), Disp: 28 tablet, Rfl: 0     penciclovir (DENAVIR) 1 % external cream, Apply 1 g topically every 2 hours, Disp: , Rfl:     Current Facility-Administered Medications:      botulinum toxin type A (BOTOX) 100 units injection 300 Units, 300 Units, Intramuscular, Q90 Days, Virginia Rosas MD       BOTULINUM NEUROTOXIN INJECTION PROCEDURES    VERIFICATION OF PATIENT IDENTIFICATION AND PROCEDURE     Initials   Patient Name SES   Patient  SES   Procedure Verified by: SES     Prior to the start of the procedure and with procedural staff participation, I verbally confirmed the patient s identity using two indicators, relevant allergies, that the procedure was appropriate and matched the consent or emergent situation, and that the correct equipment/implants were available. Immediately prior to starting the procedure I conducted the Time Out with the procedural staff and re-confirmed the patient s name, procedure, and site/side. (The Joint Commission universal protocol was followed.)  Yes    Sedation (Moderate or Deep): None    ABOVE ASSESSMENTS PERFORMED BY    Virginia Rosas MD      INDICATIONS FOR PROCEDURES  Kendra MARLEY  Nola is a 63 year old patient with a history of involuntary spasms of the neck and shoulder muscles with tremor who presents to clinic for botulinum toxin injections for management of cervical dystonia.  She is here today for reinjection with Botox.    GOAL OF PROCEDURE  The goal of this procedure is to increase active range of motion, improve volitional motor control, decrease pain  and enhance functional independence.      TOTAL DOSE ADMINISTERED  Dose Administered:  200 units  Botox (Botulinum Toxin Type A)       1:1 Dilution   Unavoidable Drug Waste: No  Diluent Used:  Preservative Free Normal Saline  Total Volume of Diluent Used:  2 ml  NDC #: Botox 100u ([address]-12)      CONSENT  The risks, benefits, and treatment options were discussed with Kendra Vaca and she agreed to proceed.    Written consent was obtained by  dejon .     EQUIPMENT USED  Needle-37mm stimulating/recording  EMG/NCS Machine    SKIN PREPARATION  Skin preparation was performed using an alcohol wipe.    GUIDANCE DESCRIPTION  Electro-myographic guidance was necessary throughout the procedure to accurately identify all areas of dystonic muscles while avoiding injection of non-dystonic muscles, neighboring nerves and nearby vascular structures.       AREA/MUSCLE INJECTED:  200 UNITS BOTOX = TOTAL DOSE, 1:1 DILUTION    1. HEAD & NECK MUSCLES: 200 UNITS BOTOX = TOTAL DOSE     Right Mid-Trapezius - 20 units of Botox at 1 site/s.   Left Mid-Trapezius - 20 units of Botox at 1 site/s.      Right lateral Trapezius - 30 units of Botox at 1 site.  Left lateral Trapezius - 10 units of Botox at 1 site.      Right Splenius Cervicis - 30 units of Botox at 1 site/s.   Left Splenius Cervicis - 20 units of Botox at 1 site/s.      Right Levator Scapulae - 20 units of Botox at 1 site/s (shoulder muscles).   Left Levator Scapulae - 20 units of Botox at 1 site/s (shoulder muscles).                  Right Sternocleidomastoid - 20 units of Botox at 1  site/s.               Left Sternocleidomastoid - 10 units of Botox at 1 site/s.       RESPONSE TO PROCEDURE  Kendra Vaca tolerated the procedure well and there were no immediate complications. She was allowed to recover for an appropriate period of time and was discharged home in stable condition.    ASSESSMENT AND PLAN   Botulinum toxin injections: No changes made to Botox dose or distribution today. Patient will continue to monitor response and report at next appointment.   Referrals: None.   Follow up: Kendra Vaca was rescheduled for the next series of injections in 12 weeks, at which time we will evaluate response to today's injections. she may call the clinic prior with any questions or concerns prior to the next appointment.       Again, thank you for allowing me to participate in the care of your patient.        Sincerely,        Virginia Rosas MD

## 2024-01-11 NOTE — NURSING NOTE
Chief Complaint   Patient presents with    Procedure     Botox     Zhanna Low MA on 1/11/2024 at 10:13 AM

## 2024-04-04 ENCOUNTER — OFFICE VISIT (OUTPATIENT)
Dept: PHYSICAL MEDICINE AND REHAB | Facility: CLINIC | Age: 64
End: 2024-04-04
Payer: MEDICARE

## 2024-04-04 VITALS — DIASTOLIC BLOOD PRESSURE: 76 MMHG | SYSTOLIC BLOOD PRESSURE: 110 MMHG | HEART RATE: 99 BPM

## 2024-04-04 DIAGNOSIS — G24.3 SPASMODIC TORTICOLLIS: Primary | ICD-10-CM

## 2024-04-04 DIAGNOSIS — G24.1 GENETIC TORSION DYSTONIA: ICD-10-CM

## 2024-04-04 PROCEDURE — 95874 GUIDE NERV DESTR NEEDLE EMG: CPT | Performed by: PHYSICAL MEDICINE & REHABILITATION

## 2024-04-04 PROCEDURE — 64616 CHEMODENERV MUSC NECK DYSTON: CPT | Mod: 50 | Performed by: PHYSICAL MEDICINE & REHABILITATION

## 2024-04-04 NOTE — NURSING NOTE
Chief Complaint   Patient presents with    Procedure     Botox     Zhanna Low MA on 4/4/2024 at 9:50 AM

## 2024-04-04 NOTE — LETTER
4/4/2024         RE: Kendra Vaca  [address] [facility] [address]        Dear Colleague,    Thank you for referring your patient, Kendra Vaca, to the [facility]. Please see a copy of my visit note below.      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     SINCE LAST VISIT  Kendra Vaca was last seen here in clinic on 1/11/2024, at which time she received 200 units of Botox.    Patient reports the following new medical problems since last visit: She underwent a right ankle fusion on 1/29/2024 for severe arthritis and intractable pain, and this went well. She is currently in a walking boot.      RESPONSE TO PREVIOUS TREATMENT    Side effects: No problems reported    Pain Improvement: Yes.  Percent Improvement: 90 %    Duration of Benefit:   10 weeks and followed by a gradual reduction in benefit    Dystonia Improvement: Yes.  Percent Improvement: 90 %    Duration of Benefit:   10 weeks and followed by a gradual reduction in benefit.       PHYSICAL EXAM  VS: /76 (BP Location: Right arm, Patient Position: Sitting)   Pulse 99   LMP 02/15/2012    GEN: Pleasant and cooperative, in no acute distress  HEENT: No facial asymmetry  HEAD, NECK AND TRUNK PATTERN:   Continuous multidirectional tremor  Right rotation  Left side bending      ALLERGIES  Allergies   Allergen Reactions     Ciprofloxacin Anaphylaxis and Other (See Comments)     Throat swelling       Morphine Sulfate Hives     Seafood Hives     Shellfish-Derived Products Anaphylaxis     Latex      CONTACT RASH WITH LATEX PRODUCTS IN THE HEAT OF SUMMER       CURRENT  MEDICATIONS    Current Outpatient Medications:      acetaminophen (TYLENOL) 500 MG tablet, Take 500-1,000 mg by mouth every 6 hours as needed for mild pain (can take 4 per day), Disp: , Rfl:      azelastine (OPTIVAR) 0.05 % ophthalmic solution, Apply 1 drop to eye, Disp: , Rfl:      azelastine (OPTIVAR) 0.05 % SOLN, Place 1 drop into both eyes as needed , Disp: , Rfl:      EPINEPHrine (EPIPEN JR) 0.15 MG/0.3ML injection, Inject 0.15 mg into the muscle as needed for anaphylaxis, Disp: , Rfl:      GEMTESA 75 MG TABS tablet, Take 1 tablet every day by oral route., Disp: , Rfl:      ibuprofen (ADVIL/MOTRIN) 800 MG tablet, Take 800 mg by mouth, Disp: , Rfl:      ketoconazole (NIZORAL) 2 % external cream, , Disp: , Rfl:      naproxen (NAPROSYN) 500 MG tablet, Take 1 tablet (500 mg) by mouth 2 times daily (with meals), Disp: 28 tablet, Rfl: 0     penciclovir (DENAVIR) 1 % external cream, Apply 1 g topically every 2 hours, Disp: , Rfl:     Current Facility-Administered Medications:      botulinum toxin type A (BOTOX) 100 units injection 300 Units, 300 Units, Intramuscular, Q90 Days, Virginia Rosas MD       BOTULINUM NEUROTOXIN INJECTION PROCEDURES    VERIFICATION OF PATIENT IDENTIFICATION AND PROCEDURE     Initials   Patient Name SES   Patient  SES   Procedure Verified by: SES     Prior to the start of the procedure and with procedural staff participation, I verbally confirmed the patient s identity using two indicators, relevant allergies, that the procedure was appropriate and matched the consent or emergent situation, and that the correct equipment/implants were available. Immediately prior to starting the procedure I conducted the Time Out with the procedural staff and re-confirmed the patient s name, procedure, and site/side. 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  She is here today for reinjection with Botox.    GOAL OF PROCEDURE  The goal of this procedure is to increase active range of motion, improve volitional motor control, decrease pain  and enhance functional independence.      TOTAL DOSE ADMINISTERED  Dose Administered:  200 units  Botox (Botulinum Toxin Type A)       1:1 Dilution   Unavoidable Drug Waste: No  Diluent Used:  Preservative Free Normal Saline  Total Volume of Diluent Used:  2 ml  NDC #: Botox 100u ([address]-14)      CONSENT  The risks, benefits, and treatment options were discussed with Kendra Vaca and she agreed to proceed.    Written consent was obtained by  dejon .     EQUIPMENT USED  Needle-37mm stimulating/recording  EMG/NCS Machine    SKIN PREPARATION  Skin preparation was performed using an alcohol wipe.    GUIDANCE DESCRIPTION  Electro-myographic guidance was necessary throughout the procedure to accurately identify all areas of dystonic muscles while avoiding injection of non-dystonic muscles, neighboring nerves and nearby vascular structures.       AREA/MUSCLE INJECTED:  200 UNITS BOTOX = TOTAL DOSE, 1:1 DILUTION    1. HEAD & NECK MUSCLES: 200 UNITS BOTOX = TOTAL DOSE     Right Mid-Trapezius - 20 units of Botox at 1 site/s.   Left Mid-Trapezius - 20 units of Botox at 1 site/s.      Right lateral Trapezius - 30 units of Botox at 1 site.  Left lateral Trapezius - 10 units of Botox at 1 site.      Right Splenius Cervicis - 30 units of Botox at 1 site/s.   Left Splenius Cervicis - 20 units of Botox at 1 site/s.      Right Levator Scapulae - 20 units of Botox at 1 site/s (shoulder muscles).   Left Levator Scapulae - 20 units of Botox at 1 site/s (shoulder muscles).                  Right Sternocleidomastoid - 20  units of Botox at 1 site/s.               Left Sternocleidomastoid - 10 units of Botox at 1 site/s.       RESPONSE TO PROCEDURE  Kendra Vaca tolerated the procedure well and there were no immediate complications. She was allowed to recover for an appropriate period of time and was discharged home in stable condition.    ASSESSMENT AND PLAN   Botulinum toxin injections: No changes made to Botox dose or distribution today. Patient will continue to monitor response and report at next appointment.   Referrals: None.   Follow up: Kendra Vaca was rescheduled for the next series of injections in 12 weeks, at which time we will evaluate response to today's injections. she may call the clinic prior with any questions or concerns prior to the next appointment.       Again, thank you for allowing me to participate in the care of your patient.        Sincerely,        Virginia Rosas MD

## 2024-04-04 NOTE — PROGRESS NOTES
[facility]    PM&R CLINIC NOTE  BOTULINUM TOXIN PROCEDURE      HPI  Chief Complaint   Patient presents with    Procedure     Botox     Kendra Vaca is a 63 year old female with a history of involuntary spasms of the neck and shoulder muscles with tremor who presents to clinic for botulinum toxin injections for management of cervical dystonia.     SINCE LAST VISIT  Kendra Vaca was last seen here in clinic on 1/11/2024, at which time she received 200 units of Botox.    Patient reports the following new medical problems since last visit: She underwent a right ankle fusion on 1/29/2024 for severe arthritis and intractable pain, and this went well. She is currently in a walking boot.      RESPONSE TO PREVIOUS TREATMENT    Side effects: No problems reported    Pain Improvement: Yes.  Percent Improvement: 90 %    Duration of Benefit:   10 weeks and followed by a gradual reduction in benefit    Dystonia Improvement: Yes.  Percent Improvement: 90 %    Duration of Benefit:   10 weeks and followed by a gradual reduction in benefit.       PHYSICAL EXAM  VS: /76 (BP Location: Right arm, Patient Position: Sitting)   Pulse 99   LMP 02/15/2012    GEN: Pleasant and cooperative, in no acute distress  HEENT: No facial asymmetry  HEAD, NECK AND TRUNK PATTERN:   Continuous multidirectional tremor  Right rotation  Left side bending      ALLERGIES  Allergies   Allergen Reactions    Ciprofloxacin Anaphylaxis and Other (See Comments)     Throat swelling      Morphine Sulfate Hives    Seafood Hives    Shellfish-Derived Products Anaphylaxis    Latex      CONTACT RASH WITH LATEX PRODUCTS IN THE HEAT OF SUMMER       CURRENT MEDICATIONS    Current Outpatient Medications:     acetaminophen (TYLENOL) 500 MG tablet, Take 500-1,000 mg by mouth every 6 hours as needed for mild pain (can take 4 per day), Disp: , Rfl:     azelastine (OPTIVAR) 0.05 % ophthalmic solution, Apply 1 drop to eye, Disp: ,  Rfl:     azelastine (OPTIVAR) 0.05 % SOLN, Place 1 drop into both eyes as needed , Disp: , Rfl:     EPINEPHrine (EPIPEN JR) 0.15 MG/0.3ML injection, Inject 0.15 mg into the muscle as needed for anaphylaxis, Disp: , Rfl:     GEMTESA 75 MG TABS tablet, Take 1 tablet every day by oral route., Disp: , Rfl:     ibuprofen (ADVIL/MOTRIN) 800 MG tablet, Take 800 mg by mouth, Disp: , Rfl:     ketoconazole (NIZORAL) 2 % external cream, , Disp: , Rfl:     naproxen (NAPROSYN) 500 MG tablet, Take 1 tablet (500 mg) by mouth 2 times daily (with meals), Disp: 28 tablet, Rfl: 0    penciclovir (DENAVIR) 1 % external cream, Apply 1 g topically every 2 hours, Disp: , Rfl:     Current Facility-Administered Medications:     botulinum toxin type A (BOTOX) 100 units injection 300 Units, 300 Units, Intramuscular, Q90 Days, Virginia Rosas MD       BOTULINUM NEUROTOXIN INJECTION PROCEDURES    VERIFICATION OF PATIENT IDENTIFICATION AND PROCEDURE     Initials   Patient Name SES   Patient  SES   Procedure Verified by: SES     Prior to the start of the procedure and with procedural staff participation, I verbally confirmed the patient s identity using two indicators, relevant allergies, that the procedure was appropriate and matched the consent or emergent situation, and that the correct equipment/implants were available. Immediately prior to starting the procedure I conducted the Time Out with the procedural staff and re-confirmed the patient s name, procedure, and site/side. 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  She is here today for reinjection with Botox.    GOAL OF  PROCEDURE  The goal of this procedure is to increase active range of motion, improve volitional motor control, decrease pain  and enhance functional independence.      TOTAL DOSE ADMINISTERED  Dose Administered:  200 units  Botox (Botulinum Toxin Type A)       1:1 Dilution   Unavoidable Drug Waste: No  Diluent Used:  Preservative Free Normal Saline  Total Volume of Diluent Used:  2 ml  NDC #: Botox 100u ([address]-05)      CONSENT  The risks, benefits, and treatment options were discussed with Kendra Vaca and she agreed to proceed.    Written consent was obtained by  dejon .     EQUIPMENT USED  Needle-37mm stimulating/recording  EMG/NCS Machine    SKIN PREPARATION  Skin preparation was performed using an alcohol wipe.    GUIDANCE DESCRIPTION  Electro-myographic guidance was necessary throughout the procedure to accurately identify all areas of dystonic muscles while avoiding injection of non-dystonic muscles, neighboring nerves and nearby vascular structures.       AREA/MUSCLE INJECTED:  200 UNITS BOTOX = TOTAL DOSE, 1:1 DILUTION    1. HEAD & NECK MUSCLES: 200 UNITS BOTOX = TOTAL DOSE     Right Mid-Trapezius - 20 units of Botox at 1 site/s.   Left Mid-Trapezius - 20 units of Botox at 1 site/s.      Right lateral Trapezius - 30 units of Botox at 1 site.  Left lateral Trapezius - 10 units of Botox at 1 site.      Right Splenius Cervicis - 30 units of Botox at 1 site/s.   Left Splenius Cervicis - 20 units of Botox at 1 site/s.      Right Levator Scapulae - 20 units of Botox at 1 site/s (shoulder muscles).   Left Levator Scapulae - 20 units of Botox at 1 site/s (shoulder muscles).                  Right Sternocleidomastoid - 20 units of Botox at 1 site/s.               Left Sternocleidomastoid - 10 units of Botox at 1 site/s.       RESPONSE TO PROCEDURE  Kendra Vaca tolerated the procedure well and there were no immediate complications. She was allowed to recover for an appropriate period of time and was  discharged home in stable condition.    ASSESSMENT AND PLAN   Botulinum toxin injections: No changes made to Botox dose or distribution today. Patient will continue to monitor response and report at next appointment.   Referrals: None.   Follow up: Kendra Vaca was rescheduled for the next series of injections in 12 weeks, at which time we will evaluate response to today's injections. she may call the clinic prior with any questions or concerns prior to the next appointment.

## 2024-07-11 ENCOUNTER — OFFICE VISIT (OUTPATIENT)
Dept: PHYSICAL MEDICINE AND REHAB | Facility: CLINIC | Age: 64
End: 2024-07-11
Payer: MEDICARE

## 2024-07-11 VITALS — HEART RATE: 92 BPM | SYSTOLIC BLOOD PRESSURE: 104 MMHG | DIASTOLIC BLOOD PRESSURE: 71 MMHG

## 2024-07-11 DIAGNOSIS — G24.3 SPASMODIC TORTICOLLIS: Primary | ICD-10-CM

## 2024-07-11 DIAGNOSIS — G24.1 GENETIC TORSION DYSTONIA: ICD-10-CM

## 2024-07-11 PROCEDURE — 95874 GUIDE NERV DESTR NEEDLE EMG: CPT | Performed by: PHYSICAL MEDICINE & REHABILITATION

## 2024-07-11 PROCEDURE — 64616 CHEMODENERV MUSC NECK DYSTON: CPT | Mod: 50 | Performed by: PHYSICAL MEDICINE & REHABILITATION

## 2024-07-11 RX ORDER — ATORVASTATIN CALCIUM 20 MG/1
1 TABLET, FILM COATED ORAL AT BEDTIME
COMMUNITY
Start: 2024-05-20

## 2024-07-11 RX ORDER — CEPHALEXIN 500 MG/1
500 CAPSULE ORAL ONCE
COMMUNITY

## 2024-07-11 NOTE — NURSING NOTE
Chief Complaint   Patient presents with    Procedure     Botox     Zhanna Low MA on 7/11/2024 at 12:34 PM

## 2024-07-11 NOTE — LETTER
7/11/2024      Kendra Vaca  [address] [facility] [address]      Dear Colleague,    Thank you for referring your patient, Kendra Vaca, to the [facility]. Please see a copy of my visit note below.      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     SINCE LAST VISIT  Kendra Vaca was last seen here in clinic on 4/4/2024, at which time she received 200 units of Botox.    Patient reports the following new medical problems since last visit: She continues to wear a walking boot following a right ankle fusion on 1/29/2024 for severe arthritis and intractable pain.      RESPONSE TO PREVIOUS TREATMENT    Side effects: No problems reported    Pain Improvement: Yes.  Percent Improvement: 90 %    Duration of Benefit:   11 weeks and followed by a gradual reduction in benefit    Dystonia Improvement: Yes.  Percent Improvement: 90 %    Duration of Benefit:   11 weeks and followed by a gradual reduction in benefit.       PHYSICAL EXAM  VS: /71 (BP Location: Right arm, Patient Position: Sitting)   Pulse 92   LMP 02/15/2012    GEN: Pleasant and cooperative, in no acute distress  HEENT: No facial asymmetry  HEAD, NECK AND TRUNK PATTERN:   Continuous multidirectional tremor  Right rotation  Left side bending      ALLERGIES  Allergies   Allergen Reactions     Ciprofloxacin Anaphylaxis and Other (See Comments)     Throat swelling       Morphine Sulfate Hives     Seafood Hives     Shellfish-Derived Products Anaphylaxis     Latex      CONTACT RASH WITH LATEX PRODUCTS IN THE HEAT OF SUMMER       CURRENT MEDICATIONS    Current Outpatient  Medications:      acetaminophen (TYLENOL) 500 MG tablet, Take 500-1,000 mg by mouth every 6 hours as needed for mild pain (can take 4 per day), Disp: , Rfl:      atorvastatin (LIPITOR) 20 MG tablet, Take 1 tablet by mouth at bedtime, Disp: , Rfl:      azelastine (OPTIVAR) 0.05 % ophthalmic solution, Apply 1 drop to eye, Disp: , Rfl:      azelastine (OPTIVAR) 0.05 % SOLN, Place 1 drop into both eyes as needed , Disp: , Rfl:      cephALEXin (KEFLEX) 500 MG capsule, Take 500 mg by mouth once, Disp: , Rfl:      EPINEPHrine (EPIPEN JR) 0.15 MG/0.3ML injection, Inject 0.15 mg into the muscle as needed for anaphylaxis, Disp: , Rfl:      GEMTESA 75 MG TABS tablet, Take 1 tablet every day by oral route., Disp: , Rfl:      ibuprofen (ADVIL/MOTRIN) 800 MG tablet, Take 800 mg by mouth, Disp: , Rfl:      ketoconazole (NIZORAL) 2 % external cream, , Disp: , Rfl:      naproxen (NAPROSYN) 500 MG tablet, Take 1 tablet (500 mg) by mouth 2 times daily (with meals), Disp: 28 tablet, Rfl: 0     penciclovir (DENAVIR) 1 % external cream, Apply 1 g topically every 2 hours, Disp: , Rfl:     Current Facility-Administered Medications:      botulinum toxin type A (BOTOX) 100 units injection 300 Units, 300 Units, Intramuscular, Q90 Days, Standal, Virginia Kong MD, 200 Units at 24 1536       BOTULINUM NEUROTOXIN INJECTION PROCEDURES    VERIFICATION OF PATIENT IDENTIFICATION AND PROCEDURE     Initials   Patient Name SES   Patient  SES   Procedure Verified by: SES     Prior to the start of the procedure and with procedural staff participation, I verbally confirmed the patient s identity using two indicators, relevant allergies, that the procedure was appropriate and matched the consent or emergent situation, and that the correct equipment/implants were available. Immediately prior to starting the procedure I conducted the Time Out with the procedural staff and re-confirmed the patient s name, procedure, and site/side. (The Joint  Commission universal protocol was followed.)  Yes    Sedation (Moderate or Deep): None    ABOVE ASSESSMENTS PERFORMED BY    Virginia Rosas MD      INDICATIONS FOR PROCEDURES  Kendra Vaca is a 64 year old patient with a history of involuntary spasms of the neck and shoulder muscles with tremor who presents to clinic for botulinum toxin injections for management of cervical dystonia.  She is here today for reinjection with Botox.    GOAL OF PROCEDURE  The goal of this procedure is to increase active range of motion, improve volitional motor control, decrease pain  and enhance functional independence.      TOTAL DOSE ADMINISTERED  Dose Administered:  200 units  Botox (Botulinum Toxin Type A)       1:1 Dilution   Unavoidable Drug Waste: No  Diluent Used:  Preservative Free Normal Saline  Total Volume of Diluent Used:  2 ml  NDC #: Botox 100u ([address]-87)      CONSENT  The risks, benefits, and treatment options were discussed with Kendra Vaca and she agreed to proceed.    Written consent was obtained by  [facility] .     EQUIPMENT USED  Needle-37mm stimulating/recording  EMG/NCS Machine    SKIN PREPARATION  Skin preparation was performed using an alcohol wipe.    GUIDANCE DESCRIPTION  Electro-myographic guidance was necessary throughout the procedure to accurately identify all areas of dystonic muscles while avoiding injection of non-dystonic muscles, neighboring nerves and nearby vascular structures.       AREA/MUSCLE INJECTED:  200 UNITS BOTOX = TOTAL DOSE, 1:1 DILUTION    1. HEAD & NECK MUSCLES: 200 UNITS BOTOX = TOTAL DOSE     Right Mid-Trapezius - 20 units of Botox at 1 site/s.   Left Mid-Trapezius - 20 units of Botox at 1 site/s.      Right lateral Trapezius - 30 units of Botox at 1 site.  Left lateral Trapezius - 10 units of Botox at 1 site.      Right Splenius Cervicis - 30 units of Botox at 1 site/s.   Left Splenius Cervicis - 20 units of Botox at 1 site/s.      Right Levator Scapulae - 20 units  of Botox at 1 site/s (shoulder muscles).   Left Levator Scapulae - 20 units of Botox at 1 site/s (shoulder muscles).                  Right Sternocleidomastoid - 20 units of Botox at 1 site/s.               Left Sternocleidomastoid - 10 units of Botox at 1 site/s.       RESPONSE TO PROCEDURE  Kendra Vaca tolerated the procedure well and there were no immediate complications. She was allowed to recover for an appropriate period of time and was discharged home in stable condition.    ASSESSMENT AND PLAN   Botulinum toxin injections: No changes made to Botox dose or distribution today. Patient will continue to monitor response and report at next appointment.   Referrals: None.   Follow up: Kendra Vaca was rescheduled for the next series of injections in 12 weeks, at which time we will evaluate response to today's injections. she may call the clinic prior with any questions or concerns prior to the next appointment.       Again, thank you for allowing me to participate in the care of your patient.        Sincerely,        Virginia Rosas MD

## 2024-07-11 NOTE — PROGRESS NOTES
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     SINCE LAST VISIT  Kendra Vaca was last seen here in clinic on 4/4/2024, at which time she received 200 units of Botox.    Patient reports the following new medical problems since last visit: She continues to wear a walking boot following a right ankle fusion on 1/29/2024 for severe arthritis and intractable pain.      RESPONSE TO PREVIOUS TREATMENT    Side effects: No problems reported    Pain Improvement: Yes.  Percent Improvement: 90 %    Duration of Benefit:   11 weeks and followed by a gradual reduction in benefit    Dystonia Improvement: Yes.  Percent Improvement: 90 %    Duration of Benefit:   11 weeks and followed by a gradual reduction in benefit.       PHYSICAL EXAM  VS: /71 (BP Location: Right arm, Patient Position: Sitting)   Pulse 92   LMP 02/15/2012    GEN: Pleasant and cooperative, in no acute distress  HEENT: No facial asymmetry  HEAD, NECK AND TRUNK PATTERN:   Continuous multidirectional tremor  Right rotation  Left side bending      ALLERGIES  Allergies   Allergen Reactions    Ciprofloxacin Anaphylaxis and Other (See Comments)     Throat swelling      Morphine Sulfate Hives    Seafood Hives    Shellfish-Derived Products Anaphylaxis    Latex      CONTACT RASH WITH LATEX PRODUCTS IN THE HEAT OF SUMMER       CURRENT MEDICATIONS    Current Outpatient Medications:     acetaminophen (TYLENOL) 500 MG tablet, Take 500-1,000 mg by mouth every 6 hours as needed for mild pain (can take 4 per day), Disp: , Rfl:     atorvastatin (LIPITOR) 20 MG tablet, Take 1 tablet by mouth at bedtime, Disp: , Rfl:     azelastine  (OPTIVAR) 0.05 % ophthalmic solution, Apply 1 drop to eye, Disp: , Rfl:     azelastine (OPTIVAR) 0.05 % SOLN, Place 1 drop into both eyes as needed , Disp: , Rfl:     cephALEXin (KEFLEX) 500 MG capsule, Take 500 mg by mouth once, Disp: , Rfl:     EPINEPHrine (EPIPEN JR) 0.15 MG/0.3ML injection, Inject 0.15 mg into the muscle as needed for anaphylaxis, Disp: , Rfl:     GEMTESA 75 MG TABS tablet, Take 1 tablet every day by oral route., Disp: , Rfl:     ibuprofen (ADVIL/MOTRIN) 800 MG tablet, Take 800 mg by mouth, Disp: , Rfl:     ketoconazole (NIZORAL) 2 % external cream, , Disp: , Rfl:     naproxen (NAPROSYN) 500 MG tablet, Take 1 tablet (500 mg) by mouth 2 times daily (with meals), Disp: 28 tablet, Rfl: 0    penciclovir (DENAVIR) 1 % external cream, Apply 1 g topically every 2 hours, Disp: , Rfl:     Current Facility-Administered Medications:     botulinum toxin type A (BOTOX) 100 units injection 300 Units, 300 Units, Intramuscular, Q90 Days, Virginia Rosas MD, 200 Units at 24 1536       BOTULINUM NEUROTOXIN INJECTION PROCEDURES    VERIFICATION OF PATIENT IDENTIFICATION AND PROCEDURE     Initials   Patient Name SES   Patient  SES   Procedure Verified by: SES     Prior to the start of the procedure and with procedural staff participation, I verbally confirmed the patient s identity using two indicators, relevant allergies, that the procedure was appropriate and matched the consent or emergent situation, and that the correct equipment/implants were available. Immediately prior to starting the procedure I conducted the Time Out with the procedural staff and re-confirmed the patient s name, procedure, and site/side. (The Joint Commission universal protocol was followed.)  Yes    Sedation (Moderate or Deep): None    ABOVE ASSESSMENTS PERFORMED BY    Virginia Rosas MD      INDICATIONS FOR PROCEDURES  Kendra Vaca is a 64 year old patient with a history of involuntary spasms of the neck and  shoulder muscles with tremor who presents to clinic for botulinum toxin injections for management of cervical dystonia.  She is here today for reinjection with Botox.    GOAL OF PROCEDURE  The goal of this procedure is to increase active range of motion, improve volitional motor control, decrease pain  and enhance functional independence.      TOTAL DOSE ADMINISTERED  Dose Administered:  200 units  Botox (Botulinum Toxin Type A)       1:1 Dilution   Unavoidable Drug Waste: No  Diluent Used:  Preservative Free Normal Saline  Total Volume of Diluent Used:  2 ml  NDC #: Botox 100u ([address]-57)      CONSENT  The risks, benefits, and treatment options were discussed with Kendra De Leónbartolo and she agreed to proceed.    Written consent was obtained by  [facility] .     EQUIPMENT USED  Needle-37mm stimulating/recording  EMG/NCS Machine    SKIN PREPARATION  Skin preparation was performed using an alcohol wipe.    GUIDANCE DESCRIPTION  Electro-myographic guidance was necessary throughout the procedure to accurately identify all areas of dystonic muscles while avoiding injection of non-dystonic muscles, neighboring nerves and nearby vascular structures.       AREA/MUSCLE INJECTED:  200 UNITS BOTOX = TOTAL DOSE, 1:1 DILUTION    1. HEAD & NECK MUSCLES: 200 UNITS BOTOX = TOTAL DOSE     Right Mid-Trapezius - 20 units of Botox at 1 site/s.   Left Mid-Trapezius - 20 units of Botox at 1 site/s.      Right lateral Trapezius - 30 units of Botox at 1 site.  Left lateral Trapezius - 10 units of Botox at 1 site.      Right Splenius Cervicis - 30 units of Botox at 1 site/s.   Left Splenius Cervicis - 20 units of Botox at 1 site/s.      Right Levator Scapulae - 20 units of Botox at 1 site/s (shoulder muscles).   Left Levator Scapulae - 20 units of Botox at 1 site/s (shoulder muscles).                  Right Sternocleidomastoid - 20 units of Botox at 1 site/s.               Left Sternocleidomastoid - 10 units of Botox at 1 site/s.        RESPONSE TO PROCEDURE  Kendra Vaca tolerated the procedure well and there were no immediate complications. She was allowed to recover for an appropriate period of time and was discharged home in stable condition.    ASSESSMENT AND PLAN   Botulinum toxin injections: No changes made to Botox dose or distribution today. Patient will continue to monitor response and report at next appointment.   Referrals: None.   Follow up: Kendra Vaca was rescheduled for the next series of injections in 12 weeks, at which time we will evaluate response to today's injections. she may call the clinic prior with any questions or concerns prior to the next appointment.

## 2024-10-03 ENCOUNTER — TELEPHONE (OUTPATIENT)
Dept: PHYSICAL MEDICINE AND REHAB | Facility: CLINIC | Age: 64
End: 2024-10-03

## 2024-10-03 ENCOUNTER — OFFICE VISIT (OUTPATIENT)
Dept: PHYSICAL MEDICINE AND REHAB | Facility: CLINIC | Age: 64
End: 2024-10-03
Payer: MEDICARE

## 2024-10-03 VITALS — DIASTOLIC BLOOD PRESSURE: 68 MMHG | SYSTOLIC BLOOD PRESSURE: 112 MMHG | HEART RATE: 84 BPM

## 2024-10-03 DIAGNOSIS — G24.3 SPASMODIC TORTICOLLIS: Primary | ICD-10-CM

## 2024-10-03 DIAGNOSIS — G24.1 GENETIC TORSION DYSTONIA: ICD-10-CM

## 2024-10-03 PROCEDURE — 95874 GUIDE NERV DESTR NEEDLE EMG: CPT | Mod: GC | Performed by: PHYSICAL MEDICINE & REHABILITATION

## 2024-10-03 PROCEDURE — 64616 CHEMODENERV MUSC NECK DYSTON: CPT | Mod: 50 | Performed by: PHYSICAL MEDICINE & REHABILITATION

## 2024-10-03 NOTE — PROGRESS NOTES
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     SINCE LAST VISIT  Kendra Vaca was last seen here in clinic on 7/11/2024, at which time she received 200 units of Botox. She reports that the injections wore off roughly 4 days ago, and denies     Patient reports the following new medical problems since last visit: She continues to wear a walking boot following a right ankle fusion on 1/29/2024 for severe arthritis and intractable pain.      RESPONSE TO PREVIOUS TREATMENT    Side effects: No problems reported    Pain Improvement: Yes.  Percent Improvement: 90 %    Duration of Benefit:   11 weeks and followed by a gradual reduction in benefit    Dystonia Improvement: Yes.  Percent Improvement: 90 %    Duration of Benefit:   11 weeks and followed by a gradual reduction in benefit.       PHYSICAL EXAM  VS: /68 (BP Location: Right arm, Patient Position: Sitting)   Pulse 84   LMP 02/15/2012    GEN: Pleasant and cooperative, in no acute distress  HEENT: No facial asymmetry  HEAD, NECK AND TRUNK PATTERN:   Continuous multidirectional tremor, 'no-no' most obvious  Right rotation  Left side bending      ALLERGIES  Allergies   Allergen Reactions    Ciprofloxacin Anaphylaxis and Other (See Comments)     Throat swelling      Morphine Sulfate Hives    Seafood Hives    Shellfish-Derived Products Anaphylaxis    Latex      CONTACT RASH WITH LATEX PRODUCTS IN THE HEAT OF SUMMER       CURRENT MEDICATIONS    Current Outpatient Medications:     acetaminophen (TYLENOL) 500 MG tablet, Take 500-1,000 mg by mouth every 6 hours as needed for mild pain (can take 4 per day), Disp: , Rfl:      atorvastatin (LIPITOR) 20 MG tablet, Take 1 tablet by mouth at bedtime, Disp: , Rfl:     azelastine (OPTIVAR) 0.05 % ophthalmic solution, Apply 1 drop to eye, Disp: , Rfl:     azelastine (OPTIVAR) 0.05 % SOLN, Place 1 drop into both eyes as needed , Disp: , Rfl:     cephALEXin (KEFLEX) 500 MG capsule, Take 500 mg by mouth once, Disp: , Rfl:     EPINEPHrine (EPIPEN JR) 0.15 MG/0.3ML injection, Inject 0.15 mg into the muscle as needed for anaphylaxis, Disp: , Rfl:     GEMTESA 75 MG TABS tablet, Take 1 tablet every day by oral route., Disp: , Rfl:     ibuprofen (ADVIL/MOTRIN) 800 MG tablet, Take 800 mg by mouth, Disp: , Rfl:     ketoconazole (NIZORAL) 2 % external cream, , Disp: , Rfl:     naproxen (NAPROSYN) 500 MG tablet, Take 1 tablet (500 mg) by mouth 2 times daily (with meals), Disp: 28 tablet, Rfl: 0    penciclovir (DENAVIR) 1 % external cream, Apply 1 g topically every 2 hours, Disp: , Rfl:     Current Facility-Administered Medications:     botulinum toxin type A (BOTOX) 100 units injection 300 Units, 300 Units, Intramuscular, Q90 Days, Virginia Rosas MD, 200 Units at 24 1321       BOTULINUM NEUROTOXIN INJECTION PROCEDURES    VERIFICATION OF PATIENT IDENTIFICATION AND PROCEDURE     Initials   Patient Name SES   Patient  SES   Procedure Verified by: SES     Prior to the start of the procedure and with procedural staff participation, I verbally confirmed the patient s identity using two indicators, relevant allergies, that the procedure was appropriate and matched the consent or emergent situation, and that the correct equipment/implants were available. Immediately prior to starting the procedure I conducted the Time Out with the procedural staff and re-confirmed the patient s name, procedure, and site/side. 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  She is here today for reinjection with Botox.    GOAL OF PROCEDURE  The goal of this procedure is to increase active range of motion, improve volitional motor control, decrease pain  and enhance functional independence.      TOTAL DOSE ADMINISTERED  Dose Administered:  200 units  Botox (Botulinum Toxin Type A)       1:1 Dilution   Unavoidable Drug Waste: No  Diluent Used:  Preservative Free Normal Saline  Total Volume of Diluent Used:  2 ml  NDC #: Botox 100u ([address]-42)      CONSENT  The risks, benefits, and treatment options were discussed with Kendra Vaca and she agreed to proceed.    Written consent was obtained by  [facility] .     EQUIPMENT USED  Needle-37mm stimulating/recording  EMG/NCS Machine    SKIN PREPARATION  Skin preparation was performed using an alcohol wipe.    GUIDANCE DESCRIPTION  Electro-myographic guidance was necessary throughout the procedure to accurately identify all areas of dystonic muscles while avoiding injection of non-dystonic muscles, neighboring nerves and nearby vascular structures.       AREA/MUSCLE INJECTED:  200 UNITS BOTOX = TOTAL DOSE, 1:1 DILUTION    1. HEAD & NECK MUSCLES: 200 UNITS BOTOX = TOTAL DOSE     Right Mid-Trapezius - 20 units of Botox at 1 site/s.   Left Mid-Trapezius - 20 units of Botox at 1 site/s.      Right lateral Trapezius - 30 units of Botox at 1 site.  Left lateral Trapezius - 10 units of Botox at 1 site.      Right Splenius Cervicis - 30 units of Botox at 1 site/s.   Left Splenius Cervicis - 20 units of Botox at 1 site/s.      Right Levator Scapulae - 20 units of Botox at 1 site/s (shoulder muscles).   Left Levator Scapulae - 20 units of Botox at 1 site/s (shoulder muscles).                  Right Sternocleidomastoid - 20  units of Botox at 1 site/s.               Left Sternocleidomastoid - 10 units of Botox at 1 site/s.       RESPONSE TO PROCEDURE  Kendra Vaca tolerated the procedure well and there were no immediate complications. She was allowed to recover for an appropriate period of time and was discharged home in stable condition.    ASSESSMENT AND PLAN   Botulinum toxin injections: No changes made to Botox dose or distribution today. Patient will continue to monitor response and report at next appointment.   Referrals: None.   Follow up: Kendra Vaca was rescheduled for the next series of injections in 12 weeks, at which time we will evaluate response to today's injections. she may call the clinic prior with any questions or concerns prior to the next appointment.     Patient seen and discussed with PM&R staff attending, Dr. Alison Briones, DO  PGY-4  Physical Medicine and Rehabilitation        I, Virginia Rosas MD, saw this patient with resident, Dr. Briones, and agree with the findings and plan of care as documented in this note. I personally reviewed the chart (vitals signs, medications, labs and imaging). My key findings and key management decisions made are reflected in this note.  I was present for the entire procedure listed above.      Virginia Rosas MD

## 2024-10-03 NOTE — LETTER
10/3/2024      Kendra Vaca  [address] [facility] [address]      Dear Colleague,    Thank you for referring your patient, Kendra Vaca, to the [facility]. Please see a copy of my visit note below.      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     SINCE LAST VISIT  Kendra Vaca was last seen here in clinic on 7/11/2024, at which time she received 200 units of Botox. She reports that the injections wore off roughly 4 days ago, and denies     Patient reports the following new medical problems since last visit: She continues to wear a walking boot following a right ankle fusion on 1/29/2024 for severe arthritis and intractable pain.      RESPONSE TO PREVIOUS TREATMENT    Side effects: No problems reported    Pain Improvement: Yes.  Percent Improvement: 90 %    Duration of Benefit:   11 weeks and followed by a gradual reduction in benefit    Dystonia Improvement: Yes.  Percent Improvement: 90 %    Duration of Benefit:   11 weeks and followed by a gradual reduction in benefit.       PHYSICAL EXAM  VS: /68 (BP Location: Right arm, Patient Position: Sitting)   Pulse 84   LMP 02/15/2012    GEN: Pleasant and cooperative, in no acute distress  HEENT: No facial asymmetry  HEAD, NECK AND TRUNK PATTERN:   Continuous multidirectional tremor, 'no-no' most obvious  Right rotation  Left side bending      ALLERGIES  Allergies   Allergen Reactions     Ciprofloxacin Anaphylaxis and Other (See Comments)     Throat swelling       Morphine Sulfate Hives     Seafood Hives     Shellfish-Derived Products Anaphylaxis     Latex      CONTACT  RASH WITH LATEX PRODUCTS IN THE HEAT OF SUMMER       CURRENT MEDICATIONS    Current Outpatient Medications:      acetaminophen (TYLENOL) 500 MG tablet, Take 500-1,000 mg by mouth every 6 hours as needed for mild pain (can take 4 per day), Disp: , Rfl:      atorvastatin (LIPITOR) 20 MG tablet, Take 1 tablet by mouth at bedtime, Disp: , Rfl:      azelastine (OPTIVAR) 0.05 % ophthalmic solution, Apply 1 drop to eye, Disp: , Rfl:      azelastine (OPTIVAR) 0.05 % SOLN, Place 1 drop into both eyes as needed , Disp: , Rfl:      cephALEXin (KEFLEX) 500 MG capsule, Take 500 mg by mouth once, Disp: , Rfl:      EPINEPHrine (EPIPEN JR) 0.15 MG/0.3ML injection, Inject 0.15 mg into the muscle as needed for anaphylaxis, Disp: , Rfl:      GEMTESA 75 MG TABS tablet, Take 1 tablet every day by oral route., Disp: , Rfl:      ibuprofen (ADVIL/MOTRIN) 800 MG tablet, Take 800 mg by mouth, Disp: , Rfl:      ketoconazole (NIZORAL) 2 % external cream, , Disp: , Rfl:      naproxen (NAPROSYN) 500 MG tablet, Take 1 tablet (500 mg) by mouth 2 times daily (with meals), Disp: 28 tablet, Rfl: 0     penciclovir (DENAVIR) 1 % external cream, Apply 1 g topically every 2 hours, Disp: , Rfl:     Current Facility-Administered Medications:      botulinum toxin type A (BOTOX) 100 units injection 300 Units, 300 Units, Intramuscular, Q90 Days, Standal, Virginia Kong MD, 200 Units at 24 1321       BOTULINUM NEUROTOXIN INJECTION PROCEDURES    VERIFICATION OF PATIENT IDENTIFICATION AND PROCEDURE     Initials   Patient Name SES   Patient  SES   Procedure Verified by: SES     Prior to the start of the procedure and with procedural staff participation, I verbally confirmed the patient s identity using two indicators, relevant allergies, that the procedure was appropriate and matched the consent or emergent situation, and that the correct equipment/implants were available. Immediately prior to starting the procedure I conducted the Time Out with the  procedural staff and re-confirmed the patient s name, procedure, and site/side. 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  She is here today for reinjection with Botox.    GOAL OF PROCEDURE  The goal of this procedure is to increase active range of motion, improve volitional motor control, decrease pain  and enhance functional independence.      TOTAL DOSE ADMINISTERED  Dose Administered:  200 units  Botox (Botulinum Toxin Type A)       1:1 Dilution   Unavoidable Drug Waste: No  Diluent Used:  Preservative Free Normal Saline  Total Volume of Diluent Used:  2 ml  NDC #: Botox 100u ([address]-50)      CONSENT  The risks, benefits, and treatment options were discussed with Kendra Vaca and she agreed to proceed.    Written consent was obtained by  [facility] .     EQUIPMENT USED  Needle-37mm stimulating/recording  EMG/NCS Machine    SKIN PREPARATION  Skin preparation was performed using an alcohol wipe.    GUIDANCE DESCRIPTION  Electro-myographic guidance was necessary throughout the procedure to accurately identify all areas of dystonic muscles while avoiding injection of non-dystonic muscles, neighboring nerves and nearby vascular structures.       AREA/MUSCLE INJECTED:  200 UNITS BOTOX = TOTAL DOSE, 1:1 DILUTION    1. HEAD & NECK MUSCLES: 200 UNITS BOTOX = TOTAL DOSE     Right Mid-Trapezius - 20 units of Botox at 1 site/s.   Left Mid-Trapezius - 20 units of Botox at 1 site/s.      Right lateral Trapezius - 30 units of Botox at 1 site.  Left lateral Trapezius - 10 units of Botox at 1 site.      Right Splenius Cervicis - 30 units of Botox at 1 site/s.   Left  Splenius Cervicis - 20 units of Botox at 1 site/s.      Right Levator Scapulae - 20 units of Botox at 1 site/s (shoulder muscles).   Left Levator Scapulae - 20 units of Botox at 1 site/s (shoulder muscles).                  Right Sternocleidomastoid - 20 units of Botox at 1 site/s.               Left Sternocleidomastoid - 10 units of Botox at 1 site/s.       RESPONSE TO PROCEDURE  Kendra Vaca tolerated the procedure well and there were no immediate complications. She was allowed to recover for an appropriate period of time and was discharged home in stable condition.    ASSESSMENT AND PLAN   Botulinum toxin injections: No changes made to Botox dose or distribution today. Patient will continue to monitor response and report at next appointment.   Referrals: None.   Follow up: Kendra Vaca was rescheduled for the next series of injections in 12 weeks, at which time we will evaluate response to today's injections. she may call the clinic prior with any questions or concerns prior to the next appointment.     Patient seen and discussed with PM&R staff attending, Dr. Alison Briones, DO  PGY-4  Physical Medicine and Rehabilitation        I, Virginia Rosas MD, saw this patient with resident, Dr. Briones, and agree with the findings and plan of care as documented in this note. I personally reviewed the chart (vitals signs, medications, labs and imaging). My key findings and key management decisions made are reflected in this note.  I was present for the entire procedure listed above.      Virginia Rosas MD          Again, thank you for allowing me to participate in the care of your patient.        Sincerely,        Virginia Rosas MD

## 2024-10-03 NOTE — NURSING NOTE
Chief Complaint   Patient presents with    Procedure     BOTOX     Zhanna Low MA on 10/3/2024 at 1:55 PM

## 2024-10-03 NOTE — TELEPHONE ENCOUNTER
[facility] Call Center    Phone Message    May a detailed message be left on voicemail: yes     Reason for Call: Other: Pt needed to cancel her neurotoxin appointment with Dr Rosas this morning. Pt overslept and lives to far to drive in soon enough. Pt said she cannot wait until December for a visit and wondering if there's any other appointments in which we can see her sooner. Please review with pt when able.      Action Taken: Other: [facility] PM&R    Travel Screening: Not Applicable     Date of Service:

## 2025-02-05 DIAGNOSIS — G24.1 GENETIC TORSION DYSTONIA: ICD-10-CM

## 2025-02-05 DIAGNOSIS — G24.3 SPASMODIC TORTICOLLIS: Primary | ICD-10-CM

## 2025-02-06 ENCOUNTER — OFFICE VISIT (OUTPATIENT)
Dept: PHYSICAL MEDICINE AND REHAB | Facility: CLINIC | Age: 65
End: 2025-02-06
Payer: MEDICARE

## 2025-02-06 VITALS — HEART RATE: 130 BPM | SYSTOLIC BLOOD PRESSURE: 119 MMHG | DIASTOLIC BLOOD PRESSURE: 69 MMHG

## 2025-02-06 DIAGNOSIS — G24.1 GENETIC TORSION DYSTONIA: ICD-10-CM

## 2025-02-06 DIAGNOSIS — G24.3 SPASMODIC TORTICOLLIS: Primary | ICD-10-CM

## 2025-02-06 PROCEDURE — 64616 CHEMODENERV MUSC NECK DYSTON: CPT | Mod: 50 | Performed by: PHYSICAL MEDICINE & REHABILITATION

## 2025-02-06 PROCEDURE — 95874 GUIDE NERV DESTR NEEDLE EMG: CPT | Performed by: PHYSICAL MEDICINE & REHABILITATION

## 2025-02-06 NOTE — LETTER
2/6/2025      Kendra Vaca  [address] [facility] [address]      Dear Colleague,    Thank you for referring your patient, Kendra Vaca, to the [facility]. Please see a copy of my visit note below.      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     SINCE LAST VISIT  Kendra Vaca was last seen here in clinic on 10/3/2024, at which time she received 200 units of Botox.    Patient denies new medical diagnoses, illnesses, hospitalizations, emergency room visits, and injuries since the previous injection with botulinum neurotoxin.     RESPONSE TO PREVIOUS TREATMENT    Side effects: No problems reported    Pain Improvement: Yes.  Percent Improvement: 90 %    Duration of Benefit:   11 weeks and followed by a gradual reduction in benefit    Dystonia Improvement: Yes.  Percent Improvement: 90 %    Duration of Benefit:   11 weeks and followed by a gradual reduction in benefit.       PHYSICAL EXAM  VS: /69 (BP Location: Right arm, Patient Position: Sitting, Cuff Size: Adult Regular)   Pulse (!) 130   LMP 02/15/2012    GEN: Pleasant and cooperative, in no acute distress  HEENT: No facial asymmetry  HEAD, NECK AND TRUNK PATTERN:   Continuous multidirectional tremor, 'no-no' most obvious  Right rotation  Left side bending      ALLERGIES  Allergies   Allergen Reactions     Ciprofloxacin Anaphylaxis and Other (See Comments)     Throat swelling       Morphine Sulfate Hives     Seafood Hives     Shellfish-Derived Products Anaphylaxis     Latex      CONTACT RASH WITH LATEX PRODUCTS IN THE HEAT OF SUMMER       CURRENT  MEDICATIONS    Current Outpatient Medications:      acetaminophen (TYLENOL) 500 MG tablet, Take 500-1,000 mg by mouth every 6 hours as needed for mild pain (can take 4 per day), Disp: , Rfl:      atorvastatin (LIPITOR) 20 MG tablet, Take 1 tablet by mouth at bedtime, Disp: , Rfl:      azelastine (OPTIVAR) 0.05 % ophthalmic solution, Apply 1 drop to eye, Disp: , Rfl:      azelastine (OPTIVAR) 0.05 % SOLN, Place 1 drop into both eyes as needed , Disp: , Rfl:      cephALEXin (KEFLEX) 500 MG capsule, Take 500 mg by mouth once, Disp: , Rfl:      EPINEPHrine (EPIPEN JR) 0.15 MG/0.3ML injection, Inject 0.15 mg into the muscle as needed for anaphylaxis, Disp: , Rfl:      GEMTESA 75 MG TABS tablet, Take 1 tablet every day by oral route., Disp: , Rfl:      ibuprofen (ADVIL/MOTRIN) 800 MG tablet, Take 800 mg by mouth, Disp: , Rfl:      ketoconazole (NIZORAL) 2 % external cream, , Disp: , Rfl:      naproxen (NAPROSYN) 500 MG tablet, Take 1 tablet (500 mg) by mouth 2 times daily (with meals), Disp: 28 tablet, Rfl: 0     penciclovir (DENAVIR) 1 % external cream, Apply 1 g topically every 2 hours, Disp: , Rfl:     Current Facility-Administered Medications:      botulinum toxin type A (BOTOX) 100 units injection 300 Units, 300 Units, Intramuscular, Q90 Days, Virginia Rosas MD, 200 Units at 25 1454     botulinum toxin type A (BOTOX) 100 units injection 300 Units, 300 Units, Intramuscular, Q90 Days, Virginia Rosas MD, 200 Units at 24 0900       BOTULINUM NEUROTOXIN INJECTION PROCEDURES    VERIFICATION OF PATIENT IDENTIFICATION AND PROCEDURE     Initials   Patient Name SES   Patient  SES   Procedure Verified by: JASMYN     Prior to the start of the procedure and with procedural staff participation, I verbally confirmed the patient s identity using two indicators, relevant allergies, that the procedure was appropriate and matched the consent or emergent situation, and that the correct equipment/implants  were available. Immediately prior to starting the procedure I conducted the Time Out with the procedural staff and re-confirmed the patient s name, procedure, and site/side. 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  She is here today for reinjection with Botox.    GOAL OF PROCEDURE  The goal of this procedure is to increase active range of motion, improve volitional motor control, decrease pain  and enhance functional independence.      TOTAL DOSE ADMINISTERED  Dose Administered:  200 units  Botox (Botulinum Toxin Type A)       1:1 Dilution   Unavoidable Drug Waste: No  Diluent Used:  Preservative Free Normal Saline  Total Volume of Diluent Used:  2 ml  NDC #: Botox 100u ([address]-94)      CONSENT  The risks, benefits, and treatment options were discussed with Kendra Vaca and she agreed to proceed.    Written consent was obtained by  [facility] .     EQUIPMENT USED  Needle-37mm stimulating/recording  EMG/NCS Machine    SKIN PREPARATION  Skin preparation was performed using an alcohol wipe.    GUIDANCE DESCRIPTION  Electro-myographic guidance was necessary throughout the procedure to accurately identify all areas of dystonic muscles while avoiding injection of non-dystonic muscles, neighboring nerves and nearby vascular structures.       AREA/MUSCLE INJECTED:  200 UNITS BOTOX = TOTAL DOSE, 1:1 DILUTION    1. HEAD & NECK MUSCLES: 200 UNITS BOTOX = TOTAL DOSE     Right Mid-Trapezius - 20 units of Botox at 1 site/s.   Left Mid-Trapezius - 20 units of Botox at 1 site/s.      Right lateral Trapezius - 30 units of Botox at 1 site.  Left lateral Trapezius - 10  units of Botox at 1 site.      Right Splenius Cervicis - 30 units of Botox at 1 site/s.   Left Splenius Cervicis - 20 units of Botox at 1 site/s.      Right Levator Scapulae - 20 units of Botox at 1 site/s (shoulder muscles).   Left Levator Scapulae - 20 units of Botox at 1 site/s (shoulder muscles).                  Right Sternocleidomastoid - 20 units of Botox at 1 site/s.               Left Sternocleidomastoid - 10 units of Botox at 1 site/s.       RESPONSE TO PROCEDURE  Kendra Vaca tolerated the procedure well and there were no immediate complications. She was allowed to recover for an appropriate period of time and was discharged home in stable condition.    ASSESSMENT AND PLAN   Botulinum toxin injections: No changes made to Botox dose or distribution today. Patient will continue to monitor response and report at next appointment.   Referrals: None.   Follow up: Kendra Vaca was rescheduled for the next series of injections in 12 weeks, at which time we will evaluate response to today's injections. she may call the clinic prior with any questions or concerns prior to the next appointment.      Virginia Rosas MD          Again, thank you for allowing me to participate in the care of your patient.        Sincerely,        Virginia Rosas MD    Electronically signed

## 2025-02-17 NOTE — PROGRESS NOTES
[facility]    PM&R CLINIC NOTE  BOTULINUM TOXIN PROCEDURE      HPI  Chief Complaint   Patient presents with    Procedure     Botox      Kendra Vaca is a 64 year old female with a history of involuntary spasms of the neck and shoulder muscles with tremor who presents to clinic for botulinum toxin injections for management of cervical dystonia.     SINCE LAST VISIT  Kendra Vaca was last seen here in clinic on 10/3/2024, at which time she received 200 units of Botox.    Patient denies new medical diagnoses, illnesses, hospitalizations, emergency room visits, and injuries since the previous injection with botulinum neurotoxin.     RESPONSE TO PREVIOUS TREATMENT    Side effects: No problems reported    Pain Improvement: Yes.  Percent Improvement: 90 %    Duration of Benefit:   11 weeks and followed by a gradual reduction in benefit    Dystonia Improvement: Yes.  Percent Improvement: 90 %    Duration of Benefit:   11 weeks and followed by a gradual reduction in benefit.       PHYSICAL EXAM  VS: /69 (BP Location: Right arm, Patient Position: Sitting, Cuff Size: Adult Regular)   Pulse (!) 130   LMP 02/15/2012    GEN: Pleasant and cooperative, in no acute distress  HEENT: No facial asymmetry  HEAD, NECK AND TRUNK PATTERN:   Continuous multidirectional tremor, 'no-no' most obvious  Right rotation  Left side bending      ALLERGIES  Allergies   Allergen Reactions    Ciprofloxacin Anaphylaxis and Other (See Comments)     Throat swelling      Morphine Sulfate Hives    Seafood Hives    Shellfish-Derived Products Anaphylaxis    Latex      CONTACT RASH WITH LATEX PRODUCTS IN THE HEAT OF SUMMER       CURRENT MEDICATIONS    Current Outpatient Medications:     acetaminophen (TYLENOL) 500 MG tablet, Take 500-1,000 mg by mouth every 6 hours as needed for mild pain (can take 4 per day), Disp: , Rfl:     atorvastatin (LIPITOR) 20 MG tablet, Take 1 tablet by mouth at bedtime, Disp: , Rfl:      azelastine (OPTIVAR) 0.05 % ophthalmic solution, Apply 1 drop to eye, Disp: , Rfl:     azelastine (OPTIVAR) 0.05 % SOLN, Place 1 drop into both eyes as needed , Disp: , Rfl:     cephALEXin (KEFLEX) 500 MG capsule, Take 500 mg by mouth once, Disp: , Rfl:     EPINEPHrine (EPIPEN JR) 0.15 MG/0.3ML injection, Inject 0.15 mg into the muscle as needed for anaphylaxis, Disp: , Rfl:     GEMTESA 75 MG TABS tablet, Take 1 tablet every day by oral route., Disp: , Rfl:     ibuprofen (ADVIL/MOTRIN) 800 MG tablet, Take 800 mg by mouth, Disp: , Rfl:     ketoconazole (NIZORAL) 2 % external cream, , Disp: , Rfl:     naproxen (NAPROSYN) 500 MG tablet, Take 1 tablet (500 mg) by mouth 2 times daily (with meals), Disp: 28 tablet, Rfl: 0    penciclovir (DENAVIR) 1 % external cream, Apply 1 g topically every 2 hours, Disp: , Rfl:     Current Facility-Administered Medications:     botulinum toxin type A (BOTOX) 100 units injection 300 Units, 300 Units, Intramuscular, Q90 Days, Virginia Rosas MD, 200 Units at 25 1454    botulinum toxin type A (BOTOX) 100 units injection 300 Units, 300 Units, Intramuscular, Q90 Days, Virginia Rosas MD, 200 Units at 24 0900       BOTULINUM NEUROTOXIN INJECTION PROCEDURES    VERIFICATION OF PATIENT IDENTIFICATION AND PROCEDURE     Initials   Patient Name SES   Patient  SES   Procedure Verified by: JASMYN     Prior to the start of the procedure and with procedural staff participation, I verbally confirmed the patient s identity using two indicators, relevant allergies, that the procedure was appropriate and matched the consent or emergent situation, and that the correct equipment/implants were available. Immediately prior to starting the procedure I conducted the Time Out with the procedural staff and re-confirmed the patient s name, procedure, and site/side. (The Joint Commission universal protocol was followed.)  Yes    Sedation (Moderate or Deep): None    ABOVE ASSESSMENTS  PERFORMED BY    Virginia Rosas MD      INDICATIONS FOR PROCEDURES  Kendra Vaca is a 64 year old patient with a history of involuntary spasms of the neck and shoulder muscles with tremor who presents to clinic for botulinum toxin injections for management of cervical dystonia.  She is here today for reinjection with Botox.    GOAL OF PROCEDURE  The goal of this procedure is to increase active range of motion, improve volitional motor control, decrease pain  and enhance functional independence.      TOTAL DOSE ADMINISTERED  Dose Administered:  200 units  Botox (Botulinum Toxin Type A)       1:1 Dilution   Unavoidable Drug Waste: No  Diluent Used:  Preservative Free Normal Saline  Total Volume of Diluent Used:  2 ml  NDC #: Botox 100u ([address]-13)      CONSENT  The risks, benefits, and treatment options were discussed with Kendra Vaca and she agreed to proceed.    Written consent was obtained by  [facility] .     EQUIPMENT USED  Needle-37mm stimulating/recording  EMG/NCS Machine    SKIN PREPARATION  Skin preparation was performed using an alcohol wipe.    GUIDANCE DESCRIPTION  Electro-myographic guidance was necessary throughout the procedure to accurately identify all areas of dystonic muscles while avoiding injection of non-dystonic muscles, neighboring nerves and nearby vascular structures.       AREA/MUSCLE INJECTED:  200 UNITS BOTOX = TOTAL DOSE, 1:1 DILUTION    1. HEAD & NECK MUSCLES: 200 UNITS BOTOX = TOTAL DOSE     Right Mid-Trapezius - 20 units of Botox at 1 site/s.   Left Mid-Trapezius - 20 units of Botox at 1 site/s.      Right lateral Trapezius - 30 units of Botox at 1 site.  Left lateral Trapezius - 10 units of Botox at 1 site.      Right Splenius Cervicis - 30 units of Botox at 1 site/s.   Left Splenius Cervicis - 20 units of Botox at 1 site/s.      Right Levator Scapulae - 20 units of Botox at 1 site/s (shoulder muscles).   Left Levator Scapulae - 20 units of Botox at 1 site/s (shoulder  muscles).                  Right Sternocleidomastoid - 20 units of Botox at 1 site/s.               Left Sternocleidomastoid - 10 units of Botox at 1 site/s.       RESPONSE TO PROCEDURE  Kendra Vaca tolerated the procedure well and there were no immediate complications. She was allowed to recover for an appropriate period of time and was discharged home in stable condition.    ASSESSMENT AND PLAN   Botulinum toxin injections: No changes made to Botox dose or distribution today. Patient will continue to monitor response and report at next appointment.   Referrals: None.   Follow up: Kendra Vaca was rescheduled for the next series of injections in 12 weeks, at which time we will evaluate response to today's injections. she may call the clinic prior with any questions or concerns prior to the next appointment.      Virginia Rosas MD

## 2025-05-01 ENCOUNTER — OFFICE VISIT (OUTPATIENT)
Dept: PHYSICAL MEDICINE AND REHAB | Facility: CLINIC | Age: 65
End: 2025-05-01
Payer: MEDICARE

## 2025-05-01 VITALS — SYSTOLIC BLOOD PRESSURE: 124 MMHG | DIASTOLIC BLOOD PRESSURE: 75 MMHG | HEART RATE: 80 BPM

## 2025-05-01 DIAGNOSIS — G24.1 GENETIC TORSION DYSTONIA: ICD-10-CM

## 2025-05-01 DIAGNOSIS — G24.3 SPASMODIC TORTICOLLIS: Primary | ICD-10-CM

## 2025-05-01 NOTE — PROGRESS NOTES
[facility]    PM&R CLINIC NOTE  BOTULINUM TOXIN PROCEDURE      HPI  Chief Complaint   Patient presents with    Botox     Kendra Vaca is a 64 year old female with a history of involuntary spasms of the neck and shoulder muscles with tremor who presents to clinic for botulinum toxin injections for management of cervical dystonia.     SINCE LAST VISIT  Kendra Vaca was last seen here in clinic on 2/6/2025, at which time she received 200 units of Botox.    Patient denies new medical diagnoses, illnesses, hospitalizations, emergency room visits, and injuries since the previous injection with botulinum neurotoxin.     RESPONSE TO PREVIOUS TREATMENT    Side effects: No problems reported    Pain Improvement: Yes.  Percent Improvement: 90 %    Duration of Benefit:   11 weeks and followed by a gradual reduction in benefit    Dystonia Improvement: Yes.  Percent Improvement: 90 %    Duration of Benefit:   11 weeks and followed by a gradual reduction in benefit.       PHYSICAL EXAM  VS: /75 (BP Location: Right arm, Patient Position: Sitting, Cuff Size: Adult Regular)   Pulse 80   LMP 02/15/2012    GEN: Pleasant and cooperative, in no acute distress  HEENT: No facial asymmetry  HEAD, NECK AND TRUNK PATTERN:   Continuous multidirectional tremor, 'no-no' most obvious  Right rotation  Left side bending      ALLERGIES  Allergies   Allergen Reactions    Ciprofloxacin Anaphylaxis and Other (See Comments)     Throat swelling      Morphine Sulfate Hives    Seafood Hives    Shellfish-Derived Products Anaphylaxis    Latex      CONTACT RASH WITH LATEX PRODUCTS IN THE HEAT OF SUMMER       CURRENT MEDICATIONS    Current Outpatient Medications:     acetaminophen (TYLENOL) 500 MG tablet, Take 500-1,000 mg by mouth every 6 hours as needed for mild pain (can take 4 per day), Disp: , Rfl:     atorvastatin (LIPITOR) 20 MG tablet, Take 1 tablet by mouth at bedtime, Disp: , Rfl:     azelastine  (OPTIVAR) 0.05 % ophthalmic solution, Apply 1 drop to eye, Disp: , Rfl:     azelastine (OPTIVAR) 0.05 % SOLN, Place 1 drop into both eyes as needed , Disp: , Rfl:     cephALEXin (KEFLEX) 500 MG capsule, Take 500 mg by mouth once, Disp: , Rfl:     EPINEPHrine (EPIPEN JR) 0.15 MG/0.3ML injection, Inject 0.15 mg into the muscle as needed for anaphylaxis, Disp: , Rfl:     GEMTESA 75 MG TABS tablet, Take 1 tablet every day by oral route., Disp: , Rfl:     ibuprofen (ADVIL/MOTRIN) 800 MG tablet, Take 800 mg by mouth, Disp: , Rfl:     ketoconazole (NIZORAL) 2 % external cream, , Disp: , Rfl:     naproxen (NAPROSYN) 500 MG tablet, Take 1 tablet (500 mg) by mouth 2 times daily (with meals), Disp: 28 tablet, Rfl: 0    penciclovir (DENAVIR) 1 % external cream, Apply 1 g topically every 2 hours, Disp: , Rfl:     Current Facility-Administered Medications:     botulinum toxin type A (BOTOX) 100 units injection 300 Units, 300 Units, Intramuscular, Q90 Days, Virginia Rosas MD, 200 Units at 25 1454       BOTULINUM NEUROTOXIN INJECTION PROCEDURES    VERIFICATION OF PATIENT IDENTIFICATION AND PROCEDURE     Initials   Patient Name SES   Patient  SES   Procedure Verified by: SES     Prior to the start of the procedure and with procedural staff participation, I verbally confirmed the patient s identity using two indicators, relevant allergies, that the procedure was appropriate and matched the consent or emergent situation, and that the correct equipment/implants were available. Immediately prior to starting the procedure I conducted the Time Out with the procedural staff and re-confirmed the patient s name, procedure, and site/side. (The Joint Commission universal protocol was followed.)  Yes    Sedation (Moderate or Deep): None    ABOVE ASSESSMENTS PERFORMED BY    Virginia Rosas MD      INDICATIONS FOR PROCEDURES  Kendra Vaca is a 64 year old patient with a history of involuntary spasms of the neck and  shoulder muscles with tremor who presents to clinic for botulinum toxin injections for management of cervical dystonia.  She is here today for reinjection with Botox.    GOAL OF PROCEDURE  The goal of this procedure is to increase active range of motion, improve volitional motor control, decrease pain  and enhance functional independence.      TOTAL DOSE ADMINISTERED  Dose Administered:  200 units  Botox (Botulinum Toxin Type A)       1:1 Dilution   Unavoidable Drug Waste: No  Diluent Used:  Preservative Free Normal Saline  Total Volume of Diluent Used:  2 ml  NDC #: Botox 100u ([address]-52)      CONSENT  The risks, benefits, and treatment options were discussed with Kendra De Leónbartolo and she agreed to proceed.    Written consent was obtained by  [facility] .     EQUIPMENT USED  Needle-37mm stimulating/recording  EMG/NCS Machine    SKIN PREPARATION  Skin preparation was performed using an alcohol wipe.    GUIDANCE DESCRIPTION  Electro-myographic guidance was necessary throughout the procedure to accurately identify all areas of dystonic muscles while avoiding injection of non-dystonic muscles, neighboring nerves and nearby vascular structures.       AREA/MUSCLE INJECTED:  200 UNITS BOTOX = TOTAL DOSE, 1:1 DILUTION    1. HEAD & NECK MUSCLES: 200 UNITS BOTOX = TOTAL DOSE     Right Mid-Trapezius - 20 units of Botox at 1 site/s.   Left Mid-Trapezius - 20 units of Botox at 1 site/s.      Right lateral Trapezius - 30 units of Botox at 1 site.  Left lateral Trapezius - 10 units of Botox at 1 site.      Right Splenius Cervicis - 30 units of Botox at 1 site/s.   Left Splenius Cervicis - 20 units of Botox at 1 site/s.      Right Levator Scapulae - 20 units of Botox at 1 site/s (shoulder muscles).   Left Levator Scapulae - 20 units of Botox at 1 site/s (shoulder muscles).                  Right Sternocleidomastoid - 20 units of Botox at 1 site/s.               Left Sternocleidomastoid - 10 units of Botox at 1 site/s.        RESPONSE TO PROCEDURE  Kendra Vaca tolerated the procedure well and there were no immediate complications. She was allowed to recover for an appropriate period of time and was discharged home in stable condition.    ASSESSMENT AND PLAN   Botulinum toxin injections: No changes made to Botox dose or distribution today. Patient will continue to monitor response and report at next appointment.   Referrals: None.  Medications: No changes.    Follow up: Kendra Vaca was rescheduled for the next series of injections in 12 weeks, at which time we will evaluate response to today's injections. she may call the clinic prior with any questions or concerns prior to the next appointment.      Virginia Rosas MD

## 2025-05-01 NOTE — LETTER
5/1/2025      Kendra Vaca  [address] [facility] [address]      Dear Colleague,    Thank you for referring your patient, Kendra Vaca, to the [facility]. Please see a copy of my visit note below.      [facility]    PM&R CLINIC NOTE  BOTULINUM TOXIN PROCEDURE      HPI  Chief Complaint   Patient presents with     Botox     Kendra Vaca is a 64 year old female with a history of involuntary spasms of the neck and shoulder muscles with tremor who presents to clinic for botulinum toxin injections for management of cervical dystonia.     SINCE LAST VISIT  Kendra Vaca was last seen here in clinic on 2/6/2025, at which time she received 200 units of Botox.    Patient denies new medical diagnoses, illnesses, hospitalizations, emergency room visits, and injuries since the previous injection with botulinum neurotoxin.     RESPONSE TO PREVIOUS TREATMENT    Side effects: No problems reported    Pain Improvement: Yes.  Percent Improvement: 90 %    Duration of Benefit:  11 weeks and followed by a gradual reduction in benefit    Dystonia Improvement: Yes.  Percent Improvement: 90 %    Duration of Benefit:  11 weeks and followed by a gradual reduction in benefit.       PHYSICAL EXAM  VS: /75 (BP Location: Right arm, Patient Position: Sitting, Cuff Size: Adult Regular)   Pulse 80   LMP 02/15/2012    GEN: Pleasant and cooperative, in no acute distress  HEENT: No facial asymmetry  HEAD, NECK AND TRUNK PATTERN:   Continuous multidirectional tremor, 'no-no' most obvious  Right rotation  Left side bending      ALLERGIES  Allergies   Allergen Reactions     Ciprofloxacin Anaphylaxis and Other (See Comments)     Throat swelling       Morphine Sulfate Hives     Seafood Hives     Shellfish-Derived Products Anaphylaxis     Latex      CONTACT RASH WITH LATEX PRODUCTS IN THE HEAT OF SUMMER       CURRENT MEDICATIONS    Current Outpatient  Medications:      acetaminophen (TYLENOL) 500 MG tablet, Take 500-1,000 mg by mouth every 6 hours as needed for mild pain (can take 4 per day), Disp: , Rfl:      atorvastatin (LIPITOR) 20 MG tablet, Take 1 tablet by mouth at bedtime, Disp: , Rfl:      azelastine (OPTIVAR) 0.05 % ophthalmic solution, Apply 1 drop to eye, Disp: , Rfl:      azelastine (OPTIVAR) 0.05 % SOLN, Place 1 drop into both eyes as needed , Disp: , Rfl:      cephALEXin (KEFLEX) 500 MG capsule, Take 500 mg by mouth once, Disp: , Rfl:      EPINEPHrine (EPIPEN JR) 0.15 MG/0.3ML injection, Inject 0.15 mg into the muscle as needed for anaphylaxis, Disp: , Rfl:      GEMTESA 75 MG TABS tablet, Take 1 tablet every day by oral route., Disp: , Rfl:      ibuprofen (ADVIL/MOTRIN) 800 MG tablet, Take 800 mg by mouth, Disp: , Rfl:      ketoconazole (NIZORAL) 2 % external cream, , Disp: , Rfl:      naproxen (NAPROSYN) 500 MG tablet, Take 1 tablet (500 mg) by mouth 2 times daily (with meals), Disp: 28 tablet, Rfl: 0     penciclovir (DENAVIR) 1 % external cream, Apply 1 g topically every 2 hours, Disp: , Rfl:     Current Facility-Administered Medications:      botulinum toxin type A (BOTOX) 100 units injection 300 Units, 300 Units, Intramuscular, Q90 Days, Standal, Virginia Kong MD, 200 Units at 25 1454       BOTULINUM NEUROTOXIN INJECTION PROCEDURES    VERIFICATION OF PATIENT IDENTIFICATION AND PROCEDURE     Initials   Patient Name SES   Patient  SES   Procedure Verified by: SES     Prior to the start of the procedure and with procedural staff participation, I verbally confirmed the patient s identity using two indicators, relevant allergies, that the procedure was appropriate and matched the consent or emergent situation, and that the correct equipment/implants were available. Immediately prior to starting the procedure I conducted the Time Out with the procedural staff and re-confirmed the patient s name, procedure, and site/side. (The Joint  Commission universal protocol was followed.)  Yes    Sedation (Moderate or Deep): None    ABOVE ASSESSMENTS PERFORMED BY    Virginia Rosas MD      INDICATIONS FOR PROCEDURES  Kendra Vaca is a 64 year old patient with a history of involuntary spasms of the neck and shoulder muscles with tremor who presents to clinic for botulinum toxin injections for management of cervical dystonia.  She is here today for reinjection with Botox.    GOAL OF PROCEDURE  The goal of this procedure is to increase active range of motion, improve volitional motor control, decrease pain  and enhance functional independence.      TOTAL DOSE ADMINISTERED  Dose Administered:  200 units  Botox (Botulinum Toxin Type A)       1:1 Dilution   Unavoidable Drug Waste: No  Diluent Used:  Preservative Free Normal Saline  Total Volume of Diluent Used:  2 ml  NDC #: Botox 100u ([address]-07)      CONSENT  The risks, benefits, and treatment options were discussed with Kendra Vaca and she agreed to proceed.    Written consent was obtained by [facility].     EQUIPMENT USED  Needle-37mm stimulating/recording  EMG/NCS Machine    SKIN PREPARATION  Skin preparation was performed using an alcohol wipe.    GUIDANCE DESCRIPTION  Electro-myographic guidance was necessary throughout the procedure to accurately identify all areas of dystonic muscles while avoiding injection of non-dystonic muscles, neighboring nerves and nearby vascular structures.       AREA/MUSCLE INJECTED:  200 UNITS BOTOX = TOTAL DOSE, 1:1 DILUTION    1. HEAD & NECK MUSCLES: 200 UNITS BOTOX = TOTAL DOSE     Right Mid-Trapezius - 20 units of Botox at 1 site/s.   Left Mid-Trapezius - 20 units of Botox at 1 site/s.      Right lateral Trapezius - 30 units of Botox at 1 site.  Left lateral Trapezius - 10 units of Botox at 1 site.      Right Splenius Cervicis - 30 units of Botox at 1 site/s.   Left Splenius Cervicis - 20 units of Botox at 1 site/s.      Right Levator Scapulae - 20 units of  Botox at 1 site/s (shoulder muscles).   Left Levator Scapulae - 20 units of Botox at 1 site/s (shoulder muscles).                  Right Sternocleidomastoid - 20 units of Botox at 1 site/s.               Left Sternocleidomastoid - 10 units of Botox at 1 site/s.       RESPONSE TO PROCEDURE  Kendra Vaca tolerated the procedure well and there were no immediate complications. She was allowed to recover for an appropriate period of time and was discharged home in stable condition.    ASSESSMENT AND PLAN   Botulinum toxin injections: No changes made to Botox dose or distribution today. Patient will continue to monitor response and report at next appointment.   Referrals: None.  Medications: No changes.    Follow up: Kendra Vaca was rescheduled for the next series of injections in 12 weeks, at which time we will evaluate response to today's injections. she may call the clinic prior with any questions or concerns prior to the next appointment.      Virginia Rosas MD        Again, thank you for allowing me to participate in the care of your patient.        Sincerely,        Virginia Rosas MD    Electronically signed

## 2025-07-05 ENCOUNTER — HEALTH MAINTENANCE LETTER (OUTPATIENT)
Age: 65
End: 2025-07-05

## 2025-07-24 ENCOUNTER — OFFICE VISIT (OUTPATIENT)
Dept: PHYSICAL MEDICINE AND REHAB | Facility: CLINIC | Age: 65
End: 2025-07-24
Payer: MEDICARE

## 2025-07-24 VITALS — HEART RATE: 83 BPM | DIASTOLIC BLOOD PRESSURE: 76 MMHG | SYSTOLIC BLOOD PRESSURE: 122 MMHG

## 2025-07-24 DIAGNOSIS — G24.3 SPASMODIC TORTICOLLIS: Primary | ICD-10-CM

## 2025-07-24 DIAGNOSIS — G24.1 GENETIC TORSION DYSTONIA: ICD-10-CM

## 2025-07-24 RX ORDER — MIRABEGRON 50 MG/1
50 TABLET, FILM COATED, EXTENDED RELEASE ORAL DAILY
COMMUNITY
Start: 2024-05-29

## 2025-07-24 NOTE — LETTER
7/24/2025      Kendra Vaca  [address] [facility] [id number]      Dear Colleague,    Thank you for referring your patient, Kendra Vaca, to the [facility]. Please see a copy of my visit note below.      [facility]    PM&R CLINIC NOTE  BOTULINUM TOXIN PROCEDURE      HPI  No chief complaint on file.    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     SINCE LAST VISIT  Kendra Vaca was last seen here in clinic on 5/1/2025, at which time she received 200 units of Botox.    She was seen by her PCP on 5/21 with tooth and head pain with recommendation to follow up with dentist. She was evaluated in  on 6/28 for tick bite. Tick was fully removed and she was started on doxycycline.     Patient otherwise denies new medical diagnoses, illnesses, hospitalizations, emergency room visits, and injuries since the previous injection with botulinum neurotoxin.     RESPONSE TO PREVIOUS TREATMENT    Side effects: No problems reported    Pain Improvement: Yes. Percent Improvement: 90%    Duration of Benefit:  11 weeks and followed by a gradual reduction in benefit    Dystonia Improvement: Yes. Percent Improvement: 90 %    Duration of Benefit:  11 weeks and followed by a gradual reduction in benefit.     PATIENT SPECIFIC FUNCTIONAL SCALE (PSFS):  Activity: Drinking and eating  Score: 10/10 with Botox, 0/10 when Botox wears off  Activity: Able to maintain balance when walking  Score: 10/10 with Botox, 0/10 when Botox wears off (has had falls in the past)  (0=unable to perform, 10=able to perform without difficulty)      PHYSICAL EXAM  VS: [facility] 02/15/2012    GEN: Pleasant and cooperative, in no acute distress  HEENT: No facial asymmetry  HEAD, NECK AND TRUNK PATTERN:   Continuous  multidirectional tremor, 'no-no' most obvious  Right rotation  Left side bending      ALLERGIES  Allergies   Allergen Reactions     Ciprofloxacin Anaphylaxis and Other (See Comments)     Throat swelling       Morphine Sulfate Hives     Seafood Hives     Shellfish-Derived Products Anaphylaxis     Latex      CONTACT RASH WITH LATEX PRODUCTS IN THE HEAT OF SUMMER       CURRENT MEDICATIONS    Current Outpatient Medications:      acetaminophen (TYLENOL) 500 MG tablet, Take 500-1,000 mg by mouth every 6 hours as needed for mild pain (can take 4 per day), Disp: , Rfl:      atorvastatin (LIPITOR) 20 MG tablet, Take 1 tablet by mouth at bedtime, Disp: , Rfl:      azelastine (OPTIVAR) 0.05 % ophthalmic solution, Apply 1 drop to eye, Disp: , Rfl:      azelastine (OPTIVAR) 0.05 % SOLN, Place 1 drop into both eyes as needed , Disp: , Rfl:      cephALEXin (KEFLEX) 500 MG capsule, Take 500 mg by mouth once, Disp: , Rfl:      EPINEPHrine (EPIPEN JR) 0.15 MG/0.3ML injection, Inject 0.15 mg into the muscle as needed for anaphylaxis, Disp: , Rfl:      GEMTESA 75 MG TABS tablet, Take 1 tablet every day by oral route., Disp: , Rfl:      ibuprofen (ADVIL/MOTRIN) 800 MG tablet, Take 800 mg by mouth, Disp: , Rfl:      ketoconazole (NIZORAL) 2 % external cream, , Disp: , Rfl:      naproxen (NAPROSYN) 500 MG tablet, Take 1 tablet (500 mg) by mouth 2 times daily (with meals), Disp: 28 tablet, Rfl: 0     penciclovir (DENAVIR) 1 % external cream, Apply 1 g topically every 2 hours, Disp: , Rfl:     Current Facility-Administered Medications:      botulinum toxin type A (BOTOX) 100 units injection 300 Units, 300 Units, Intramuscular, Q90 Days, Standal, Virginia Kong MD, 200 Units at 25 1138       BOTULINUM NEUROTOXIN INJECTION PROCEDURES    VERIFICATION OF PATIENT IDENTIFICATION AND PROCEDURE     Initials   Patient Name SES   Patient  SES   Procedure Verified by: SES     Prior to the start of the procedure and with procedural staff  participation, I verbally confirmed the patient s identity using two indicators, relevant allergies, that the procedure was appropriate and matched the consent or emergent situation, and that the correct equipment/implants were available. Immediately prior to starting the procedure I conducted the Time Out with the procedural staff and re-confirmed the patient s name, procedure, and site/side. 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  She is here today for reinjection with Botox.    GOAL OF PROCEDURE  The goal of this procedure is to increase active range of motion, improve volitional motor control, decrease pain  and enhance functional independence.      TOTAL DOSE ADMINISTERED  Dose Administered:  200 units  Botox (Botulinum Toxin Type A)       1:1 Dilution   Unavoidable Drug Waste: No  Diluent Used:  Preservative Free Normal Saline  Total Volume of Diluent Used:  2 ml  NDC #: Botox 100u ([address]-71)      CONSENT  The risks, benefits, and treatment options were discussed with Kendra Vaca and she agreed to proceed.    Written consent was obtained by [facility].     EQUIPMENT USED  Needle-37mm stimulating/recording  EMG/NCS Machine    SKIN PREPARATION  Skin preparation was performed using an alcohol wipe.    GUIDANCE DESCRIPTION  Electro-myographic guidance was necessary throughout the procedure to accurately identify all areas of dystonic muscles while avoiding injection of non-dystonic muscles, neighboring nerves and nearby vascular structures.       AREA/MUSCLE INJECTED:  200 UNITS BOTOX = TOTAL DOSE, 1:1 DILUTION    1. HEAD & NECK MUSCLES: 200  UNITS BOTOX = TOTAL DOSE     Right Mid-Trapezius - 20 units of Botox at 1 site/s.   Left Mid-Trapezius - 20 units of Botox at 1 site/s.      Right lateral Trapezius - 30 units of Botox at 1 site.  Left lateral Trapezius - 10 units of Botox at 1 site.      Right Splenius Cervicis - 30 units of Botox at 1 site/s.   Left Splenius Cervicis - 20 units of Botox at 1 site/s.      Right Levator Scapulae - 20 units of Botox at 1 site/s (shoulder muscles).   Left Levator Scapulae - 20 units of Botox at 1 site/s (shoulder muscles).                  Right Sternocleidomastoid - 20 units of Botox at 1 site/s.               Left Sternocleidomastoid - 10 units of Botox at 1 site/s.       RESPONSE TO PROCEDURE  Kendra Vaca tolerated the procedure well and there were no immediate complications. She was allowed to recover for an appropriate period of time and was discharged home in stable condition.    ASSESSMENT AND PLAN   Botulinum toxin injections: No changes were made to the Botox dose or injection pattern today. We discussed Daxxify as a potential alternative that may offer longer-lasting symptom relief; however, the patient is not interested in switching at this time but remains open to learning more at future visits. She will continue to monitor her response and follow up at the next appointment.  Referrals: None.  Medications: No changes.    Follow up: Kendra Vaca was rescheduled for the next series of injections in 12 weeks, at which time we will evaluate response to today's injections. she may call the clinic prior with any questions or concerns prior to the next appointment.      Loren Del Rio, MS3 [facility]  July 24, 2025     Physician Attestation  I, Virginia Rosas MD, was present with the medical student who participated in the service and in the documentation of the note.  I have verified the history and personally performed the physical exam and medical decision making.  I agree with the assessment and  plan of care as documented in the note.  I performed the entire injection procedure above.       Virginia Rosas MD        Again, thank you for allowing me to participate in the care of your patient.        Sincerely,        Virginia Rosas MD    Electronically signed

## 2025-07-24 NOTE — PROGRESS NOTES
[facility]    PM&R CLINIC NOTE  BOTULINUM TOXIN PROCEDURE      HPI  No chief complaint on file.    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     SINCE LAST VISIT  Kendra Vaca was last seen here in clinic on 5/1/2025, at which time she received 200 units of Botox.    She was seen by her PCP on 5/21 with tooth and head pain with recommendation to follow up with dentist. She was evaluated in  on 6/28 for tick bite. Tick was fully removed and she was started on doxycycline.     Patient otherwise denies new medical diagnoses, illnesses, hospitalizations, emergency room visits, and injuries since the previous injection with botulinum neurotoxin.     RESPONSE TO PREVIOUS TREATMENT    Side effects: No problems reported    Pain Improvement: Yes. Percent Improvement: 90%    Duration of Benefit:  11 weeks and followed by a gradual reduction in benefit    Dystonia Improvement: Yes. Percent Improvement: 90 %    Duration of Benefit:  11 weeks and followed by a gradual reduction in benefit.     PATIENT SPECIFIC FUNCTIONAL SCALE (PSFS):  Activity: Drinking and eating  Score: 10/10 with Botox, 0/10 when Botox wears off  Activity: Able to maintain balance when walking  Score: 10/10 with Botox, 0/10 when Botox wears off (has had falls in the past)  (0=unable to perform, 10=able to perform without difficulty)      PHYSICAL EXAM  VS: [facility] 02/15/2012    GEN: Pleasant and cooperative, in no acute distress  HEENT: No facial asymmetry  HEAD, NECK AND TRUNK PATTERN:   Continuous multidirectional tremor, 'no-no' most obvious  Right rotation  Left side bending      ALLERGIES  Allergies   Allergen Reactions    Ciprofloxacin Anaphylaxis and Other (See Comments)     Throat swelling      Morphine Sulfate Hives    Seafood Hives     Shellfish-Derived Products Anaphylaxis    Latex      CONTACT RASH WITH LATEX PRODUCTS IN THE HEAT OF SUMMER       CURRENT MEDICATIONS    Current Outpatient Medications:     acetaminophen (TYLENOL) 500 MG tablet, Take 500-1,000 mg by mouth every 6 hours as needed for mild pain (can take 4 per day), Disp: , Rfl:     atorvastatin (LIPITOR) 20 MG tablet, Take 1 tablet by mouth at bedtime, Disp: , Rfl:     azelastine (OPTIVAR) 0.05 % ophthalmic solution, Apply 1 drop to eye, Disp: , Rfl:     azelastine (OPTIVAR) 0.05 % SOLN, Place 1 drop into both eyes as needed , Disp: , Rfl:     cephALEXin (KEFLEX) 500 MG capsule, Take 500 mg by mouth once, Disp: , Rfl:     EPINEPHrine (EPIPEN JR) 0.15 MG/0.3ML injection, Inject 0.15 mg into the muscle as needed for anaphylaxis, Disp: , Rfl:     GEMTESA 75 MG TABS tablet, Take 1 tablet every day by oral route., Disp: , Rfl:     ibuprofen (ADVIL/MOTRIN) 800 MG tablet, Take 800 mg by mouth, Disp: , Rfl:     ketoconazole (NIZORAL) 2 % external cream, , Disp: , Rfl:     naproxen (NAPROSYN) 500 MG tablet, Take 1 tablet (500 mg) by mouth 2 times daily (with meals), Disp: 28 tablet, Rfl: 0    penciclovir (DENAVIR) 1 % external cream, Apply 1 g topically every 2 hours, Disp: , Rfl:     Current Facility-Administered Medications:     botulinum toxin type A (BOTOX) 100 units injection 300 Units, 300 Units, Intramuscular, Q90 Days, Standal, Virginia Kong MD, 200 Units at 25 1138       BOTULINUM NEUROTOXIN INJECTION PROCEDURES    VERIFICATION OF PATIENT IDENTIFICATION AND PROCEDURE     Initials   Patient Name SES   Patient  SES   Procedure Verified by: SES     Prior to the start of the procedure and with procedural staff participation, I verbally confirmed the patient s identity using two indicators, relevant allergies, that the procedure was appropriate and matched the consent or emergent situation, and that the correct equipment/implants were available. Immediately prior to starting  the procedure I conducted the Time Out with the procedural staff and re-confirmed the patient s name, procedure, and site/side. 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  She is here today for reinjection with Botox.    GOAL OF PROCEDURE  The goal of this procedure is to increase active range of motion, improve volitional motor control, decrease pain  and enhance functional independence.      TOTAL DOSE ADMINISTERED  Dose Administered:  200 units  Botox (Botulinum Toxin Type A)       1:1 Dilution   Unavoidable Drug Waste: No  Diluent Used:  Preservative Free Normal Saline  Total Volume of Diluent Used:  2 ml  NDC #: Botox 100u ([id number])      CONSENT  The risks, benefits, and treatment options were discussed with Kendra Vaca and she agreed to proceed.    Written consent was obtained by [facility].     EQUIPMENT USED  Needle-37mm stimulating/recording  EMG/NCS Machine    SKIN PREPARATION  Skin preparation was performed using an alcohol wipe.    GUIDANCE DESCRIPTION  Electro-myographic guidance was necessary throughout the procedure to accurately identify all areas of dystonic muscles while avoiding injection of non-dystonic muscles, neighboring nerves and nearby vascular structures.       AREA/MUSCLE INJECTED:  200 UNITS BOTOX = TOTAL DOSE, 1:1 DILUTION    1. HEAD & NECK MUSCLES: 200 UNITS BOTOX = TOTAL DOSE     Right Mid-Trapezius - 20 units of Botox at 1 site/s.   Left Mid-Trapezius - 20 units of Botox at 1 site/s.      Right lateral Trapezius - 30 units of Botox at 1 site.  Left lateral Trapezius - 10 units of Botox at 1 site.      Right Splenius  Cervicis - 30 units of Botox at 1 site/s.   Left Splenius Cervicis - 20 units of Botox at 1 site/s.      Right Levator Scapulae - 20 units of Botox at 1 site/s (shoulder muscles).   Left Levator Scapulae - 20 units of Botox at 1 site/s (shoulder muscles).                  Right Sternocleidomastoid - 20 units of Botox at 1 site/s.               Left Sternocleidomastoid - 10 units of Botox at 1 site/s.       RESPONSE TO PROCEDURE  Kendra Vaca tolerated the procedure well and there were no immediate complications. She was allowed to recover for an appropriate period of time and was discharged home in stable condition.    ASSESSMENT AND PLAN   Botulinum toxin injections: No changes were made to the Botox dose or injection pattern today. We discussed Daxxify as a potential alternative that may offer longer-lasting symptom relief; however, the patient is not interested in switching at this time but remains open to learning more at future visits. She will continue to monitor her response and follow up at the next appointment.  Referrals: None.  Medications: No changes.    Follow up: Kendra Vaca was rescheduled for the next series of injections in 12 weeks, at which time we will evaluate response to today's injections. she may call the clinic prior with any questions or concerns prior to the next appointment.      Loren Quang, MS3 [facility]  July 24, 2025     Physician Attestation   I, Virginia Rosas MD, was present with the medical student who participated in the service and in the documentation of the note.  I have verified the history and personally performed the physical exam and medical decision making.  I agree with the assessment and plan of care as documented in the note.  I performed the entire injection procedure above.       Virginia Rosas MD

## 2025-08-08 ENCOUNTER — APPOINTMENT (OUTPATIENT)
Dept: CT IMAGING | Facility: CLINIC | Age: 65
End: 2025-08-08
Attending: EMERGENCY MEDICINE
Payer: MEDICARE

## 2025-08-08 ENCOUNTER — HOSPITAL ENCOUNTER (EMERGENCY)
Facility: CLINIC | Age: 65
Discharge: HOME | End: 2025-08-08
Attending: EMERGENCY MEDICINE | Admitting: EMERGENCY MEDICINE
Payer: MEDICARE

## 2025-08-08 VITALS
SYSTOLIC BLOOD PRESSURE: 127 MMHG | OXYGEN SATURATION: 94 % | TEMPERATURE: 98.6 F | DIASTOLIC BLOOD PRESSURE: 78 MMHG | HEIGHT: 65 IN | RESPIRATION RATE: 18 BRPM | HEART RATE: 78 BPM | WEIGHT: 168.43 LBS | BODY MASS INDEX: 28.06 KG/M2

## 2025-08-08 DIAGNOSIS — R09.1 PLEURISY: Primary | ICD-10-CM

## 2025-08-08 LAB
ANION GAP SERPL CALCULATED.3IONS-SCNC: 12 MMOL/L (ref 7–15)
APTT PPP: 31 SECONDS (ref 22–38)
ATRIAL RATE - MUSE: 78 BPM
BASOPHILS # BLD AUTO: 0 10E3/UL (ref 0–0.2)
BASOPHILS NFR BLD AUTO: 1 %
BUN SERPL-MCNC: 10.5 MG/DL (ref 8–23)
CALCIUM SERPL-MCNC: 8.9 MG/DL (ref 8.8–10.4)
CHLORIDE SERPL-SCNC: 102 MMOL/L (ref 98–107)
CREAT BLD-MCNC: 0.9 MG/DL (ref 0.5–1)
CREAT SERPL-MCNC: 0.84 MG/DL (ref 0.51–0.95)
DIASTOLIC BLOOD PRESSURE - MUSE: NORMAL MMHG
EGFRCR SERPLBLD CKD-EPI 2021: 77 ML/MIN/1.73M2
EGFRCR SERPLBLD CKD-EPI 2021: >60 ML/MIN/1.73M2
EOSINOPHIL # BLD AUTO: 0.1 10E3/UL (ref 0–0.7)
EOSINOPHIL NFR BLD AUTO: 2 %
ERYTHROCYTE [DISTWIDTH] IN BLOOD BY AUTOMATED COUNT: 14.1 % (ref 10–15)
GLUCOSE SERPL-MCNC: 108 MG/DL (ref 70–99)
HCO3 SERPL-SCNC: 25 MMOL/L (ref 22–29)
HCT VFR BLD AUTO: 43 % (ref 35–47)
HGB BLD-MCNC: 13.1 G/DL (ref 11.7–15.7)
IMM GRANULOCYTES # BLD: 0 10E3/UL
IMM GRANULOCYTES NFR BLD: 0 %
INR PPP: 0.85 (ref 0.85–1.15)
INTERPRETATION ECG - MUSE: NORMAL
LYMPHOCYTES # BLD AUTO: 0.9 10E3/UL (ref 0.8–5.3)
LYMPHOCYTES NFR BLD AUTO: 23 %
MCH RBC QN AUTO: 26.3 PG (ref 26.5–33)
MCHC RBC AUTO-ENTMCNC: 30.5 G/DL (ref 31.5–36.5)
MCV RBC AUTO: 86 FL (ref 78–100)
MONOCYTES # BLD AUTO: 0.5 10E3/UL (ref 0–1.3)
MONOCYTES NFR BLD AUTO: 12 %
NEUTROPHILS # BLD AUTO: 2.6 10E3/UL (ref 1.6–8.3)
NEUTROPHILS NFR BLD AUTO: 62 %
NRBC # BLD AUTO: 0 10E3/UL
NRBC BLD AUTO-RTO: 0 /100
P AXIS - MUSE: 73 DEGREES
PLATELET # BLD AUTO: 216 10E3/UL (ref 150–450)
POTASSIUM SERPL-SCNC: 4 MMOL/L (ref 3.4–5.3)
PR INTERVAL - MUSE: 160 MS
PROTHROMBIN TIME: 11.7 SECONDS (ref 11.8–14.8)
QRS DURATION - MUSE: 82 MS
QT - MUSE: 402 MS
QTC - MUSE: 458 MS
R AXIS - MUSE: 1 DEGREES
RBC # BLD AUTO: 4.98 10E6/UL (ref 3.8–5.2)
SODIUM SERPL-SCNC: 139 MMOL/L (ref 135–145)
SYSTOLIC BLOOD PRESSURE - MUSE: NORMAL MMHG
T AXIS - MUSE: 90 DEGREES
TROPONIN T SERPL HS-MCNC: 23 NG/L
TROPONIN T SERPL HS-MCNC: 24 NG/L
VENTRICULAR RATE- MUSE: 78 BPM
WBC # BLD AUTO: 4.2 10E3/UL (ref 4–11)

## 2025-08-08 PROCEDURE — 85610 PROTHROMBIN TIME: CPT | Mod: GZ | Performed by: EMERGENCY MEDICINE

## 2025-08-08 PROCEDURE — 74177 CT ABD & PELVIS W/CONTRAST: CPT

## 2025-08-08 PROCEDURE — 85730 THROMBOPLASTIN TIME PARTIAL: CPT | Mod: GZ | Performed by: EMERGENCY MEDICINE

## 2025-08-08 PROCEDURE — 82565 ASSAY OF CREATININE: CPT

## 2025-08-08 PROCEDURE — 93005 ELECTROCARDIOGRAM TRACING: CPT | Performed by: EMERGENCY MEDICINE

## 2025-08-08 PROCEDURE — 250N000009 HC RX 250: Performed by: EMERGENCY MEDICINE

## 2025-08-08 PROCEDURE — 250N000011 HC RX IP 250 OP 636: Performed by: EMERGENCY MEDICINE

## 2025-08-08 PROCEDURE — 96375 TX/PRO/DX INJ NEW DRUG ADDON: CPT | Mod: 59 | Performed by: EMERGENCY MEDICINE

## 2025-08-08 PROCEDURE — 96374 THER/PROPH/DIAG INJ IV PUSH: CPT | Mod: 59 | Performed by: EMERGENCY MEDICINE

## 2025-08-08 PROCEDURE — 85041 AUTOMATED RBC COUNT: CPT | Performed by: EMERGENCY MEDICINE

## 2025-08-08 PROCEDURE — 99285 EMERGENCY DEPT VISIT HI MDM: CPT | Mod: 25 | Performed by: EMERGENCY MEDICINE

## 2025-08-08 PROCEDURE — 84484 ASSAY OF TROPONIN QUANT: CPT | Performed by: EMERGENCY MEDICINE

## 2025-08-08 PROCEDURE — 36415 COLL VENOUS BLD VENIPUNCTURE: CPT | Performed by: EMERGENCY MEDICINE

## 2025-08-08 PROCEDURE — 84132 ASSAY OF SERUM POTASSIUM: CPT | Performed by: EMERGENCY MEDICINE

## 2025-08-08 RX ORDER — PREDNISONE 20 MG/1
TABLET ORAL
Qty: 10 TABLET | Refills: 0 | Status: SHIPPED | OUTPATIENT
Start: 2025-08-08

## 2025-08-08 RX ORDER — KETOROLAC TROMETHAMINE 15 MG/ML
15 INJECTION, SOLUTION INTRAMUSCULAR; INTRAVENOUS ONCE
Status: COMPLETED | OUTPATIENT
Start: 2025-08-08 | End: 2025-08-08

## 2025-08-08 RX ORDER — HYDROMORPHONE HYDROCHLORIDE 1 MG/ML
0.5 INJECTION, SOLUTION INTRAMUSCULAR; INTRAVENOUS; SUBCUTANEOUS
Refills: 0 | Status: COMPLETED | OUTPATIENT
Start: 2025-08-08 | End: 2025-08-08

## 2025-08-08 RX ORDER — IOPAMIDOL 755 MG/ML
500 INJECTION, SOLUTION INTRAVASCULAR ONCE
Status: COMPLETED | OUTPATIENT
Start: 2025-08-08 | End: 2025-08-08

## 2025-08-08 RX ADMIN — KETOROLAC TROMETHAMINE 15 MG: 15 INJECTION, SOLUTION INTRAMUSCULAR; INTRAVENOUS at 13:34

## 2025-08-08 RX ADMIN — HYDROMORPHONE HYDROCHLORIDE 0.5 MG: 1 INJECTION, SOLUTION INTRAMUSCULAR; INTRAVENOUS; SUBCUTANEOUS at 11:29

## 2025-08-08 RX ADMIN — SODIUM CHLORIDE 60 ML: 9 INJECTION, SOLUTION INTRAVENOUS at 12:38

## 2025-08-08 RX ADMIN — LIDOCAINE HYDROCHLORIDE 0.2 ML: 10 INJECTION, SOLUTION EPIDURAL; INFILTRATION; INTRACAUDAL; PERINEURAL at 11:33

## 2025-08-08 RX ADMIN — IOPAMIDOL 72 ML: 755 INJECTION, SOLUTION INTRAVENOUS at 12:38

## 2025-08-08 ASSESSMENT — ACTIVITIES OF DAILY LIVING (ADL)
ADLS_ACUITY_SCORE: 41

## 2025-08-08 ASSESSMENT — COLUMBIA-SUICIDE SEVERITY RATING SCALE - C-SSRS
2. HAVE YOU ACTUALLY HAD ANY THOUGHTS OF KILLING YOURSELF IN THE PAST MONTH?: NO
6. HAVE YOU EVER DONE ANYTHING, STARTED TO DO ANYTHING, OR PREPARED TO DO ANYTHING TO END YOUR LIFE?: NO
1. IN THE PAST MONTH, HAVE YOU WISHED YOU WERE DEAD OR WISHED YOU COULD GO TO SLEEP AND NOT WAKE UP?: NO

## (undated) RX ORDER — LIDOCAINE 40 MG/G
CREAM TOPICAL
Status: DISPENSED
Start: 2018-06-13

## (undated) RX ORDER — LIDOCAINE HYDROCHLORIDE 10 MG/ML
INJECTION, SOLUTION EPIDURAL; INFILTRATION; INTRACAUDAL; PERINEURAL
Status: DISPENSED
Start: 2017-06-07

## (undated) RX ORDER — LIDOCAINE 40 MG/G
CREAM TOPICAL
Status: DISPENSED
Start: 2017-06-07

## (undated) RX ORDER — LIDOCAINE 40 MG/G
CREAM TOPICAL
Status: DISPENSED
Start: 2018-08-30

## (undated) RX ORDER — LIDOCAINE 40 MG/G
CREAM TOPICAL
Status: DISPENSED
Start: 2019-05-22

## (undated) RX ORDER — LIDOCAINE 40 MG/G
CREAM TOPICAL
Status: DISPENSED
Start: 2019-02-25

## (undated) RX ORDER — LIDOCAINE 40 MG/G
CREAM TOPICAL
Status: DISPENSED
Start: 2017-03-22

## (undated) RX ORDER — LIDOCAINE 40 MG/G
CREAM TOPICAL
Status: DISPENSED
Start: 2017-08-24

## (undated) RX ORDER — LIDOCAINE HYDROCHLORIDE AND EPINEPHRINE 10; 10 MG/ML; UG/ML
INJECTION, SOLUTION INFILTRATION; PERINEURAL
Status: DISPENSED
Start: 2022-12-11

## (undated) RX ORDER — LIDOCAINE 40 MG/G
CREAM TOPICAL
Status: DISPENSED
Start: 2017-10-31